# Patient Record
Sex: FEMALE | URBAN - METROPOLITAN AREA
[De-identification: names, ages, dates, MRNs, and addresses within clinical notes are randomized per-mention and may not be internally consistent; named-entity substitution may affect disease eponyms.]

---

## 2023-03-20 ENCOUNTER — TELEPHONE (OUTPATIENT)
Dept: MAMMOGRAPHY | Facility: HOSPITAL | Age: 53
End: 2023-03-20

## 2023-03-20 NOTE — TELEPHONE ENCOUNTER
Outside breast imaging has been received and reviewed by Dr Catherine Garcia. Left breast repeat ultrasound is recommended here prior to possible left breast 1 site ultrasound guided biopsy. This Breast Care RN phoned pt re left breast 1 site ultrasound guided biopsy recommendation by outside images for mass. Procedure reviewed and all questions answered. Emotional support given. Reviewed educational handouts. On the day of the biopsy, pt instructed to take Tylenol 1000mg PO, eat a light meal & bring or wear a sports bra. Post biopsy care also reviewed with pt to include NO lifting more than 5lbs, no exercising or housework (limit upper body movement) for 24-48 hrs post biopsy. Patient denies blood thinners, bleeding disorders, liver disease, and chemo. Pt verbalized understanding. Our breast center schedulers will be calling to schedule an appt that is convenient for pt.

## 2023-03-24 ENCOUNTER — HOSPITAL ENCOUNTER (OUTPATIENT)
Dept: MAMMOGRAPHY | Facility: HOSPITAL | Age: 53
Discharge: HOME | End: 2023-03-24
Attending: INTERNAL MEDICINE
Payer: COMMERCIAL

## 2023-03-24 DIAGNOSIS — N63.0 BREAST MASS: ICD-10-CM

## 2023-03-24 DIAGNOSIS — R92.8 ABNORMAL MAMMOGRAM: ICD-10-CM

## 2023-03-24 PROCEDURE — 77065 DX MAMMO INCL CAD UNI: CPT | Performed by: INTERNAL MEDICINE

## 2023-03-24 PROCEDURE — 88360 TUMOR IMMUNOHISTOCHEM/MANUAL: CPT | Performed by: INTERNAL MEDICINE

## 2023-03-24 PROCEDURE — 19083 BX BREAST 1ST LESION US IMAG: CPT | Performed by: INTERNAL MEDICINE

## 2023-03-24 PROCEDURE — 76642 ULTRASOUND BREAST LIMITED: CPT | Performed by: INTERNAL MEDICINE

## 2023-03-24 PROCEDURE — 88305 TISSUE EXAM BY PATHOLOGIST: CPT | Performed by: INTERNAL MEDICINE

## 2023-03-24 PROCEDURE — 88344 IMHCHEM/IMCYTCHM EA MLT ANTB: CPT | Performed by: INTERNAL MEDICINE

## 2023-03-28 ENCOUNTER — APPOINTMENT (OUTPATIENT)
Dept: GENETICS | Facility: HOSPITAL | Age: 53
End: 2023-03-28
Attending: INTERNAL MEDICINE
Payer: COMMERCIAL

## 2023-03-28 ENCOUNTER — NURSE NAVIGATOR ENCOUNTER (OUTPATIENT)
Dept: HEMATOLOGY/ONCOLOGY | Facility: HOSPITAL | Age: 53
End: 2023-03-28

## 2023-03-28 ENCOUNTER — TELEPHONE (OUTPATIENT)
Dept: GENERAL RADIOLOGY | Facility: HOSPITAL | Age: 53
End: 2023-03-28

## 2023-03-28 NOTE — PROGRESS NOTES
Received phone call from patient's  looking to schedule with Dr. Ham Session for a new diagnosis of breast cancer. Scheduled at the earliest appointment, 4/11. Also scheduled with medical oncology for 4/4 with Dr. Rosi Sanchez and genetics on 4/11, as her biopsy results indicate a triple negative breast cancer. Reviewed over pathology report and discussed the type of breast cancer, grade, and ER/VA and Her 2. Briefly discussed breast cancer treatments including surgery, radiation, hormone therapy, and chemotherapy. Explained the breast cancer multidisciplinary meeting and that her case will be discussed. Patient given my contact information to call with any further questions or concerns.

## 2023-04-04 ENCOUNTER — NURSE NAVIGATOR ENCOUNTER (OUTPATIENT)
Dept: HEMATOLOGY/ONCOLOGY | Facility: HOSPITAL | Age: 53
End: 2023-04-04

## 2023-04-04 ENCOUNTER — OFFICE VISIT (OUTPATIENT)
Dept: HEMATOLOGY/ONCOLOGY | Facility: HOSPITAL | Age: 53
End: 2023-04-04
Attending: INTERNAL MEDICINE
Payer: COMMERCIAL

## 2023-04-04 VITALS
BODY MASS INDEX: 28.51 KG/M2 | OXYGEN SATURATION: 100 % | HEART RATE: 88 BPM | SYSTOLIC BLOOD PRESSURE: 150 MMHG | TEMPERATURE: 98 F | DIASTOLIC BLOOD PRESSURE: 81 MMHG | HEIGHT: 67 IN | WEIGHT: 181.63 LBS | RESPIRATION RATE: 18 BRPM

## 2023-04-04 DIAGNOSIS — C50.812 MALIGNANT NEOPLASM OF OVERLAPPING SITES OF LEFT BREAST IN FEMALE, ESTROGEN RECEPTOR NEGATIVE (HCC): Primary | ICD-10-CM

## 2023-04-04 DIAGNOSIS — Z17.1 MALIGNANT NEOPLASM OF OVERLAPPING SITES OF LEFT BREAST IN FEMALE, ESTROGEN RECEPTOR NEGATIVE (HCC): Primary | ICD-10-CM

## 2023-04-04 PROCEDURE — 99245 OFF/OP CONSLTJ NEW/EST HI 55: CPT | Performed by: INTERNAL MEDICINE

## 2023-04-04 RX ORDER — ATENOLOL 50 MG/1
50 TABLET ORAL DAILY
COMMUNITY
Start: 2023-01-02

## 2023-04-04 RX ORDER — PNV NO.95/FERROUS FUM/FOLIC AC 28MG-0.8MG
TABLET ORAL DAILY
COMMUNITY

## 2023-04-04 RX ORDER — ATORVASTATIN CALCIUM 20 MG/1
20 TABLET, FILM COATED ORAL DAILY
COMMUNITY
Start: 2022-10-14

## 2023-04-04 NOTE — PROGRESS NOTES
Called patient for screening questions for MRI, scheduled for Thursday, 4/6. Patient aware of the appointment, date/time/location. Will call back with any other questions.

## 2023-04-04 NOTE — PROGRESS NOTES
Patient is here for MD consult for Breast cancer. Patient had a mammogram in November @ [address]. [place]. Upon her return, she had a repeat mammogram, ultrasound and biopsy. She is scheduled to see surgeon and genetics next week.        Education Record    Learner:  Patient and Spouse    Disease / Diagnosis:  Consult     Barriers / Limitations:  None   Comments:    Method:  Discussion   Comments:    General Topics:  Plan of care reviewed   Comments:    Outcome:  Shows understanding   Comments:

## 2023-04-06 ENCOUNTER — HOSPITAL ENCOUNTER (OUTPATIENT)
Dept: MRI IMAGING | Facility: HOSPITAL | Age: 53
Discharge: HOME | End: 2023-04-06
Attending: INTERNAL MEDICINE
Payer: COMMERCIAL

## 2023-04-06 DIAGNOSIS — C50.812 MALIGNANT NEOPLASM OF OVERLAPPING SITES OF LEFT BREAST IN FEMALE, ESTROGEN RECEPTOR NEGATIVE (HCC): ICD-10-CM

## 2023-04-06 DIAGNOSIS — Z17.1 MALIGNANT NEOPLASM OF OVERLAPPING SITES OF LEFT BREAST IN FEMALE, ESTROGEN RECEPTOR NEGATIVE (HCC): ICD-10-CM

## 2023-04-06 PROCEDURE — 77049 MRI BREAST C-+ W/CAD BI: CPT | Performed by: INTERNAL MEDICINE

## 2023-04-06 PROCEDURE — A9575 INJ GADOTERATE MEGLUMI 0.1ML: HCPCS | Performed by: INTERNAL MEDICINE

## 2023-04-06 RX ORDER — GADOTERATE MEGLUMINE 376.9 MG/ML
20 INJECTION INTRAVENOUS
Status: COMPLETED | OUTPATIENT
Start: 2023-04-06 | End: 2023-04-06

## 2023-04-06 RX ADMIN — GADOTERATE MEGLUMINE 16 ML: 376.9 INJECTION INTRAVENOUS at 09:27:00

## 2023-04-07 ENCOUNTER — TELEPHONE (OUTPATIENT)
Dept: GENERAL RADIOLOGY | Facility: HOSPITAL | Age: 53
End: 2023-04-07

## 2023-04-07 ENCOUNTER — NURSE NAVIGATOR ENCOUNTER (OUTPATIENT)
Dept: HEMATOLOGY/ONCOLOGY | Facility: HOSPITAL | Age: 53
End: 2023-04-07

## 2023-04-07 DIAGNOSIS — C50.812 MALIGNANT NEOPLASM OF OVERLAPPING SITES OF LEFT BREAST IN FEMALE, ESTROGEN RECEPTOR NEGATIVE (HCC): Primary | ICD-10-CM

## 2023-04-07 DIAGNOSIS — Z17.1 MALIGNANT NEOPLASM OF OVERLAPPING SITES OF LEFT BREAST IN FEMALE, ESTROGEN RECEPTOR NEGATIVE (HCC): Primary | ICD-10-CM

## 2023-04-07 NOTE — PROGRESS NOTES
Spoke with patient about results of MRI and that Dr. Toussaint Mention is recommending an additional MRI biopsy of the right breast.  Discussed the importance of this and surgical planning. Patient agreeable. Entered order and let patient know that a  will call. Patient aware of appointment on Tuesday with Dr. Toussaint Mention. Will call back as needed.

## 2023-04-10 ENCOUNTER — TELEPHONE (OUTPATIENT)
Dept: MAMMOGRAPHY | Facility: HOSPITAL | Age: 53
End: 2023-04-10

## 2023-04-10 NOTE — TELEPHONE ENCOUNTER
This Breast Care RN assisted Dr. Diogo Sheets with recommendation for a right breast MRI biopsy. Procedure reviewed and all questions answered. Emotional and educational support given. On the day of the biopsy, pt instructed to take Tylenol 1000mg PO, eat a light meal & bring or wear a sports bra. Post biopsy care also reviewed with pt to include NO lifting more than 5lbs, no exercising or housework (limit upper body movement) for 24-48 hrs post biopsy. Pt denies allergies, blood thinners, bleeding disorders, liver disease, and chemo. Pt verbalized understanding. Our breast center schedulers will be calling to schedule an appt that is convenient for pt.

## 2023-04-11 ENCOUNTER — OFFICE VISIT (OUTPATIENT)
Dept: HEMATOLOGY/ONCOLOGY | Facility: HOSPITAL | Age: 53
End: 2023-04-11
Attending: INTERNAL MEDICINE
Payer: COMMERCIAL

## 2023-04-11 ENCOUNTER — GENETICS ENCOUNTER (OUTPATIENT)
Dept: GENETICS | Facility: HOSPITAL | Age: 53
End: 2023-04-11
Attending: INTERNAL MEDICINE
Payer: COMMERCIAL

## 2023-04-11 ENCOUNTER — NURSE NAVIGATOR ENCOUNTER (OUTPATIENT)
Dept: HEMATOLOGY/ONCOLOGY | Facility: HOSPITAL | Age: 53
End: 2023-04-11

## 2023-04-11 ENCOUNTER — OFFICE VISIT (OUTPATIENT)
Facility: LOCATION | Age: 53
End: 2023-04-11
Payer: COMMERCIAL

## 2023-04-11 VITALS
SYSTOLIC BLOOD PRESSURE: 138 MMHG | RESPIRATION RATE: 16 BRPM | TEMPERATURE: 97 F | OXYGEN SATURATION: 100 % | HEART RATE: 79 BPM | WEIGHT: 180 LBS | HEIGHT: 67.01 IN | BODY MASS INDEX: 28.25 KG/M2 | DIASTOLIC BLOOD PRESSURE: 88 MMHG

## 2023-04-11 DIAGNOSIS — C50.912 DUCTAL CARCINOMA IN SITU (DCIS) OF LEFT BREAST WITH MICROINVASIVE COMPONENT (HCC): Primary | ICD-10-CM

## 2023-04-11 DIAGNOSIS — C50.912 DUCTAL CARCINOMA IN SITU (DCIS) OF LEFT BREAST WITH MICROINVASIVE COMPONENT (HCC): ICD-10-CM

## 2023-04-11 PROCEDURE — 99244 OFF/OP CNSLTJ NEW/EST MOD 40: CPT | Performed by: SURGERY

## 2023-04-11 PROCEDURE — 99211 OFF/OP EST MAY X REQ PHY/QHP: CPT

## 2023-04-11 PROCEDURE — 96040 HC GENETIC COUNSELING EA 30 MIN: CPT | Performed by: GENETIC COUNSELOR, MS

## 2023-04-11 PROCEDURE — 36415 COLL VENOUS BLD VENIPUNCTURE: CPT

## 2023-04-11 NOTE — PROGRESS NOTES
Met with patient and  in clinic. Introduced myself as one the breast nurse navigators and explained the role of the breast nurse navigator and coordination of care. Explained the role of all of the physicians involved in her care including the surgeon, medical oncologist, radiation oncologist, and possibly plastic surgeon. Reviewed over the patients pathology report and discussed receptors including ER/IL/ and Her 2. Patient was given the breast cancer treatment handbook and reviewed over how to use this resource. Discussed the breast multidisciplinary conference and that her case will be discussed. Patient was given the contact information for the social workers at the [facility] and resources for support as requested. Breast cancer journey map provided to patient. Provided lumpectomy surgical sheet. Next step in care will be to have MRI biopsy on Friday and return on 4/25 for a surgical planning discussion. Pt was provided with breast nurse navigator contact information and was encouraged to phone with any other questions or concerns.

## 2023-04-11 NOTE — PROGRESS NOTES
Patient presents to clinic for breast consultation and assessment. Ultrasound-guided core biopsy of left breast performed on 3/24/23. 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 Patient and spouse here to discuss pathology results and future surgical intervention. Biopsy site . Denies any fever, chills, redness, or swelling. No bleeding or discharge at biopsy site. Appointment with genetic counselor following today's appointment. Medication and allergies reviewed and updated.

## 2023-04-14 ENCOUNTER — HOSPITAL ENCOUNTER (OUTPATIENT)
Dept: MAMMOGRAPHY | Facility: HOSPITAL | Age: 53
Discharge: HOME | End: 2023-04-14
Attending: SURGERY
Payer: COMMERCIAL

## 2023-04-14 ENCOUNTER — HOSPITAL ENCOUNTER (OUTPATIENT)
Dept: MRI IMAGING | Facility: HOSPITAL | Age: 53
Discharge: HOME | End: 2023-04-14
Attending: SURGERY
Payer: COMMERCIAL

## 2023-04-14 DIAGNOSIS — Z17.1 MALIGNANT NEOPLASM OF OVERLAPPING SITES OF LEFT BREAST IN FEMALE, ESTROGEN RECEPTOR NEGATIVE (HCC): ICD-10-CM

## 2023-04-14 DIAGNOSIS — C50.812 MALIGNANT NEOPLASM OF OVERLAPPING SITES OF LEFT BREAST IN FEMALE, ESTROGEN RECEPTOR NEGATIVE (HCC): ICD-10-CM

## 2023-04-14 PROCEDURE — 77065 DX MAMMO INCL CAD UNI: CPT | Performed by: SURGERY

## 2023-04-14 PROCEDURE — A9575 INJ GADOTERATE MEGLUMI 0.1ML: HCPCS | Performed by: SURGERY

## 2023-04-14 PROCEDURE — 88305 TISSUE EXAM BY PATHOLOGIST: CPT | Performed by: SURGERY

## 2023-04-14 PROCEDURE — 19085 BX BREAST 1ST LESION MR IMAG: CPT | Performed by: SURGERY

## 2023-04-14 RX ORDER — GADOTERATE MEGLUMINE 376.9 MG/ML
20 INJECTION INTRAVENOUS
Status: COMPLETED | OUTPATIENT
Start: 2023-04-14 | End: 2023-04-14

## 2023-04-14 RX ADMIN — GADOTERATE MEGLUMINE 18 ML: 376.9 INJECTION INTRAVENOUS at 08:00:00

## 2023-04-14 NOTE — IMAGING NOTE
Verified patient name,  and allergies. Procedure explained throughout and all questions answered. Consent and discharge paperwork signed by Elodia Fontanez. Pt placed prone in comfortable position on MRI table. Right breast positioned in compression. Time out performed. Assisted Dr. Garfield Strong  with MRI guided biopsy. Patient tolerated well. Pressure held on biopsy site for 10 min. Nobleeding noted with no hematoma. Patient transported to [facility] via [address] for post Bx mammogram. Report to [place].

## 2023-04-14 NOTE — IMAGING NOTE
Patient arrived in stable condition to [address] post MRI guided breast biopsy. Steri strips intact. Juice provided. Post biopsy mammogram images completed. Ice pack provided to patient. Patient has no further questions regarding discharge instructions. Patient discharged to home in stable condition.

## 2023-04-17 ENCOUNTER — TELEPHONE (OUTPATIENT)
Facility: LOCATION | Age: 53
End: 2023-04-17

## 2023-04-17 NOTE — TELEPHONE ENCOUNTER
Contacted patient via telephone regarding results of right breast biopsy. 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 Patient will keep appointment scheduled for next week for surgical planning.

## 2023-04-21 ENCOUNTER — GENETICS ENCOUNTER (OUTPATIENT)
Dept: HEMATOLOGY/ONCOLOGY | Facility: HOSPITAL | Age: 53
End: 2023-04-21

## 2023-04-21 NOTE — PROGRESS NOTES
Referring Provider:                    Anival Du MD    Reason for Referral:  Korina York had genetic testing performed on 04/11/23 because of a new diagnosis of triple negative breast cancer. Genetic Testing Result:  No known pathogenic variants were found in the following 19 genes: CAR, BARD1, BRCA1, BRCA2, BRIP1, CDH1, CHEK2, MLH1, MSH2 (including EPCAM rearrangement testing), MSH6, NF1, PALB2, PMS2, PTEN, RAD51C, RAD51D, STK11, and TP53. Please refer to the report from [facility] for additional testing information. These results were discussed with Ms. Rodriguez by phone on 04/20/23. Summary and Plan:  These results indicate that it is unlikely that Ms. Tonie Morton has a pathogenic variant in any of the genes listed above. The limitations of the testing include the chance that a pathogenic variant in a gene other than those included in this analysis might be the cause of cancer in Ms. Rodriguez or her relatives. I encouraged Ms. Rodriguez to contact me on an annual basis to see if there have been any updates in genetic testing that would apply to her or to inform me if there are any changes to the family history. In the meantime, Ms. Rodriguez and her relatives should speak with their physicians to discuss recommended medical management according to their personal and family history.     Cc:  Lonaivon York

## 2023-04-25 ENCOUNTER — OFFICE VISIT (OUTPATIENT)
Facility: LOCATION | Age: 53
End: 2023-04-25
Payer: COMMERCIAL

## 2023-04-25 ENCOUNTER — OFFICE VISIT (OUTPATIENT)
Dept: HEMATOLOGY/ONCOLOGY | Facility: HOSPITAL | Age: 53
End: 2023-04-25
Attending: INTERNAL MEDICINE
Payer: COMMERCIAL

## 2023-04-25 VITALS
BODY MASS INDEX: 28.17 KG/M2 | WEIGHT: 179.5 LBS | DIASTOLIC BLOOD PRESSURE: 81 MMHG | SYSTOLIC BLOOD PRESSURE: 138 MMHG | RESPIRATION RATE: 14 BRPM | HEIGHT: 67.01 IN | TEMPERATURE: 97 F | OXYGEN SATURATION: 100 % | HEART RATE: 80 BPM

## 2023-04-25 DIAGNOSIS — C50.912 DUCTAL CARCINOMA IN SITU (DCIS) OF LEFT BREAST WITH MICROINVASIVE COMPONENT (HCC): Primary | ICD-10-CM

## 2023-04-25 PROCEDURE — 99211 OFF/OP EST MAY X REQ PHY/QHP: CPT

## 2023-04-25 PROCEDURE — 99214 OFFICE O/P EST MOD 30 MIN: CPT | Performed by: SURGERY

## 2023-04-26 ENCOUNTER — TELEPHONE (OUTPATIENT)
Facility: LOCATION | Age: 53
End: 2023-04-26

## 2023-04-26 DIAGNOSIS — C50.912 MALIGNANT NEOPLASM OF LEFT FEMALE BREAST, UNSPECIFIED ESTROGEN RECEPTOR STATUS, UNSPECIFIED SITE OF BREAST (HCC): Primary | ICD-10-CM

## 2023-04-26 NOTE — TELEPHONE ENCOUNTER
----- Message from Hector Sandy MD sent at 4/25/2023  9:03 AM CDT -----  Can you please schedule her for x-ray localized left breast central lumpectomy, left sentinel lymph node biopsy, injection blue dye left side? We were looking at May 11. Me with assist  General, outpatient    Dx: Left breast cancer    Thanks! Mary Ann    5/11 NOT AVAILABLE , 5/5 DATE FOR SURGERY AND PATIENT INFORMED.

## 2023-04-27 ENCOUNTER — EKG ENCOUNTER (OUTPATIENT)
Dept: LAB | Age: 53
End: 2023-04-27
Payer: COMMERCIAL

## 2023-04-27 ENCOUNTER — LABORATORY ENCOUNTER (OUTPATIENT)
Dept: LAB | Age: 53
End: 2023-04-27
Payer: COMMERCIAL

## 2023-04-27 DIAGNOSIS — C50.912 MALIGNANT NEOPLASM OF LEFT FEMALE BREAST, UNSPECIFIED ESTROGEN RECEPTOR STATUS, UNSPECIFIED SITE OF BREAST (HCC): ICD-10-CM

## 2023-04-27 LAB
ANION GAP SERPL CALC-SCNC: 3 MMOL/L (ref 0–18)
BUN BLD-MCNC: 7 MG/DL (ref 7–18)
CALCIUM BLD-MCNC: 9.7 MG/DL (ref 8.5–10.1)
CHLORIDE SERPL-SCNC: 104 MMOL/L (ref 98–112)
CO2 SERPL-SCNC: 28 MMOL/L (ref 21–32)
CREAT BLD-MCNC: 0.74 MG/DL
FASTING STATUS PATIENT QL REPORTED: YES
GFR SERPLBLD BASED ON 1.73 SQ M-ARVRAT: 97 ML/MIN/1.73M2 (ref 60–?)
GLUCOSE BLD-MCNC: 146 MG/DL (ref 70–99)
OSMOLALITY SERPL CALC.SUM OF ELEC: 281 MOSM/KG (ref 275–295)
POTASSIUM SERPL-SCNC: 3.4 MMOL/L (ref 3.5–5.1)
SODIUM SERPL-SCNC: 135 MMOL/L (ref 136–145)

## 2023-04-27 PROCEDURE — 80048 BASIC METABOLIC PNL TOTAL CA: CPT

## 2023-04-27 PROCEDURE — 93010 ELECTROCARDIOGRAM REPORT: CPT | Performed by: INTERNAL MEDICINE

## 2023-04-27 PROCEDURE — 93005 ELECTROCARDIOGRAM TRACING: CPT

## 2023-04-27 PROCEDURE — 36415 COLL VENOUS BLD VENIPUNCTURE: CPT

## 2023-04-28 ENCOUNTER — TELEPHONE (OUTPATIENT)
Dept: GENERAL RADIOLOGY | Facility: HOSPITAL | Age: 53
End: 2023-04-28

## 2023-04-28 LAB
ATRIAL RATE: 78 BPM
P AXIS: 69 DEGREES
P-R INTERVAL: 138 MS
Q-T INTERVAL: 398 MS
QRS DURATION: 82 MS
QTC CALCULATION (BEZET): 453 MS
R AXIS: 82 DEGREES
T AXIS: 58 DEGREES
VENTRICULAR RATE: 78 BPM

## 2023-05-02 ENCOUNTER — TELEPHONE (OUTPATIENT)
Dept: MAMMOGRAPHY | Facility: HOSPITAL | Age: 53
End: 2023-05-02

## 2023-05-02 NOTE — TELEPHONE ENCOUNTER
Spoke with Joe Wagner [facility] regarding [place] Lymph Node mapping to be done in nuclear medicine department before lumpectomy surgery scheduled for 5-5-23 with Dr Ray Courser. Also reviewed wire localization to be done in [facility]. Both procedures explained and all questions answered. Pt to be transported to each department by [facility] through [place]. Will make every effort to ensure privacy and safety when transported between departments. Breast Care Coordinator to provide education and written information regarding lumpectomy surgery, breast cancer and lymphedema. Pt verbalized understanding and had no further questions at this time.

## 2023-05-03 ENCOUNTER — TELEPHONE (OUTPATIENT)
Facility: LOCATION | Age: 53
End: 2023-05-03

## 2023-05-05 ENCOUNTER — HOSPITAL ENCOUNTER (OUTPATIENT)
Dept: MAMMOGRAPHY | Facility: HOSPITAL | Age: 53
Discharge: HOME | End: 2023-05-05
Attending: SURGERY
Payer: COMMERCIAL

## 2023-05-05 ENCOUNTER — HOSPITAL ENCOUNTER (OUTPATIENT)
Facility: HOSPITAL | Age: 53
Setting detail: HOSPITAL OUTPATIENT SURGERY
Discharge: HOME | End: 2023-05-05
Attending: SURGERY | Admitting: SURGERY
Payer: COMMERCIAL

## 2023-05-05 ENCOUNTER — ANESTHESIA (OUTPATIENT)
Dept: SURGERY | Facility: HOSPITAL | Age: 53
End: 2023-05-05
Payer: COMMERCIAL

## 2023-05-05 ENCOUNTER — HOSPITAL ENCOUNTER (OUTPATIENT)
Dept: NUCLEAR MEDICINE | Facility: HOSPITAL | Age: 53
Discharge: HOME | End: 2023-05-05
Attending: SURGERY
Payer: COMMERCIAL

## 2023-05-05 ENCOUNTER — ANESTHESIA EVENT (OUTPATIENT)
Dept: SURGERY | Facility: HOSPITAL | Age: 53
End: 2023-05-05
Payer: COMMERCIAL

## 2023-05-05 VITALS
DIASTOLIC BLOOD PRESSURE: 87 MMHG | HEIGHT: 67 IN | TEMPERATURE: 97 F | SYSTOLIC BLOOD PRESSURE: 134 MMHG | RESPIRATION RATE: 14 BRPM | HEART RATE: 60 BPM | BODY MASS INDEX: 27.62 KG/M2 | OXYGEN SATURATION: 100 % | WEIGHT: 176 LBS

## 2023-05-05 DIAGNOSIS — C50.912 MALIGNANT NEOPLASM OF LEFT FEMALE BREAST, UNSPECIFIED ESTROGEN RECEPTOR STATUS, UNSPECIFIED SITE OF BREAST (HCC): ICD-10-CM

## 2023-05-05 DIAGNOSIS — C50.912 MALIGNANT NEOPLASM OF LEFT FEMALE BREAST, UNSPECIFIED ESTROGEN RECEPTOR STATUS, UNSPECIFIED SITE OF BREAST (HCC): Primary | ICD-10-CM

## 2023-05-05 LAB — B-HCG UR QL: NEGATIVE

## 2023-05-05 PROCEDURE — 88341 IMHCHEM/IMCYTCHM EA ADD ANTB: CPT | Performed by: SURGERY

## 2023-05-05 PROCEDURE — 0HBU0ZX EXCISION OF LEFT BREAST, OPEN APPROACH, DIAGNOSTIC: ICD-10-PCS | Performed by: SURGERY

## 2023-05-05 PROCEDURE — 07B60ZX EXCISION OF LEFT AXILLARY LYMPHATIC, OPEN APPROACH, DIAGNOSTIC: ICD-10-PCS | Performed by: SURGERY

## 2023-05-05 PROCEDURE — 88305 TISSUE EXAM BY PATHOLOGIST: CPT | Performed by: SURGERY

## 2023-05-05 PROCEDURE — 88377 M/PHMTRC ALYS ISHQUANT/SEMIQ: CPT | Performed by: SURGERY

## 2023-05-05 PROCEDURE — 88344 IMHCHEM/IMCYTCHM EA MLT ANTB: CPT | Performed by: SURGERY

## 2023-05-05 PROCEDURE — 88342 IMHCHEM/IMCYTCHM 1ST ANTB: CPT | Performed by: SURGERY

## 2023-05-05 PROCEDURE — 81025 URINE PREGNANCY TEST: CPT

## 2023-05-05 PROCEDURE — 88307 TISSUE EXAM BY PATHOLOGIST: CPT | Performed by: SURGERY

## 2023-05-05 PROCEDURE — 78195 LYMPH SYSTEM IMAGING: CPT | Performed by: SURGERY

## 2023-05-05 PROCEDURE — 19281 PERQ DEVICE BREAST 1ST IMAG: CPT | Performed by: SURGERY

## 2023-05-05 PROCEDURE — 88360 TUMOR IMMUNOHISTOCHEM/MANUAL: CPT | Performed by: SURGERY

## 2023-05-05 PROCEDURE — 76098 X-RAY EXAM SURGICAL SPECIMEN: CPT | Performed by: SURGERY

## 2023-05-05 RX ORDER — MIDAZOLAM HYDROCHLORIDE 1 MG/ML
1 INJECTION INTRAMUSCULAR; INTRAVENOUS EVERY 5 MIN PRN
Status: DISCONTINUED | OUTPATIENT
Start: 2023-05-05 | End: 2023-05-05

## 2023-05-05 RX ORDER — BUPIVACAINE HYDROCHLORIDE AND EPINEPHRINE 5; 5 MG/ML; UG/ML
INJECTION, SOLUTION EPIDURAL; INTRACAUDAL; PERINEURAL AS NEEDED
Status: DISCONTINUED | OUTPATIENT
Start: 2023-05-05 | End: 2023-05-05 | Stop reason: HOSPADM

## 2023-05-05 RX ORDER — DIAZEPAM 5 MG/1
5 TABLET ORAL AS NEEDED
Status: DISCONTINUED | OUTPATIENT
Start: 2023-05-05 | End: 2023-05-05 | Stop reason: HOSPADM

## 2023-05-05 RX ORDER — SCOLOPAMINE TRANSDERMAL SYSTEM 1 MG/1
1 PATCH, EXTENDED RELEASE TRANSDERMAL ONCE
Status: DISCONTINUED | OUTPATIENT
Start: 2023-05-05 | End: 2023-05-05

## 2023-05-05 RX ORDER — SODIUM CHLORIDE, SODIUM LACTATE, POTASSIUM CHLORIDE, CALCIUM CHLORIDE 600; 310; 30; 20 MG/100ML; MG/100ML; MG/100ML; MG/100ML
INJECTION, SOLUTION INTRAVENOUS CONTINUOUS
Status: DISCONTINUED | OUTPATIENT
Start: 2023-05-05 | End: 2023-05-05

## 2023-05-05 RX ORDER — HYDROCODONE BITARTRATE AND ACETAMINOPHEN 5; 325 MG/1; MG/1
1 TABLET ORAL ONCE AS NEEDED
Status: COMPLETED | OUTPATIENT
Start: 2023-05-05 | End: 2023-05-05

## 2023-05-05 RX ORDER — HYDROMORPHONE HYDROCHLORIDE 1 MG/ML
0.4 INJECTION, SOLUTION INTRAMUSCULAR; INTRAVENOUS; SUBCUTANEOUS EVERY 5 MIN PRN
Status: DISCONTINUED | OUTPATIENT
Start: 2023-05-05 | End: 2023-05-05

## 2023-05-05 RX ORDER — DEXAMETHASONE SODIUM PHOSPHATE 4 MG/ML
VIAL (ML) INJECTION AS NEEDED
Status: DISCONTINUED | OUTPATIENT
Start: 2023-05-05 | End: 2023-05-05 | Stop reason: SURG

## 2023-05-05 RX ORDER — NALOXONE HYDROCHLORIDE 0.4 MG/ML
80 INJECTION, SOLUTION INTRAMUSCULAR; INTRAVENOUS; SUBCUTANEOUS AS NEEDED
Status: DISCONTINUED | OUTPATIENT
Start: 2023-05-05 | End: 2023-05-05

## 2023-05-05 RX ORDER — PROCHLORPERAZINE EDISYLATE 5 MG/ML
5 INJECTION INTRAMUSCULAR; INTRAVENOUS EVERY 8 HOURS PRN
Status: DISCONTINUED | OUTPATIENT
Start: 2023-05-05 | End: 2023-05-05

## 2023-05-05 RX ORDER — HYDROMORPHONE HYDROCHLORIDE 1 MG/ML
0.2 INJECTION, SOLUTION INTRAMUSCULAR; INTRAVENOUS; SUBCUTANEOUS EVERY 5 MIN PRN
Status: DISCONTINUED | OUTPATIENT
Start: 2023-05-05 | End: 2023-05-05

## 2023-05-05 RX ORDER — MIDAZOLAM HYDROCHLORIDE 1 MG/ML
INJECTION INTRAMUSCULAR; INTRAVENOUS AS NEEDED
Status: DISCONTINUED | OUTPATIENT
Start: 2023-05-05 | End: 2023-05-05 | Stop reason: SURG

## 2023-05-05 RX ORDER — CEFAZOLIN SODIUM/WATER 2 G/20 ML
2 SYRINGE (ML) INTRAVENOUS ONCE
Status: COMPLETED | OUTPATIENT
Start: 2023-05-05 | End: 2023-05-05

## 2023-05-05 RX ORDER — LIDOCAINE AND PRILOCAINE 25; 25 MG/G; MG/G
CREAM TOPICAL ONCE
Status: COMPLETED | OUTPATIENT
Start: 2023-05-05 | End: 2023-05-05

## 2023-05-05 RX ORDER — HYDROMORPHONE HYDROCHLORIDE 1 MG/ML
0.6 INJECTION, SOLUTION INTRAMUSCULAR; INTRAVENOUS; SUBCUTANEOUS EVERY 5 MIN PRN
Status: DISCONTINUED | OUTPATIENT
Start: 2023-05-05 | End: 2023-05-05

## 2023-05-05 RX ORDER — METOPROLOL TARTRATE 5 MG/5ML
2.5 INJECTION INTRAVENOUS ONCE
Status: DISCONTINUED | OUTPATIENT
Start: 2023-05-05 | End: 2023-05-05

## 2023-05-05 RX ORDER — ONDANSETRON 2 MG/ML
INJECTION INTRAMUSCULAR; INTRAVENOUS AS NEEDED
Status: DISCONTINUED | OUTPATIENT
Start: 2023-05-05 | End: 2023-05-05 | Stop reason: SURG

## 2023-05-05 RX ORDER — METOCLOPRAMIDE HYDROCHLORIDE 5 MG/ML
INJECTION INTRAMUSCULAR; INTRAVENOUS AS NEEDED
Status: DISCONTINUED | OUTPATIENT
Start: 2023-05-05 | End: 2023-05-05 | Stop reason: SURG

## 2023-05-05 RX ORDER — ONDANSETRON 2 MG/ML
4 INJECTION INTRAMUSCULAR; INTRAVENOUS EVERY 6 HOURS PRN
Status: DISCONTINUED | OUTPATIENT
Start: 2023-05-05 | End: 2023-05-05

## 2023-05-05 RX ORDER — HYDROCODONE BITARTRATE AND ACETAMINOPHEN 5; 325 MG/1; MG/1
1 TABLET ORAL EVERY 6 HOURS PRN
Qty: 20 TABLET | Refills: 0 | Status: SHIPPED | OUTPATIENT
Start: 2023-05-05

## 2023-05-05 RX ORDER — KETOROLAC TROMETHAMINE 30 MG/ML
INJECTION, SOLUTION INTRAMUSCULAR; INTRAVENOUS AS NEEDED
Status: DISCONTINUED | OUTPATIENT
Start: 2023-05-05 | End: 2023-05-05 | Stop reason: SURG

## 2023-05-05 RX ORDER — ACETAMINOPHEN 500 MG
1000 TABLET ORAL ONCE AS NEEDED
Status: COMPLETED | OUTPATIENT
Start: 2023-05-05 | End: 2023-05-05

## 2023-05-05 RX ORDER — HYDROCODONE BITARTRATE AND ACETAMINOPHEN 5; 325 MG/1; MG/1
2 TABLET ORAL ONCE AS NEEDED
Status: COMPLETED | OUTPATIENT
Start: 2023-05-05 | End: 2023-05-05

## 2023-05-05 RX ORDER — MEPERIDINE HYDROCHLORIDE 25 MG/ML
12.5 INJECTION INTRAMUSCULAR; INTRAVENOUS; SUBCUTANEOUS AS NEEDED
Status: DISCONTINUED | OUTPATIENT
Start: 2023-05-05 | End: 2023-05-05

## 2023-05-05 RX ORDER — ACETAMINOPHEN 500 MG
1000 TABLET ORAL ONCE
Status: DISCONTINUED | OUTPATIENT
Start: 2023-05-05 | End: 2023-05-05 | Stop reason: HOSPADM

## 2023-05-05 RX ADMIN — SODIUM CHLORIDE, SODIUM LACTATE, POTASSIUM CHLORIDE, CALCIUM CHLORIDE: 600; 310; 30; 20 INJECTION, SOLUTION INTRAVENOUS at 13:29:00

## 2023-05-05 RX ADMIN — SODIUM CHLORIDE, SODIUM LACTATE, POTASSIUM CHLORIDE, CALCIUM CHLORIDE: 600; 310; 30; 20 INJECTION, SOLUTION INTRAVENOUS at 11:51:00

## 2023-05-05 RX ADMIN — DEXAMETHASONE SODIUM PHOSPHATE 8 MG: 4 MG/ML VIAL (ML) INJECTION at 12:08:00

## 2023-05-05 RX ADMIN — METOCLOPRAMIDE HYDROCHLORIDE 10 MG: 5 INJECTION INTRAMUSCULAR; INTRAVENOUS at 12:08:00

## 2023-05-05 RX ADMIN — KETOROLAC TROMETHAMINE 30 MG: 30 INJECTION, SOLUTION INTRAMUSCULAR; INTRAVENOUS at 13:05:00

## 2023-05-05 RX ADMIN — ONDANSETRON 4 MG: 2 INJECTION INTRAMUSCULAR; INTRAVENOUS at 13:05:00

## 2023-05-05 RX ADMIN — MIDAZOLAM HYDROCHLORIDE 2 MG: 1 INJECTION INTRAMUSCULAR; INTRAVENOUS at 11:58:00

## 2023-05-05 RX ADMIN — CEFAZOLIN SODIUM/WATER 2 G: 2 G/20 ML SYRINGE (ML) INTRAVENOUS at 12:08:00

## 2023-05-05 NOTE — DISCHARGE INSTRUCTIONS
Home Care Instructions for Breast Surgery  Dr. Shruti Zaldivar  For post-operative pain control the mediations are usually over the counter preparations such as Advil and Tylenol. For severe pain the patient may take the prescribed Norco or Tylenol #3, which are narcotic pain medications. The patient may also overlap Advil with Tylenol, Tylenol #3 or Norco.  The patient can do this by taking two Tylenol, then three hours later taking two Advil, then three hours later taking Tylenol again. All home medications may be resumed as scheduled. Generally, aspirin is avoided for ten days. DIET  The patient may resume a general diet immediately. There should be no alcohol consumption in the immediate recover time period. If the patient was sedated for the procedure the first meal should be light. WOUND CARE  The top dressings may be removed the day after surgery. This includes the gauze, tape and band-aids if they are present. Do not remove the steri-strips or butterfly tapes that are white and adherent to the skin. The steri-strips will eventually peel up at the ends and at this point they may be removed. This is usually seven to ten days after surgery. The patient may shower the day after surgery. There is no need to cover the incisions and all top gauze type dressings should be removed prior to showering. Soap can get on the wounds but do not scrub over the wounds. No hair dye or chemicals of any kind should get in the wounds. Most wounds will be closed with dissolving suture underneath the skin. These sutures will dissolve on their own. ACTIVITY  The patient may ride in a car but should not drive the car for at least overnight if sedation was used. If no sedation was used the patient may drive immediately. The patient may return to work the next day. Avoid any activity that could lead to the breast getting elbowed or bumped.   Avoid bending, pushing, pulling and lifting anything heavier than 25 pounds for two weeks. No jogging or workouts for two weeks. Fast walking and using a treadmill at less than 3.5 miles per hour in the flat position is acceptable. Patients should seek further activity limits at the time of their appointment. Patients should wear a supportive bra, even at night, for two weeks. The best support is with a sports bra. No golfing, tennis, racquetball, volleyball, or extreme sports for two weeks. APPOINTMENT  Tori Herrera or Vidal Damon will call you to set up an appointment in 5-7 days after surgery. This is when the pathology results should be available. If the wound turns red, hot, swollen, becomes increasingly painful, or drains pus call us immediately at [phone]. Bleeding requiring a return to the operating room happens two to three percent of the time. Minor bleeding from the incision is expected. A significant bruise can also be expected. Severe swelling of the breast with pain, bluish discoloration, or the passage of blood clots through the incision is an indication for bleeding. The number listed above is our office number. Our phone automatically switches to our answering service if we are not there. For non-emergent care please call our office at 8:30 a.m. Monday through Friday. For emergencies please call us at any time. Thank you for entrusting us with your care.   [facility]--General Surgery       You received a drug called Toradol which is an Anti Inflammatory at: 1PM  If you are allowed to take Anti inflammatories (like Motrin, Aleve or Ibuprofen, Advil), do not take any  until after: 7PM  Please report any suspected allergic reactions or bleeding issues to your doctor     You have been given a prescription for Joe Kettle was given to you at: 2:45PM  Next dose no sooner than:  6:45PM  Take this medication as directed  This medication contains Tylenol (acetaminophen)  Do not take additional Tylenol while taking Renetta Raj is a Narcotic and can be constipating or upset your stomach  Don't take Norco on an empty stomach  Drink plenty of water  Alcoholic beverages should be avoided while taking narcotics

## 2023-05-05 NOTE — IMAGING NOTE
Assisted  with mammography guided needle localization of the left breast.   Eduard Hendricks identified with spelling of name and date of birth. Medications and allergies reviewed. NKDA reported. History: left breast--ductal carcinoma in situ with multiple foci of microinvasion  Surgery: LEFT BREAST CENTRAL LUMPECTOMY WITH X-RAY LOCALIZATION, SENTINEL LYMPH NODE BIOPSY, INJECTION BLUE DYE    Order verified. Procedure explained and questions answered. Lexie Rodriguez verbalized understanding and agreement. 1010: Written consent obtained. 1024: Scans taken by Wilian Metcalf- mammography technologist    [phone]: Site prepped in a sterile manner. 1027: Dr. Jhonatan Carbajal present    [phone]: Time out complete. 1030 Lidocaine administered for anesthetic affect. 1031: Woodward 20G x 5cm needle placed- left breast  1032 Lidocaine administered for anesthetic affect. 1033: Woodward 20G x 5cm needle placed- left breast    Emotional support provided. Fabienne Rodriguez tolerated procedure well. Site cleaned. Wires secured with sterile 4x4 gauze dressing, Transpore tape, and a styrofoam cup. Eduard Hendricks transported via wheelchair to pre-op/surgery holding in stable condition. Ms. Franklyn Reyes without complaints at this time.

## 2023-05-05 NOTE — INTERVAL H&P NOTE
Pre-op Diagnosis: Malignant neoplasm of left female breast, unspecified estrogen receptor status, unspecified site of breast ([facility] [[email or web address]]ca [facility].) [C50.912]    The above referenced H&P was reviewed by Cecilia Head MD on 5/5/2023, the patient was examined and no significant changes have occurred in the patient's condition since the H&P was performed. I discussed with the patient and/or legal representative the potential benefits, risks and side effects of this procedure; the likelihood of the patient achieving goals; and potential problems that might occur during recuperation. I discussed reasonable alternatives to the procedure, including risks, benefits and side effects related to the alternatives and risks related to not receiving this procedure. We will proceed with procedure as planned.

## 2023-05-05 NOTE — ANESTHESIA PROCEDURE NOTES
Airway  Date/Time: 5/5/2023 11:59 AM  Urgency: elective      General Information and Staff    Patient location during procedure: OR  Anesthesiologist: Alondra Lorenz MD  Performed: anesthesiologist   Performed by: Alondra Lorenz MD  Authorized by: Alondra Lorenz MD      Indications and Patient Condition  Indications for airway management: anesthesia  Sedation level: deep  Preoxygenated: yes  Patient position: sniffing  Mask difficulty assessment: 1 - vent by mask    Final Airway Details  Final airway type: supraglottic airway      Successful airway: classic  Size 3       Number of attempts at approach: 1

## 2023-05-05 NOTE — OPERATIVE REPORT
[facility]  Op Note    Padmini Kumar [facility] Location: OR   CSN [id number] MRN [id number]   Admission Date 5/5/2023 Operation Date 5/5/2023   Attending Physician Acacia Pierce MD Operating Physician Shalom Garrett MD     DATE OF OPERATION:  5/5/2023  PREOPERATIVE DIAGNOSIS: Left breast DCIS with microinvasion  POSTOPERATIVE DIAGNOSIS: Left breast DCIS with microinvasion   PROCEDURE PERFORMED: X-ray localized left breast lumpectomy with attention to surgical margins, injection of blue dye (left breast), left sentinel lymph node biopsy. ANESTHESIA: Gen   SURGEON:  Shy Henderson M.D.  ASSISTANT: Ryann White PA-C   SPECIMEN:  1. [place] lymph node #1  2. [place] lymph node #2  3. Left breast central lumpectomy  4. Deep margin, left breast  5. Medial margin, left breast  6. Lateral margin, left breast  7. Superior margin, left breast  8. Inferior margin, left breast  ESTIMATED BLOOD LOSS: 30 mL. COMPLICATIONS: None. INDICATIONS: The patient is a 51-year-old female who was found to have extensive DCIS with microinvasion that extended up to the posterior nipple areolar complex. She was offered x-ray localized lumpectomy and sentinel lymph node biopsy. The risks, benefits, alternatives were discussed in detail with the patient. Risks included but not limited to, seroma development, infection and bleeding as well as neurovascular injury under the arm, future lymphedema, and the possibility of having to return to the operating room if the margins are positive or there is extranodal extension on any positive lymph nodes. We also discussed removal of the nipple areolar complex. She was agreeable to proceed with the operation. PROCEDURE: After informed consent was obtained, the patient was taken to the radiology suite where two wires were placed to localize her breast lesion.  She also had radionucleotide injected into her breast. She was then brought up to the operating room where general anesthesia was induced. 6 cc of 50% methylene blue was injected into the left breast. The breast was then massaged for 5 minutes. The patient's breast and axilla were prepped and draped in the usual sterile fashion. The area of greatest radioactivity was identified in the axilla. An incision was made in this area with a 15 blade scalpel. Cautery was used to obtain hemostasis and to continue the dissection down through the clavipectoral fascia. At this point, blunt dissection was used to identify the first sentinel lymph node. This was grasped with an Allis. It was bluntly dissected free from the surrounding tissue. It was ultimately excised using cautery. An ex vivo count was taken, and the lymph node was sent off the field. Interrogation of the axilla revealed another hot spot. Blunt dissection was used to identify the second sentinel lymph node. This tissue was grasped with an Allis. The lymph node was first bluntly dissected free. It was ultimately excised using cautery. An ex vivo count was taken. The axilla was then interrogated, and no further hot spots were noted. The background count was found to be much less than 10% of the hottest lymph node. The axilla was palpated, and no enlarged lymph nodes were appreciated. The wound was then irrigated with normal saline. The incision was closed in 2 layers, using a 3-0 Vicryl on the deep layer and a 4-0 Vicryl on the skin. Attention was then turned to the breast. The tissue was locally anesthetized with 0.5% Marcaine with epinephrine. An elliptical incision was made to encompass the palpable mass as well as the nipple areolar complex. Cautery was used to obtain hemostasis. The tissue that encircled the wires was then grasped. Metzenbaums and cautery were used to sharply dissect this tissue free. The specimen was marked long stitch lateral, short stitch superior. Faxitron image revealed the clips within the tissue.   The tissue at each of the 5 margins (excluding the anterior margin) was grasped with an Allis and excised using Metzenbaums. A suture was placed toward the lumpectomy cavity on each of the 6 specimen. Cautery was then used to obtain hemostasis. The wound was irrigated with normal saline. The incision was then closed in 2 layers, using a 3-0 Vicryl in the deep layer and a 4-0 Vicryl in the subcuticular skin. Each of the incisions was infiltrated with local anesthetic. Steri-Strips were placed across both incisions as well as sterile dressings. The patient tolerated the procedure well, was extubated in the OR, and transferred to the PACU in good condition. Ana Luisa Ruiz MD    [place] Node Biopsy for Breast Cancer - Left  Operation performed with curative intent. Yes   Tracer(s) used to identify sentinel nodes in the upfront surgery (non-neoadjuvant) setting (select all that apply). Dye and Radioactive tracer   Tracer(s) used to identify sentinel nodes in the neoadjuvant setting (select all that apply). N/A   All nodes (colored or non-colored) present at the end of a dye-filled lymphatic channel were removed. Yes   All significantly radioactive nodes were removed. Yes   All palpably suspicious nodes were removed. Yes   Biopsy-proven positive nodes marked with clips prior to chemotherapy were identified and removed.  N/A

## 2023-05-08 ENCOUNTER — NURSE NAVIGATOR ENCOUNTER (OUTPATIENT)
Dept: HEMATOLOGY/ONCOLOGY | Facility: HOSPITAL | Age: 53
End: 2023-05-08

## 2023-05-08 NOTE — PROGRESS NOTES
Left message for patient to call back, need to schedule post-op appointment and consult with medical oncologist.

## 2023-05-08 NOTE — PROGRESS NOTES
Patient returned phone call, post op day #3. States that overall doing well, pain is well controlled. She has looked at incisions and no signs of infection. Scheduled her surgical follow up appointment. Scheduled with medical oncology follow up. Phone number provided and encouraged patient to call back with any questions or concerns.

## 2023-05-16 ENCOUNTER — OFFICE VISIT (OUTPATIENT)
Dept: HEMATOLOGY/ONCOLOGY | Facility: HOSPITAL | Age: 53
End: 2023-05-16
Attending: INTERNAL MEDICINE
Payer: COMMERCIAL

## 2023-05-16 ENCOUNTER — OFFICE VISIT (OUTPATIENT)
Facility: LOCATION | Age: 53
End: 2023-05-16
Payer: COMMERCIAL

## 2023-05-16 VITALS
DIASTOLIC BLOOD PRESSURE: 83 MMHG | TEMPERATURE: 98 F | BODY MASS INDEX: 28.51 KG/M2 | OXYGEN SATURATION: 100 % | HEIGHT: 67.01 IN | WEIGHT: 181.63 LBS | SYSTOLIC BLOOD PRESSURE: 144 MMHG | HEART RATE: 97 BPM | RESPIRATION RATE: 20 BRPM

## 2023-05-16 DIAGNOSIS — Z98.890 S/P LUMPECTOMY, LEFT BREAST: ICD-10-CM

## 2023-05-16 DIAGNOSIS — C50.112 MALIGNANT NEOPLASM OF CENTRAL PORTION OF LEFT BREAST IN FEMALE, ESTROGEN RECEPTOR NEGATIVE (HCC): Primary | ICD-10-CM

## 2023-05-16 DIAGNOSIS — Z17.1 MALIGNANT NEOPLASM OF OVERLAPPING SITES OF LEFT BREAST IN FEMALE, ESTROGEN RECEPTOR NEGATIVE (HCC): Primary | ICD-10-CM

## 2023-05-16 DIAGNOSIS — C50.812 MALIGNANT NEOPLASM OF OVERLAPPING SITES OF LEFT BREAST IN FEMALE, ESTROGEN RECEPTOR NEGATIVE (HCC): Primary | ICD-10-CM

## 2023-05-16 DIAGNOSIS — D05.12 DUCTAL CARCINOMA IN SITU (DCIS) OF LEFT BREAST: ICD-10-CM

## 2023-05-16 DIAGNOSIS — Z17.1 MALIGNANT NEOPLASM OF CENTRAL PORTION OF LEFT BREAST IN FEMALE, ESTROGEN RECEPTOR NEGATIVE (HCC): Primary | ICD-10-CM

## 2023-05-16 PROCEDURE — 99211 OFF/OP EST MAY X REQ PHY/QHP: CPT

## 2023-05-16 PROCEDURE — 99215 OFFICE O/P EST HI 40 MIN: CPT | Performed by: INTERNAL MEDICINE

## 2023-05-16 PROCEDURE — 99024 POSTOP FOLLOW-UP VISIT: CPT | Performed by: SURGERY

## 2023-05-16 NOTE — PROGRESS NOTES
Patient is here for MD f/u post surgery. Patient had left lumpectomy with SLN on 5/5. Healing well. Here to discuss tx options.        Education Record    Learner:  Patient    Disease / Diagnosis:  Breast cancer     Barriers / Limitations:  None   Comments:    Method:  Discussion   Comments:    General Topics:  Plan of care reviewed   Comments:    Outcome:  Shows understanding   Comments:

## 2023-05-18 ENCOUNTER — PATIENT OUTREACH (OUTPATIENT)
Facility: LOCATION | Age: 53
End: 2023-05-18

## 2023-05-19 ENCOUNTER — TELEPHONE (OUTPATIENT)
Dept: HEMATOLOGY/ONCOLOGY | Facility: HOSPITAL | Age: 53
End: 2023-05-19

## 2023-05-19 ENCOUNTER — NURSE NAVIGATOR ENCOUNTER (OUTPATIENT)
Dept: HEMATOLOGY/ONCOLOGY | Facility: HOSPITAL | Age: 53
End: 2023-05-19

## 2023-05-19 DIAGNOSIS — Z79.899 ENCOUNTER FOR MONITORING CARDIOTOXIC DRUG THERAPY: ICD-10-CM

## 2023-05-19 DIAGNOSIS — Z17.1 MALIGNANT NEOPLASM OF OVERLAPPING SITES OF LEFT BREAST IN FEMALE, ESTROGEN RECEPTOR NEGATIVE (HCC): Primary | ICD-10-CM

## 2023-05-19 DIAGNOSIS — Z51.81 ENCOUNTER FOR MONITORING CARDIOTOXIC DRUG THERAPY: ICD-10-CM

## 2023-05-19 DIAGNOSIS — C50.812 MALIGNANT NEOPLASM OF OVERLAPPING SITES OF LEFT BREAST IN FEMALE, ESTROGEN RECEPTOR NEGATIVE (HCC): Primary | ICD-10-CM

## 2023-05-19 NOTE — TELEPHONE ENCOUNTER
Spoke to patient's  Frutoso Flaming and discussed final path results. HER2 FISH is amplified. Explained again than given HER2 positive, hormone receptor negative disease and > 5 mm tumor, adjuvant systemic therapy with TH is recommended; paclitaxel (80 mg/m2) weekly for 12 weeks plus trastuzumab for one year. He expressed understanding and stated that Moris Erika is agreeable to proceed with the treatment.

## 2023-05-19 NOTE — PROGRESS NOTES
Spoke to patient and her  about upcoming appointments. Patient set up for chemotherapy with plans for echo on 5/22, chemo education on 5/23 and port placement 5/23. Will start treatment on 5/30. Reviewed dates, times and locations of these appointments. That she will need to be NPO prior to port placement. All appointments are listed in [address] Will call back with any follow up questions.

## 2023-05-22 ENCOUNTER — HOSPITAL ENCOUNTER (OUTPATIENT)
Dept: CV DIAGNOSTICS | Facility: HOSPITAL | Age: 53
Discharge: HOME | End: 2023-05-22
Attending: INTERNAL MEDICINE
Payer: COMMERCIAL

## 2023-05-22 DIAGNOSIS — Z51.81 ENCOUNTER FOR MONITORING CARDIOTOXIC DRUG THERAPY: ICD-10-CM

## 2023-05-22 DIAGNOSIS — Z79.899 ENCOUNTER FOR MONITORING CARDIOTOXIC DRUG THERAPY: ICD-10-CM

## 2023-05-22 PROCEDURE — 93306 TTE W/DOPPLER COMPLETE: CPT | Performed by: INTERNAL MEDICINE

## 2023-05-23 ENCOUNTER — OFFICE VISIT (OUTPATIENT)
Dept: HEMATOLOGY/ONCOLOGY | Facility: HOSPITAL | Age: 53
End: 2023-05-23
Attending: INTERNAL MEDICINE
Payer: COMMERCIAL

## 2023-05-23 DIAGNOSIS — Z17.1 MALIGNANT NEOPLASM OF CENTRAL PORTION OF LEFT BREAST IN FEMALE, ESTROGEN RECEPTOR NEGATIVE (HCC): Primary | ICD-10-CM

## 2023-05-23 DIAGNOSIS — C50.112 MALIGNANT NEOPLASM OF CENTRAL PORTION OF LEFT BREAST IN FEMALE, ESTROGEN RECEPTOR NEGATIVE (HCC): Primary | ICD-10-CM

## 2023-05-23 DIAGNOSIS — Z71.9 HEALTH EDUCATION/COUNSELING: ICD-10-CM

## 2023-05-23 PROCEDURE — 99417 PROLNG OP E/M EACH 15 MIN: CPT | Performed by: NURSE PRACTITIONER

## 2023-05-23 PROCEDURE — 99215 OFFICE O/P EST HI 40 MIN: CPT | Performed by: NURSE PRACTITIONER

## 2023-05-23 RX ORDER — ONDANSETRON HYDROCHLORIDE 8 MG/1
8 TABLET, FILM COATED ORAL EVERY 8 HOURS PRN
Qty: 30 TABLET | Refills: 3 | Status: SHIPPED | OUTPATIENT
Start: 2023-05-23

## 2023-05-23 RX ORDER — PROCHLORPERAZINE MALEATE 10 MG
10 TABLET ORAL EVERY 6 HOURS PRN
Qty: 30 TABLET | Refills: 3 | Status: SHIPPED | OUTPATIENT
Start: 2023-05-23

## 2023-05-24 ENCOUNTER — HOSPITAL ENCOUNTER (OUTPATIENT)
Dept: GENERAL RADIOLOGY | Age: 53
Discharge: HOME | End: 2023-05-24
Attending: NURSE PRACTITIONER
Payer: COMMERCIAL

## 2023-05-24 ENCOUNTER — LAB ENCOUNTER (OUTPATIENT)
Dept: LAB | Age: 53
End: 2023-05-24
Attending: INTERNAL MEDICINE
Payer: COMMERCIAL

## 2023-05-24 DIAGNOSIS — Z17.1 MALIGNANT NEOPLASM OF CENTRAL PORTION OF LEFT BREAST IN FEMALE, ESTROGEN RECEPTOR NEGATIVE (HCC): ICD-10-CM

## 2023-05-24 DIAGNOSIS — C50.112 MALIGNANT NEOPLASM OF CENTRAL PORTION OF LEFT BREAST IN FEMALE, ESTROGEN RECEPTOR NEGATIVE (HCC): ICD-10-CM

## 2023-05-24 LAB
BASOPHILS # BLD AUTO: 0.02 X10(3) UL (ref 0–0.2)
BASOPHILS NFR BLD AUTO: 0.6 %
EOSINOPHIL # BLD AUTO: 0.08 X10(3) UL (ref 0–0.7)
EOSINOPHIL NFR BLD AUTO: 2.5 %
ERYTHROCYTE [DISTWIDTH] IN BLOOD BY AUTOMATED COUNT: 13.4 %
HCT VFR BLD AUTO: 35.1 %
HGB BLD-MCNC: 11 G/DL
IMM GRANULOCYTES # BLD AUTO: 0.01 X10(3) UL (ref 0–1)
IMM GRANULOCYTES NFR BLD: 0.3 %
LYMPHOCYTES # BLD AUTO: 1.3 X10(3) UL (ref 1–4)
LYMPHOCYTES NFR BLD AUTO: 40.5 %
MCH RBC QN AUTO: 30.4 PG (ref 26–34)
MCHC RBC AUTO-ENTMCNC: 31.3 G/DL (ref 31–37)
MCV RBC AUTO: 97 FL
MONOCYTES # BLD AUTO: 0.38 X10(3) UL (ref 0.1–1)
MONOCYTES NFR BLD AUTO: 11.8 %
NEUTROPHILS # BLD AUTO: 1.42 X10 (3) UL (ref 1.5–7.7)
NEUTROPHILS # BLD AUTO: 1.42 X10(3) UL (ref 1.5–7.7)
NEUTROPHILS NFR BLD AUTO: 44.3 %
PLATELET # BLD AUTO: 342 10(3)UL (ref 150–450)
RBC # BLD AUTO: 3.62 X10(6)UL
WBC # BLD AUTO: 3.2 X10(3) UL (ref 4–11)

## 2023-05-24 PROCEDURE — 36415 COLL VENOUS BLD VENIPUNCTURE: CPT

## 2023-05-24 PROCEDURE — 85025 COMPLETE CBC W/AUTO DIFF WBC: CPT

## 2023-05-24 PROCEDURE — 71046 X-RAY EXAM CHEST 2 VIEWS: CPT | Performed by: NURSE PRACTITIONER

## 2023-05-25 ENCOUNTER — HOSPITAL ENCOUNTER (OUTPATIENT)
Dept: INTERVENTIONAL RADIOLOGY/VASCULAR | Facility: HOSPITAL | Age: 53
Discharge: HOME | End: 2023-05-25
Attending: INTERNAL MEDICINE | Admitting: INTERNAL MEDICINE
Payer: COMMERCIAL

## 2023-05-25 VITALS
DIASTOLIC BLOOD PRESSURE: 67 MMHG | HEART RATE: 51 BPM | SYSTOLIC BLOOD PRESSURE: 113 MMHG | RESPIRATION RATE: 18 BRPM | OXYGEN SATURATION: 98 % | TEMPERATURE: 97 F

## 2023-05-25 DIAGNOSIS — C50.812 MALIGNANT NEOPLASM OF OVERLAPPING SITES OF LEFT BREAST IN FEMALE, ESTROGEN RECEPTOR NEGATIVE (HCC): ICD-10-CM

## 2023-05-25 DIAGNOSIS — Z17.1 MALIGNANT NEOPLASM OF OVERLAPPING SITES OF LEFT BREAST IN FEMALE, ESTROGEN RECEPTOR NEGATIVE (HCC): ICD-10-CM

## 2023-05-25 LAB
B-HCG UR QL: NEGATIVE
INR: 1 (ref 0.8–1.3)

## 2023-05-25 PROCEDURE — 99152 MOD SED SAME PHYS/QHP 5/>YRS: CPT | Performed by: RADIOLOGY

## 2023-05-25 PROCEDURE — 0JH60WZ INSERTION OF TOTALLY IMPLANTABLE VASCULAR ACCESS DEVICE INTO CHEST SUBCUTANEOUS TISSUE AND FASCIA, OPEN APPROACH: ICD-10-PCS | Performed by: RADIOLOGY

## 2023-05-25 PROCEDURE — 81025 URINE PREGNANCY TEST: CPT

## 2023-05-25 PROCEDURE — 99153 MOD SED SAME PHYS/QHP EA: CPT | Performed by: RADIOLOGY

## 2023-05-25 PROCEDURE — 85610 PROTHROMBIN TIME: CPT | Performed by: RADIOLOGY

## 2023-05-25 PROCEDURE — 36561 INSERT TUNNELED CV CATH: CPT | Performed by: RADIOLOGY

## 2023-05-25 PROCEDURE — 76937 US GUIDE VASCULAR ACCESS: CPT | Performed by: RADIOLOGY

## 2023-05-25 PROCEDURE — 02HV33Z INSERTION OF INFUSION DEVICE INTO SUPERIOR VENA CAVA, PERCUTANEOUS APPROACH: ICD-10-PCS | Performed by: RADIOLOGY

## 2023-05-25 PROCEDURE — 77001 FLUOROGUIDE FOR VEIN DEVICE: CPT | Performed by: RADIOLOGY

## 2023-05-25 RX ORDER — CEFAZOLIN SODIUM 1 G/3ML
INJECTION, POWDER, FOR SOLUTION INTRAMUSCULAR; INTRAVENOUS
Status: COMPLETED
Start: 2023-05-25 | End: 2023-05-25

## 2023-05-25 RX ORDER — LIDOCAINE HYDROCHLORIDE AND EPINEPHRINE BITARTRATE 20; .01 MG/ML; MG/ML
INJECTION, SOLUTION SUBCUTANEOUS
Status: COMPLETED
Start: 2023-05-25 | End: 2023-05-25

## 2023-05-25 RX ORDER — MIDAZOLAM HYDROCHLORIDE 1 MG/ML
INJECTION INTRAMUSCULAR; INTRAVENOUS
Status: COMPLETED
Start: 2023-05-25 | End: 2023-05-25

## 2023-05-25 RX ORDER — SODIUM CHLORIDE 9 MG/ML
INJECTION, SOLUTION INTRAVENOUS CONTINUOUS
Status: DISCONTINUED | OUTPATIENT
Start: 2023-05-25 | End: 2023-05-25

## 2023-05-25 RX ORDER — LIDOCAINE HYDROCHLORIDE 10 MG/ML
INJECTION, SOLUTION INFILTRATION; PERINEURAL
Status: COMPLETED
Start: 2023-05-25 | End: 2023-05-25

## 2023-05-25 RX ORDER — HEPARIN SODIUM 5000 [USP'U]/ML
INJECTION, SOLUTION INTRAVENOUS; SUBCUTANEOUS
Status: COMPLETED
Start: 2023-05-25 | End: 2023-05-25

## 2023-05-25 RX ORDER — VANCOMYCIN HYDROCHLORIDE 1 G/20ML
INJECTION, POWDER, LYOPHILIZED, FOR SOLUTION INTRAVENOUS
Status: COMPLETED
Start: 2023-05-25 | End: 2023-05-25

## 2023-05-25 RX ORDER — DIPHENHYDRAMINE HYDROCHLORIDE 50 MG/ML
INJECTION INTRAMUSCULAR; INTRAVENOUS
Status: COMPLETED
Start: 2023-05-25 | End: 2023-05-25

## 2023-05-25 NOTE — PROCEDURES
[facility]  Procedure Note    [address] [facility] Patient Status:  Outpatient    1970 MRN [id number]   [facility] Attending Matilde Vega MD   Hosp Day # 0 PCP Tresa Redman MD     Procedure: right chest port placement    Pre-Procedure Diagnosis:  Left breast cancer    Post-Procedure Diagnosis: same    Anesthesia:  Local and Sedation    Findings:  Tip in SVC, good blood return    Specimens: none    Blood Loss:  <10 ml    Tourniquet Time: n/a  Complications:  None  Drains:  none    Secondary Diagnosis:  none    Deanna Loza MD  2023

## 2023-05-25 NOTE — DISCHARGE INSTRUCTIONS
~ Follow up with MDs as scheduled. ~ Rest today and resume regular activity in the am as tolerated.   ~ No driving or drinking alcohol for 24hrs.   ~ Resume diet and medications.   ~ No lifting more than 10 pounds for 48hrs. Avoid lifting heavy objects such as a purse or a backpack from the shoulder of which the port was placed for 48hrs.   ~ Avoid strenuous activity with the arm of which th port was placed for 48hrs.   ~ Keep bandage completely dry and intact. Small white bandage can be removed tomorrow. Leave large brown bandage on and cancer nurses will remove that on and give further bathing instructions.   ~ Notify MD if any signs of infection. .. Fever, chills, redness, swelling or drainage.   ~ It is normal to have some discomfort at the incisions ite for the first 24-48hrs. You make take over the counter Tylenol if needed.   ~ Keep port card in your wallet.

## 2023-05-25 NOTE — PROGRESS NOTES
Pt s/p port placement in IR today. Pt recovered in holding. Right chest with mepilex dressing and padded tegader dressing. Both CDI. No bleeding or hematoma. Alexei Puff HOB elevated. Pt  at bedside. Pt drank apple juice. Discharge instructions reviewed with pt and , copy given. After recovery, pt out of bed with RN with no complaints. IV removed. Pt dressed. Pt discharged to [facility] via wheelchair by volunteer. Pt left with belongings. Pt  drove pt home.

## 2023-05-25 NOTE — INTERVAL H&P NOTE
The above referenced H&P was reviewed by Migue Avalos MD on 5/25/2023, the patient was examined and no significant changes have occurred in the patient's condition since the H&P was performed. Risks, benefits, alternative treatments and consequences of no treatment were discussed. We will proceed with procedure as planned.       Migue Avalos MD  5/25/2023  1:29 PM

## 2023-05-30 ENCOUNTER — OFFICE VISIT (OUTPATIENT)
Dept: HEMATOLOGY/ONCOLOGY | Facility: HOSPITAL | Age: 53
End: 2023-05-30
Attending: INTERNAL MEDICINE
Payer: COMMERCIAL

## 2023-05-30 ENCOUNTER — SOCIAL WORK SERVICES (OUTPATIENT)
Dept: HEMATOLOGY/ONCOLOGY | Facility: HOSPITAL | Age: 53
End: 2023-05-30

## 2023-05-30 VITALS
RESPIRATION RATE: 16 BRPM | SYSTOLIC BLOOD PRESSURE: 163 MMHG | DIASTOLIC BLOOD PRESSURE: 95 MMHG | HEART RATE: 91 BPM | WEIGHT: 181 LBS | BODY MASS INDEX: 29.09 KG/M2 | OXYGEN SATURATION: 99 % | TEMPERATURE: 98 F | HEIGHT: 66.22 IN

## 2023-05-30 DIAGNOSIS — Z51.11 CHEMOTHERAPY MANAGEMENT, ENCOUNTER FOR: ICD-10-CM

## 2023-05-30 DIAGNOSIS — C50.812 MALIGNANT NEOPLASM OF OVERLAPPING SITES OF LEFT BREAST IN FEMALE, ESTROGEN RECEPTOR NEGATIVE (HCC): Primary | ICD-10-CM

## 2023-05-30 DIAGNOSIS — Z17.1 MALIGNANT NEOPLASM OF CENTRAL PORTION OF LEFT BREAST IN FEMALE, ESTROGEN RECEPTOR NEGATIVE (HCC): ICD-10-CM

## 2023-05-30 DIAGNOSIS — Z17.1 MALIGNANT NEOPLASM OF OVERLAPPING SITES OF LEFT BREAST IN FEMALE, ESTROGEN RECEPTOR NEGATIVE (HCC): Primary | ICD-10-CM

## 2023-05-30 DIAGNOSIS — D70.9 NEUTROPENIA, UNSPECIFIED TYPE (HCC): ICD-10-CM

## 2023-05-30 DIAGNOSIS — Z51.81 ENCOUNTER FOR MONITORING CARDIOTOXIC DRUG THERAPY: Primary | ICD-10-CM

## 2023-05-30 DIAGNOSIS — C50.112 MALIGNANT NEOPLASM OF CENTRAL PORTION OF LEFT BREAST IN FEMALE, ESTROGEN RECEPTOR NEGATIVE (HCC): ICD-10-CM

## 2023-05-30 DIAGNOSIS — Z79.899 ENCOUNTER FOR MONITORING CARDIOTOXIC DRUG THERAPY: Primary | ICD-10-CM

## 2023-05-30 DIAGNOSIS — D05.12 DUCTAL CARCINOMA IN SITU (DCIS) OF LEFT BREAST: ICD-10-CM

## 2023-05-30 DIAGNOSIS — Z98.890 S/P LUMPECTOMY, LEFT BREAST: ICD-10-CM

## 2023-05-30 LAB
ALBUMIN SERPL-MCNC: 3.4 G/DL (ref 3.4–5)
ALBUMIN/GLOB SERPL: 0.7 {RATIO} (ref 1–2)
ALP LIVER SERPL-CCNC: 58 U/L
ALT SERPL-CCNC: 26 U/L
ANION GAP SERPL CALC-SCNC: 4 MMOL/L (ref 0–18)
AST SERPL-CCNC: 25 U/L (ref 15–37)
BASOPHILS # BLD AUTO: 0.02 X10(3) UL (ref 0–0.2)
BASOPHILS NFR BLD AUTO: 0.6 %
BILIRUB SERPL-MCNC: 0.2 MG/DL (ref 0.1–2)
BUN BLD-MCNC: 7 MG/DL (ref 7–18)
CALCIUM BLD-MCNC: 8.9 MG/DL (ref 8.5–10.1)
CHLORIDE SERPL-SCNC: 107 MMOL/L (ref 98–112)
CO2 SERPL-SCNC: 25 MMOL/L (ref 21–32)
CREAT BLD-MCNC: 0.71 MG/DL
EOSINOPHIL # BLD AUTO: 0.08 X10(3) UL (ref 0–0.7)
EOSINOPHIL NFR BLD AUTO: 2.3 %
ERYTHROCYTE [DISTWIDTH] IN BLOOD BY AUTOMATED COUNT: 14 %
FOLATE SERPL-MCNC: 18.9 NG/ML (ref 8.7–?)
GFR SERPLBLD BASED ON 1.73 SQ M-ARVRAT: 102 ML/MIN/1.73M2 (ref 60–?)
GLOBULIN PLAS-MCNC: 5.1 G/DL (ref 2.8–4.4)
GLUCOSE BLD-MCNC: 116 MG/DL (ref 70–99)
HCT VFR BLD AUTO: 34.6 %
HGB BLD-MCNC: 11.1 G/DL
IMM GRANULOCYTES # BLD AUTO: 0.01 X10(3) UL (ref 0–1)
IMM GRANULOCYTES NFR BLD: 0.3 %
LYMPHOCYTES # BLD AUTO: 1.46 X10(3) UL (ref 1–4)
LYMPHOCYTES NFR BLD AUTO: 41.4 %
MCH RBC QN AUTO: 30.2 PG (ref 26–34)
MCHC RBC AUTO-ENTMCNC: 32.1 G/DL (ref 31–37)
MCV RBC AUTO: 94.3 FL
MONOCYTES # BLD AUTO: 0.47 X10(3) UL (ref 0.1–1)
MONOCYTES NFR BLD AUTO: 13.3 %
NEUTROPHILS # BLD AUTO: 1.49 X10 (3) UL (ref 1.5–7.7)
NEUTROPHILS # BLD AUTO: 1.49 X10(3) UL (ref 1.5–7.7)
NEUTROPHILS NFR BLD AUTO: 42.1 %
OSMOLALITY SERPL CALC.SUM OF ELEC: 281 MOSM/KG (ref 275–295)
PLATELET # BLD AUTO: 262 10(3)UL (ref 150–450)
POTASSIUM SERPL-SCNC: 3.7 MMOL/L (ref 3.5–5.1)
PROT SERPL-MCNC: 8.5 G/DL (ref 6.4–8.2)
RBC # BLD AUTO: 3.67 X10(6)UL
SODIUM SERPL-SCNC: 136 MMOL/L (ref 136–145)
VIT B12 SERPL-MCNC: 774 PG/ML (ref 193–986)
WBC # BLD AUTO: 3.5 X10(3) UL (ref 4–11)

## 2023-05-30 PROCEDURE — 85025 COMPLETE CBC W/AUTO DIFF WBC: CPT

## 2023-05-30 PROCEDURE — 96375 TX/PRO/DX INJ NEW DRUG ADDON: CPT

## 2023-05-30 PROCEDURE — 80053 COMPREHEN METABOLIC PANEL: CPT

## 2023-05-30 PROCEDURE — 96413 CHEMO IV INFUSION 1 HR: CPT

## 2023-05-30 PROCEDURE — 82746 ASSAY OF FOLIC ACID SERUM: CPT

## 2023-05-30 PROCEDURE — S0028 INJECTION, FAMOTIDINE, 20 MG: HCPCS | Performed by: INTERNAL MEDICINE

## 2023-05-30 PROCEDURE — 96417 CHEMO IV INFUS EACH ADDL SEQ: CPT

## 2023-05-30 PROCEDURE — 99215 OFFICE O/P EST HI 40 MIN: CPT | Performed by: INTERNAL MEDICINE

## 2023-05-30 PROCEDURE — 82607 VITAMIN B-12: CPT

## 2023-05-30 RX ORDER — DIPHENHYDRAMINE HYDROCHLORIDE 50 MG/ML
25 INJECTION INTRAMUSCULAR; INTRAVENOUS ONCE
Status: CANCELLED | OUTPATIENT
Start: 2023-05-30

## 2023-05-30 RX ORDER — DIPHENHYDRAMINE HYDROCHLORIDE 50 MG/ML
25 INJECTION INTRAMUSCULAR; INTRAVENOUS ONCE
OUTPATIENT
Start: 2023-06-06

## 2023-05-30 RX ORDER — FAMOTIDINE 10 MG/ML
20 INJECTION, SOLUTION INTRAVENOUS ONCE
Status: CANCELLED | OUTPATIENT
Start: 2023-05-30

## 2023-05-30 RX ORDER — ACETAMINOPHEN 325 MG/1
650 TABLET ORAL ONCE
Status: COMPLETED | OUTPATIENT
Start: 2023-05-30 | End: 2023-05-30

## 2023-05-30 RX ORDER — FAMOTIDINE 10 MG/ML
20 INJECTION, SOLUTION INTRAVENOUS ONCE
OUTPATIENT
Start: 2023-06-06

## 2023-05-30 RX ORDER — DIPHENHYDRAMINE HYDROCHLORIDE 50 MG/ML
25 INJECTION INTRAMUSCULAR; INTRAVENOUS ONCE
OUTPATIENT
Start: 2023-06-13

## 2023-05-30 RX ORDER — ACETAMINOPHEN 325 MG/1
650 TABLET ORAL ONCE
Status: CANCELLED | OUTPATIENT
Start: 2023-05-30

## 2023-05-30 RX ORDER — DIPHENHYDRAMINE HYDROCHLORIDE 50 MG/ML
25 INJECTION INTRAMUSCULAR; INTRAVENOUS ONCE
Status: COMPLETED | OUTPATIENT
Start: 2023-05-30 | End: 2023-05-30

## 2023-05-30 RX ORDER — FAMOTIDINE 10 MG/ML
20 INJECTION, SOLUTION INTRAVENOUS ONCE
OUTPATIENT
Start: 2023-06-13

## 2023-05-30 RX ORDER — FAMOTIDINE 10 MG/ML
20 INJECTION, SOLUTION INTRAVENOUS ONCE
Status: COMPLETED | OUTPATIENT
Start: 2023-05-30 | End: 2023-05-30

## 2023-05-30 RX ADMIN — DIPHENHYDRAMINE HYDROCHLORIDE 25 MG: 50 INJECTION INTRAMUSCULAR; INTRAVENOUS at 10:43:00

## 2023-05-30 RX ADMIN — ACETAMINOPHEN 650 MG: 325 TABLET ORAL at 10:43:00

## 2023-05-30 RX ADMIN — FAMOTIDINE 20 MG: 10 INJECTION, SOLUTION INTRAVENOUS at 10:45:00

## 2023-05-30 NOTE — PROGRESS NOTES
Pt here for C1D1 Taxol/Trazimera. Arrives Ambulating independently, accompanied by Spouse       Oral medications included in this regimen:  no    Patient confirms comprehension of cancer treatment schedule:  yes    Pregnancy screening:  Denies possibility of pregnancy    Modifications in dose or schedule:  No    Medications appearance and physical integrity checked by RN. Chemotherapy IV pump settings verified by 2 RNs:  yes     Frequency of blood return and site check throughout administration: Prior to administration and At completion of therapy     Infusion/treatment outcome:  patient tolerated treatment without incident    Education Record    Learner:  Patient  Barriers / Limitations:  None  Method:  Brief focused and Reinforcement  Education / instructions given:  yes  Outcome:  Shows understanding    Discharged Home, Ambulating independently, accompanied by:Spouse    Patient/family verbalized understanding of future appointments: by printed AVS     Patient tolerated chemotherapy and discharged in stable condition.

## 2023-05-30 NOTE — PROGRESS NOTES
Patient is here for MD f/u and cycle 1 of chemo. Patient had port placement last week. She is a little nervous today but overall ok. Accompanied by her .        Education Record    Learner:  Patient    Disease / Diagnosis:  Breast cancer     Barriers / Limitations:  None   Comments:    Method:  Discussion   Comments:    General Topics:  Plan of care reviewed   Comments:    Outcome:  Shows understanding   Comments:

## 2023-05-30 NOTE — PROGRESS NOTES
met with patient and  Janet Wills in treatment room for introduction and role explanation. Patient scored 2 on Distress Screening, with Practical, Emotional, and Physical concerns. Patient admits that she was fearful and nervous at the beginning, but attributes her calmer demeanor to the care and communication received from staff so far at the [facility]. She said having the Chemo Education and the information provided helped her feel ready for treatment. Support and encouragement provided. Patient lives in [place] with  Janet Wills, and Son Any Frias. Their other Son Merlyn Islas lives in [place], but calls all the time and visits when able. She also states that she has a large family who lives out of state, but call regularly as a moral boost for her. She states that she has a great support system. Patient does not work, and  is a  for [employer]. Educated on [employer];  is aware of the benefit and states that it is not necessary at this time, as they just started, but will let  know if it is needed in the future. Social Determinants of Health assessment completed with patient and no needs identified at this time.  educated on Power of  for [employer], providing document for review; Patient is aware to reach out if they choose to complete. Educated on available resources, providing Social Work Support Packet including [address], [facility], Transportation, and [address] contacts. Educated on [facility] if desired. Contact provided and family is aware to reach out with any needs. Chemo Comfort Kit Donation given, which patient was grateful for.

## 2023-05-31 ENCOUNTER — TELEPHONE (OUTPATIENT)
Dept: HEMATOLOGY/ONCOLOGY | Facility: HOSPITAL | Age: 53
End: 2023-05-31

## 2023-05-31 RX ORDER — LIDOCAINE AND PRILOCAINE 25; 25 MG/G; MG/G
CREAM TOPICAL
Qty: 1 EACH | Refills: 1 | Status: SHIPPED | OUTPATIENT
Start: 2023-05-31

## 2023-05-31 NOTE — TELEPHONE ENCOUNTER
Toxicities: C1 D1 Paclitaxel/Trastuzumab-qyyp on 5/30/2023    I attempted to reach Fabienne to see how she is feeling after her first treatment. I left a voice mail message asking her to please return my call at her earliest convenience.

## 2023-06-06 ENCOUNTER — OFFICE VISIT (OUTPATIENT)
Dept: HEMATOLOGY/ONCOLOGY | Facility: HOSPITAL | Age: 53
End: 2023-06-06
Attending: INTERNAL MEDICINE
Payer: COMMERCIAL

## 2023-06-06 VITALS
OXYGEN SATURATION: 98 % | WEIGHT: 180.19 LBS | SYSTOLIC BLOOD PRESSURE: 135 MMHG | HEART RATE: 65 BPM | DIASTOLIC BLOOD PRESSURE: 81 MMHG | RESPIRATION RATE: 16 BRPM | BODY MASS INDEX: 29 KG/M2 | TEMPERATURE: 98 F

## 2023-06-06 DIAGNOSIS — D70.9 NEUTROPENIA, UNSPECIFIED TYPE (HCC): ICD-10-CM

## 2023-06-06 DIAGNOSIS — Z17.1 MALIGNANT NEOPLASM OF CENTRAL PORTION OF LEFT BREAST IN FEMALE, ESTROGEN RECEPTOR NEGATIVE (HCC): Primary | ICD-10-CM

## 2023-06-06 DIAGNOSIS — C50.112 MALIGNANT NEOPLASM OF CENTRAL PORTION OF LEFT BREAST IN FEMALE, ESTROGEN RECEPTOR NEGATIVE (HCC): Primary | ICD-10-CM

## 2023-06-06 LAB
BASOPHILS # BLD AUTO: 0.02 X10(3) UL (ref 0–0.2)
BASOPHILS NFR BLD AUTO: 0.7 %
EOSINOPHIL # BLD AUTO: 0.1 X10(3) UL (ref 0–0.7)
EOSINOPHIL NFR BLD AUTO: 3.6 %
ERYTHROCYTE [DISTWIDTH] IN BLOOD BY AUTOMATED COUNT: 13.2 %
HCT VFR BLD AUTO: 34.4 %
HGB BLD-MCNC: 11.4 G/DL
IMM GRANULOCYTES # BLD AUTO: 0.01 X10(3) UL (ref 0–1)
IMM GRANULOCYTES NFR BLD: 0.4 %
LYMPHOCYTES # BLD AUTO: 1.15 X10(3) UL (ref 1–4)
LYMPHOCYTES NFR BLD AUTO: 41.1 %
MCH RBC QN AUTO: 31.3 PG (ref 26–34)
MCHC RBC AUTO-ENTMCNC: 33.1 G/DL (ref 31–37)
MCV RBC AUTO: 94.5 FL
MONOCYTES # BLD AUTO: 0.28 X10(3) UL (ref 0.1–1)
MONOCYTES NFR BLD AUTO: 10 %
NEUTROPHILS # BLD AUTO: 1.24 X10 (3) UL (ref 1.5–7.7)
NEUTROPHILS # BLD AUTO: 1.24 X10(3) UL (ref 1.5–7.7)
NEUTROPHILS NFR BLD AUTO: 44.2 %
PLATELET # BLD AUTO: 245 10(3)UL (ref 150–450)
RBC # BLD AUTO: 3.64 X10(6)UL
WBC # BLD AUTO: 2.8 X10(3) UL (ref 4–11)

## 2023-06-06 PROCEDURE — 85025 COMPLETE CBC W/AUTO DIFF WBC: CPT

## 2023-06-06 PROCEDURE — 96375 TX/PRO/DX INJ NEW DRUG ADDON: CPT

## 2023-06-06 PROCEDURE — 99215 OFFICE O/P EST HI 40 MIN: CPT | Performed by: NURSE PRACTITIONER

## 2023-06-06 PROCEDURE — S0028 INJECTION, FAMOTIDINE, 20 MG: HCPCS | Performed by: INTERNAL MEDICINE

## 2023-06-06 PROCEDURE — 96413 CHEMO IV INFUSION 1 HR: CPT

## 2023-06-06 PROCEDURE — 96417 CHEMO IV INFUS EACH ADDL SEQ: CPT

## 2023-06-06 RX ORDER — FAMOTIDINE 10 MG/ML
20 INJECTION, SOLUTION INTRAVENOUS ONCE
Status: COMPLETED | OUTPATIENT
Start: 2023-06-06 | End: 2023-06-06

## 2023-06-06 RX ORDER — DIPHENHYDRAMINE HYDROCHLORIDE 50 MG/ML
25 INJECTION INTRAMUSCULAR; INTRAVENOUS ONCE
Status: COMPLETED | OUTPATIENT
Start: 2023-06-06 | End: 2023-06-06

## 2023-06-06 RX ADMIN — FAMOTIDINE 20 MG: 10 INJECTION, SOLUTION INTRAVENOUS at 11:22:00

## 2023-06-06 RX ADMIN — DIPHENHYDRAMINE HYDROCHLORIDE 25 MG: 50 INJECTION INTRAMUSCULAR; INTRAVENOUS at 11:22:00

## 2023-06-06 NOTE — PROGRESS NOTES
Pt here for C1D8 taxol, trazimera. Arrives Ambulating independently, accompanied by Self       Oral medications included in this regimen:  no    Patient confirms comprehension of cancer treatment schedule:  yes    Pregnancy screening:  Denies possibility of pregnancy    Modifications in dose or schedule:  No    Medications appearance and physical integrity checked by RN.  Chemotherapy IV pump settings verified by 2 RNs:  yes     Frequency of blood return and site check throughout administration: Prior to administration and At completion of therapy     Infusion/treatment outcome:  patient tolerated treatment without incident    Education Record    Learner:  Patient and Spouse  Barriers / Limitations:  None  Method:  Brief focused and Discussion  Education / instructions given:  Plan of care, appts  Outcome:  Shows understanding    Discharged Home, Ambulating independently, accompanied by:Self and Family member    Patient/family verbalized understanding of future appointments: by [facility]

## 2023-06-06 NOTE — PROGRESS NOTES
Patient presents with: Follow - Up: APN assessment prior to treatment  Chemotherapy    Pt is here for treatment - C1 D8 taxol/herceptin is expected. She states she is \"great\". Eating and drinking well; denies N,V,D. Had constipation for 2 days but resolved now after increasing fruit intake. Some pain with constipation in the abdomen; now resolved. Energy has been good.      Education Record    Learner:  Patient and Spouse      Disease / Diagnosis: breast cancer    Barriers / Limitations:  None   Comments:    Method:  Brief focused   Comments:    General Topics:  Diet, Medication, Pain, Side effects and symptom management and Plan of care reviewed   Comments:    Outcome:  Shows understanding   Comments:

## 2023-06-13 ENCOUNTER — OFFICE VISIT (OUTPATIENT)
Dept: HEMATOLOGY/ONCOLOGY | Facility: HOSPITAL | Age: 53
End: 2023-06-13
Attending: INTERNAL MEDICINE
Payer: COMMERCIAL

## 2023-06-13 VITALS
TEMPERATURE: 98 F | SYSTOLIC BLOOD PRESSURE: 146 MMHG | BODY MASS INDEX: 29.47 KG/M2 | HEIGHT: 66.22 IN | OXYGEN SATURATION: 98 % | WEIGHT: 183.38 LBS | HEART RATE: 90 BPM | RESPIRATION RATE: 18 BRPM | DIASTOLIC BLOOD PRESSURE: 87 MMHG

## 2023-06-13 DIAGNOSIS — C50.112 MALIGNANT NEOPLASM OF CENTRAL PORTION OF LEFT BREAST IN FEMALE, ESTROGEN RECEPTOR NEGATIVE (HCC): Primary | ICD-10-CM

## 2023-06-13 DIAGNOSIS — T45.1X5A ANTINEOPLASTIC CHEMOTHERAPY INDUCED ANEMIA: ICD-10-CM

## 2023-06-13 DIAGNOSIS — Z98.890 S/P LUMPECTOMY, LEFT BREAST: ICD-10-CM

## 2023-06-13 DIAGNOSIS — Z17.1 MALIGNANT NEOPLASM OF CENTRAL PORTION OF LEFT BREAST IN FEMALE, ESTROGEN RECEPTOR NEGATIVE (HCC): Primary | ICD-10-CM

## 2023-06-13 DIAGNOSIS — D05.12 DUCTAL CARCINOMA IN SITU (DCIS) OF LEFT BREAST: ICD-10-CM

## 2023-06-13 DIAGNOSIS — Z51.11 CHEMOTHERAPY MANAGEMENT, ENCOUNTER FOR: ICD-10-CM

## 2023-06-13 DIAGNOSIS — D64.81 ANTINEOPLASTIC CHEMOTHERAPY INDUCED ANEMIA: ICD-10-CM

## 2023-06-13 DIAGNOSIS — D70.9 NEUTROPENIA, UNSPECIFIED TYPE (HCC): ICD-10-CM

## 2023-06-13 LAB
BASOPHILS # BLD AUTO: 0.01 X10(3) UL (ref 0–0.2)
BASOPHILS NFR BLD AUTO: 0.3 %
EOSINOPHIL # BLD AUTO: 0.06 X10(3) UL (ref 0–0.7)
EOSINOPHIL NFR BLD AUTO: 1.9 %
ERYTHROCYTE [DISTWIDTH] IN BLOOD BY AUTOMATED COUNT: 13 %
HCT VFR BLD AUTO: 29.5 %
HGB BLD-MCNC: 9.7 G/DL
IMM GRANULOCYTES # BLD AUTO: 0.04 X10(3) UL (ref 0–1)
IMM GRANULOCYTES NFR BLD: 1.2 %
LYMPHOCYTES # BLD AUTO: 1.23 X10(3) UL (ref 1–4)
LYMPHOCYTES NFR BLD AUTO: 38 %
MCH RBC QN AUTO: 30.9 PG (ref 26–34)
MCHC RBC AUTO-ENTMCNC: 32.9 G/DL (ref 31–37)
MCV RBC AUTO: 93.9 FL
MONOCYTES # BLD AUTO: 0.38 X10(3) UL (ref 0.1–1)
MONOCYTES NFR BLD AUTO: 11.7 %
NEUTROPHILS # BLD AUTO: 1.52 X10 (3) UL (ref 1.5–7.7)
NEUTROPHILS # BLD AUTO: 1.52 X10(3) UL (ref 1.5–7.7)
NEUTROPHILS NFR BLD AUTO: 46.9 %
PLATELET # BLD AUTO: 273 10(3)UL (ref 150–450)
RBC # BLD AUTO: 3.14 X10(6)UL
WBC # BLD AUTO: 3.2 X10(3) UL (ref 4–11)

## 2023-06-13 PROCEDURE — 96413 CHEMO IV INFUSION 1 HR: CPT

## 2023-06-13 PROCEDURE — S0028 INJECTION, FAMOTIDINE, 20 MG: HCPCS | Performed by: INTERNAL MEDICINE

## 2023-06-13 PROCEDURE — 99215 OFFICE O/P EST HI 40 MIN: CPT | Performed by: INTERNAL MEDICINE

## 2023-06-13 PROCEDURE — 96417 CHEMO IV INFUS EACH ADDL SEQ: CPT

## 2023-06-13 PROCEDURE — 85025 COMPLETE CBC W/AUTO DIFF WBC: CPT

## 2023-06-13 PROCEDURE — 96375 TX/PRO/DX INJ NEW DRUG ADDON: CPT

## 2023-06-13 RX ORDER — FAMOTIDINE 10 MG/ML
20 INJECTION, SOLUTION INTRAVENOUS ONCE
Status: COMPLETED | OUTPATIENT
Start: 2023-06-13 | End: 2023-06-13

## 2023-06-13 RX ORDER — DIPHENHYDRAMINE HYDROCHLORIDE 50 MG/ML
25 INJECTION INTRAMUSCULAR; INTRAVENOUS ONCE
Status: COMPLETED | OUTPATIENT
Start: 2023-06-13 | End: 2023-06-13

## 2023-06-13 RX ADMIN — FAMOTIDINE 20 MG: 10 INJECTION, SOLUTION INTRAVENOUS at 10:26:00

## 2023-06-13 RX ADMIN — DIPHENHYDRAMINE HYDROCHLORIDE 25 MG: 50 INJECTION INTRAMUSCULAR; INTRAVENOUS at 10:26:00

## 2023-06-13 NOTE — PROGRESS NOTES
Patient here for C1D15 of Paclitaxel/Trazimera. Patient c/o of itchy eyes and ears. Not sure if it is seasonal related. Patient stated that her last period was lighter than usual. Patient had no other comments or complaints at this time.      Education Record    Learner:  Patient and Spouse    Disease / Diagnosis: Malignant neoplasm of left breast     Barriers / Limitations:  None   Comments:    Method:  Discussion   Comments:    General Topics:  Medication, Side effects and symptom management and Plan of care reviewed   Comments:    Outcome:  Shows understanding   Comments:

## 2023-06-13 NOTE — PROGRESS NOTES
Pt here for C1D15 taxol/trazimera. Arrives Ambulating independently, accompanied by Spouse       Oral medications included in this regimen:  no    Patient confirms comprehension of cancer treatment schedule:  yes    Pregnancy screening:  Denies possibility of pregnancy    Modifications in dose or schedule:  No    Medications appearance and physical integrity checked by RN.  Chemotherapy IV pump settings verified by 2 RNs:  yes     Frequency of blood return and site check throughout administration: Prior to administration, Prior to each drug and At completion of therapy     Infusion/treatment outcome:  patient tolerated treatment without incident    Education Record    Learner:  Patient and Spouse  Barriers / Limitations:  None  Method:  Discussion and Printed material  Education / instructions given:  Plan of care  Outcome:  Shows understanding    Discharged Home, Ambulating independently, accompanied by:Spouse    Patient/family verbalized understanding of future appointments: by printed AVS

## 2023-06-20 ENCOUNTER — OFFICE VISIT (OUTPATIENT)
Dept: HEMATOLOGY/ONCOLOGY | Facility: HOSPITAL | Age: 53
End: 2023-06-20
Attending: INTERNAL MEDICINE
Payer: COMMERCIAL

## 2023-06-20 VITALS
BODY MASS INDEX: 29.44 KG/M2 | TEMPERATURE: 98 F | WEIGHT: 183.19 LBS | SYSTOLIC BLOOD PRESSURE: 144 MMHG | DIASTOLIC BLOOD PRESSURE: 86 MMHG | HEIGHT: 66.22 IN | RESPIRATION RATE: 18 BRPM | OXYGEN SATURATION: 100 % | HEART RATE: 100 BPM

## 2023-06-20 DIAGNOSIS — C50.112 MALIGNANT NEOPLASM OF CENTRAL PORTION OF LEFT BREAST IN FEMALE, ESTROGEN RECEPTOR NEGATIVE (HCC): ICD-10-CM

## 2023-06-20 DIAGNOSIS — Z17.1 MALIGNANT NEOPLASM OF CENTRAL PORTION OF LEFT BREAST IN FEMALE, ESTROGEN RECEPTOR NEGATIVE (HCC): ICD-10-CM

## 2023-06-20 DIAGNOSIS — Z79.899 ENCOUNTER FOR MONITORING CARDIOTOXIC DRUG THERAPY: Primary | ICD-10-CM

## 2023-06-20 DIAGNOSIS — Z51.81 ENCOUNTER FOR MONITORING CARDIOTOXIC DRUG THERAPY: Primary | ICD-10-CM

## 2023-06-20 LAB
ALBUMIN SERPL-MCNC: 3 G/DL (ref 3.4–5)
ALBUMIN/GLOB SERPL: 0.6 {RATIO} (ref 1–2)
ALP LIVER SERPL-CCNC: 62 U/L
ALT SERPL-CCNC: 35 U/L
ANION GAP SERPL CALC-SCNC: 6 MMOL/L (ref 0–18)
AST SERPL-CCNC: 26 U/L (ref 15–37)
BASOPHILS # BLD: 0 X10(3) UL (ref 0–0.2)
BASOPHILS NFR BLD: 0 %
BILIRUB SERPL-MCNC: 0.2 MG/DL (ref 0.1–2)
BUN BLD-MCNC: 12 MG/DL (ref 7–18)
CALCIUM BLD-MCNC: 8.9 MG/DL (ref 8.5–10.1)
CHLORIDE SERPL-SCNC: 109 MMOL/L (ref 98–112)
CO2 SERPL-SCNC: 25 MMOL/L (ref 21–32)
CREAT BLD-MCNC: 0.8 MG/DL
EOSINOPHIL # BLD: 0.08 X10(3) UL (ref 0–0.7)
EOSINOPHIL NFR BLD: 2 %
ERYTHROCYTE [DISTWIDTH] IN BLOOD BY AUTOMATED COUNT: 13.1 %
FASTING STATUS PATIENT QL REPORTED: NO
GFR SERPLBLD BASED ON 1.73 SQ M-ARVRAT: 89 ML/MIN/1.73M2 (ref 60–?)
GLOBULIN PLAS-MCNC: 4.7 G/DL (ref 2.8–4.4)
GLUCOSE BLD-MCNC: 174 MG/DL (ref 70–99)
HCT VFR BLD AUTO: 29.8 %
HGB BLD-MCNC: 9.6 G/DL
LYMPHOCYTES NFR BLD: 1.01 X10(3) UL (ref 1–4)
LYMPHOCYTES NFR BLD: 24 %
MCH RBC QN AUTO: 31 PG (ref 26–34)
MCHC RBC AUTO-ENTMCNC: 32.2 G/DL (ref 31–37)
MCV RBC AUTO: 96.1 FL
METAMYELOCYTES # BLD: 0.13 X10(3) UL
METAMYELOCYTES NFR BLD: 3 %
MONOCYTES # BLD: 0.29 X10(3) UL (ref 0.1–1)
MONOCYTES NFR BLD: 7 %
MORPHOLOGY: NORMAL
MYELOCYTES # BLD: 0.04 X10(3) UL
MYELOCYTES NFR BLD: 1 %
NEUTROPHILS # BLD AUTO: 2.27 X10 (3) UL (ref 1.5–7.7)
NEUTROPHILS NFR BLD: 58 %
NEUTS BAND NFR BLD: 5 %
NEUTS HYPERSEG # BLD: 2.65 X10(3) UL (ref 1.5–7.7)
NRBC BLD MANUAL-RTO: 1 %
OSMOLALITY SERPL CALC.SUM OF ELEC: 294 MOSM/KG (ref 275–295)
PLATELET # BLD AUTO: 358 10(3)UL (ref 150–450)
PLATELET MORPHOLOGY: NORMAL
POTASSIUM SERPL-SCNC: 3.7 MMOL/L (ref 3.5–5.1)
PROT SERPL-MCNC: 7.7 G/DL (ref 6.4–8.2)
RBC # BLD AUTO: 3.1 X10(6)UL
SODIUM SERPL-SCNC: 140 MMOL/L (ref 136–145)
TOTAL CELLS COUNTED BLD: 100
WBC # BLD AUTO: 4.2 X10(3) UL (ref 4–11)

## 2023-06-20 PROCEDURE — 85007 BL SMEAR W/DIFF WBC COUNT: CPT

## 2023-06-20 PROCEDURE — S0028 INJECTION, FAMOTIDINE, 20 MG: HCPCS | Performed by: INTERNAL MEDICINE

## 2023-06-20 PROCEDURE — 85027 COMPLETE CBC AUTOMATED: CPT

## 2023-06-20 PROCEDURE — 80053 COMPREHEN METABOLIC PANEL: CPT

## 2023-06-20 PROCEDURE — 96375 TX/PRO/DX INJ NEW DRUG ADDON: CPT

## 2023-06-20 PROCEDURE — 85025 COMPLETE CBC W/AUTO DIFF WBC: CPT

## 2023-06-20 PROCEDURE — 96417 CHEMO IV INFUS EACH ADDL SEQ: CPT

## 2023-06-20 PROCEDURE — 96413 CHEMO IV INFUSION 1 HR: CPT

## 2023-06-20 RX ORDER — FAMOTIDINE 10 MG/ML
20 INJECTION, SOLUTION INTRAVENOUS ONCE
Status: COMPLETED | OUTPATIENT
Start: 2023-06-20 | End: 2023-06-20

## 2023-06-20 RX ORDER — DIPHENHYDRAMINE HYDROCHLORIDE 50 MG/ML
25 INJECTION INTRAMUSCULAR; INTRAVENOUS ONCE
Status: COMPLETED | OUTPATIENT
Start: 2023-06-20 | End: 2023-06-20

## 2023-06-20 RX ADMIN — FAMOTIDINE 20 MG: 10 INJECTION, SOLUTION INTRAVENOUS at 10:48:00

## 2023-06-20 RX ADMIN — DIPHENHYDRAMINE HYDROCHLORIDE 25 MG: 50 INJECTION INTRAMUSCULAR; INTRAVENOUS at 10:35:00

## 2023-06-20 NOTE — PROGRESS NOTES
Pt here for C2D1 Taxol/Herceptin. Arrives Ambulating independently, accompanied by Spouse       Oral medications included in this regimen:  no    Patient confirms comprehension of cancer treatment schedule:  yes    Pregnancy screening:  Denies possibility of pregnancy    Modifications in dose or schedule:  No    Medications appearance and physical integrity checked by RN.  Chemotherapy IV pump settings verified by 2 RNs:  yes     Frequency of blood return and site check throughout administration: Prior to administration and At completion of therapy     Infusion/treatment outcome:  patient tolerated treatment without incident    Education Record    Learner:  Patient and Spouse  Barriers / Limitations:  None  Method:  Brief focused  Education / instructions given:  Schedule reviewed  Outcome:  Shows understanding    Discharged Home, Ambulating independently, accompanied by:Spouse    Patient/family verbalized understanding of future appointments: by [facility]

## 2023-06-27 ENCOUNTER — OFFICE VISIT (OUTPATIENT)
Dept: HEMATOLOGY/ONCOLOGY | Facility: HOSPITAL | Age: 53
End: 2023-06-27
Attending: INTERNAL MEDICINE
Payer: COMMERCIAL

## 2023-06-27 VITALS
TEMPERATURE: 98 F | BODY MASS INDEX: 30.16 KG/M2 | RESPIRATION RATE: 20 BRPM | DIASTOLIC BLOOD PRESSURE: 82 MMHG | HEIGHT: 66.22 IN | HEART RATE: 95 BPM | OXYGEN SATURATION: 97 % | SYSTOLIC BLOOD PRESSURE: 141 MMHG | WEIGHT: 187.63 LBS

## 2023-06-27 DIAGNOSIS — Z17.1 MALIGNANT NEOPLASM OF CENTRAL PORTION OF LEFT BREAST IN FEMALE, ESTROGEN RECEPTOR NEGATIVE (HCC): ICD-10-CM

## 2023-06-27 DIAGNOSIS — Z17.1 MALIGNANT NEOPLASM OF OVERLAPPING SITES OF LEFT BREAST IN FEMALE, ESTROGEN RECEPTOR NEGATIVE (HCC): Primary | ICD-10-CM

## 2023-06-27 DIAGNOSIS — D05.12 DUCTAL CARCINOMA IN SITU (DCIS) OF LEFT BREAST: ICD-10-CM

## 2023-06-27 DIAGNOSIS — D70.9 NEUTROPENIA, UNSPECIFIED TYPE (HCC): ICD-10-CM

## 2023-06-27 DIAGNOSIS — Z79.899 ENCOUNTER FOR MONITORING CARDIOTOXIC DRUG THERAPY: Primary | ICD-10-CM

## 2023-06-27 DIAGNOSIS — Z98.890 S/P LUMPECTOMY, LEFT BREAST: ICD-10-CM

## 2023-06-27 DIAGNOSIS — C50.812 MALIGNANT NEOPLASM OF OVERLAPPING SITES OF LEFT BREAST IN FEMALE, ESTROGEN RECEPTOR NEGATIVE (HCC): Primary | ICD-10-CM

## 2023-06-27 DIAGNOSIS — D64.81 ANTINEOPLASTIC CHEMOTHERAPY INDUCED ANEMIA: ICD-10-CM

## 2023-06-27 DIAGNOSIS — T45.1X5A ANTINEOPLASTIC CHEMOTHERAPY INDUCED ANEMIA: ICD-10-CM

## 2023-06-27 DIAGNOSIS — Z51.81 ENCOUNTER FOR MONITORING CARDIOTOXIC DRUG THERAPY: Primary | ICD-10-CM

## 2023-06-27 DIAGNOSIS — Z51.11 CHEMOTHERAPY MANAGEMENT, ENCOUNTER FOR: ICD-10-CM

## 2023-06-27 DIAGNOSIS — C50.112 MALIGNANT NEOPLASM OF CENTRAL PORTION OF LEFT BREAST IN FEMALE, ESTROGEN RECEPTOR NEGATIVE (HCC): ICD-10-CM

## 2023-06-27 LAB
BASOPHILS # BLD AUTO: 0.02 X10(3) UL (ref 0–0.2)
BASOPHILS NFR BLD AUTO: 0.6 %
EOSINOPHIL # BLD AUTO: 0.1 X10(3) UL (ref 0–0.7)
EOSINOPHIL NFR BLD AUTO: 2.9 %
ERYTHROCYTE [DISTWIDTH] IN BLOOD BY AUTOMATED COUNT: 13.6 %
HCT VFR BLD AUTO: 28.5 %
HGB BLD-MCNC: 9.3 G/DL
IMM GRANULOCYTES # BLD AUTO: 0.08 X10(3) UL (ref 0–1)
IMM GRANULOCYTES NFR BLD: 2.3 %
LYMPHOCYTES # BLD AUTO: 1.08 X10(3) UL (ref 1–4)
LYMPHOCYTES NFR BLD AUTO: 31.1 %
MCH RBC QN AUTO: 30.6 PG (ref 26–34)
MCHC RBC AUTO-ENTMCNC: 32.6 G/DL (ref 31–37)
MCV RBC AUTO: 93.8 FL
MONOCYTES # BLD AUTO: 0.35 X10(3) UL (ref 0.1–1)
MONOCYTES NFR BLD AUTO: 10.1 %
NEUTROPHILS # BLD AUTO: 1.84 X10 (3) UL (ref 1.5–7.7)
NEUTROPHILS # BLD AUTO: 1.84 X10(3) UL (ref 1.5–7.7)
NEUTROPHILS NFR BLD AUTO: 53 %
PLATELET # BLD AUTO: 324 10(3)UL (ref 150–450)
RBC # BLD AUTO: 3.04 X10(6)UL
WBC # BLD AUTO: 3.5 X10(3) UL (ref 4–11)

## 2023-06-27 PROCEDURE — 96417 CHEMO IV INFUS EACH ADDL SEQ: CPT

## 2023-06-27 PROCEDURE — 96413 CHEMO IV INFUSION 1 HR: CPT

## 2023-06-27 PROCEDURE — 85025 COMPLETE CBC W/AUTO DIFF WBC: CPT

## 2023-06-27 PROCEDURE — S0028 INJECTION, FAMOTIDINE, 20 MG: HCPCS | Performed by: INTERNAL MEDICINE

## 2023-06-27 PROCEDURE — 99215 OFFICE O/P EST HI 40 MIN: CPT | Performed by: INTERNAL MEDICINE

## 2023-06-27 PROCEDURE — 96375 TX/PRO/DX INJ NEW DRUG ADDON: CPT

## 2023-06-27 RX ORDER — DIPHENHYDRAMINE HYDROCHLORIDE 50 MG/ML
25 INJECTION INTRAMUSCULAR; INTRAVENOUS ONCE
Status: CANCELLED | OUTPATIENT
Start: 2023-06-27

## 2023-06-27 RX ORDER — AMOXICILLIN AND CLAVULANATE POTASSIUM 875; 125 MG/1; MG/1
1 TABLET, FILM COATED ORAL EVERY 12 HOURS
COMMUNITY
Start: 2023-06-23

## 2023-06-27 RX ORDER — FAMOTIDINE 10 MG/ML
20 INJECTION, SOLUTION INTRAVENOUS ONCE
Status: COMPLETED | OUTPATIENT
Start: 2023-06-27 | End: 2023-06-27

## 2023-06-27 RX ORDER — FAMOTIDINE 10 MG/ML
20 INJECTION, SOLUTION INTRAVENOUS ONCE
Status: CANCELLED | OUTPATIENT
Start: 2023-07-04

## 2023-06-27 RX ORDER — FAMOTIDINE 10 MG/ML
20 INJECTION, SOLUTION INTRAVENOUS ONCE
Status: CANCELLED | OUTPATIENT
Start: 2023-06-27

## 2023-06-27 RX ORDER — DIPHENHYDRAMINE HYDROCHLORIDE 50 MG/ML
25 INJECTION INTRAMUSCULAR; INTRAVENOUS ONCE
Status: COMPLETED | OUTPATIENT
Start: 2023-06-27 | End: 2023-06-27

## 2023-06-27 RX ORDER — DIPHENHYDRAMINE HYDROCHLORIDE 50 MG/ML
25 INJECTION INTRAMUSCULAR; INTRAVENOUS ONCE
Status: CANCELLED | OUTPATIENT
Start: 2023-07-04

## 2023-06-27 RX ADMIN — DIPHENHYDRAMINE HYDROCHLORIDE 25 MG: 50 INJECTION INTRAMUSCULAR; INTRAVENOUS at 11:53:00

## 2023-06-27 RX ADMIN — FAMOTIDINE 20 MG: 10 INJECTION, SOLUTION INTRAVENOUS at 11:56:00

## 2023-07-03 ENCOUNTER — OFFICE VISIT (OUTPATIENT)
Dept: HEMATOLOGY/ONCOLOGY | Facility: HOSPITAL | Age: 53
End: 2023-07-03
Attending: INTERNAL MEDICINE
Payer: COMMERCIAL

## 2023-07-03 VITALS
DIASTOLIC BLOOD PRESSURE: 92 MMHG | RESPIRATION RATE: 18 BRPM | WEIGHT: 183.63 LBS | BODY MASS INDEX: 29.51 KG/M2 | OXYGEN SATURATION: 98 % | TEMPERATURE: 98 F | HEIGHT: 66.22 IN | SYSTOLIC BLOOD PRESSURE: 144 MMHG | HEART RATE: 92 BPM

## 2023-07-03 DIAGNOSIS — T45.1X5A ANTINEOPLASTIC CHEMOTHERAPY INDUCED ANEMIA: ICD-10-CM

## 2023-07-03 DIAGNOSIS — C50.112 MALIGNANT NEOPLASM OF CENTRAL PORTION OF LEFT BREAST IN FEMALE, ESTROGEN RECEPTOR NEGATIVE (HCC): Primary | ICD-10-CM

## 2023-07-03 DIAGNOSIS — D64.81 ANTINEOPLASTIC CHEMOTHERAPY INDUCED ANEMIA: ICD-10-CM

## 2023-07-03 DIAGNOSIS — Z17.1 MALIGNANT NEOPLASM OF CENTRAL PORTION OF LEFT BREAST IN FEMALE, ESTROGEN RECEPTOR NEGATIVE (HCC): Primary | ICD-10-CM

## 2023-07-03 LAB
BASOPHILS # BLD AUTO: 0.02 X10(3) UL (ref 0–0.2)
BASOPHILS NFR BLD AUTO: 0.6 %
DEPRECATED HBV CORE AB SER IA-ACNC: 42 NG/ML
EOSINOPHIL # BLD AUTO: 0.07 X10(3) UL (ref 0–0.7)
EOSINOPHIL NFR BLD AUTO: 2.2 %
ERYTHROCYTE [DISTWIDTH] IN BLOOD BY AUTOMATED COUNT: 13.9 %
HCT VFR BLD AUTO: 31.3 %
HGB BLD-MCNC: 9.9 G/DL
IMM GRANULOCYTES # BLD AUTO: 0.04 X10(3) UL (ref 0–1)
IMM GRANULOCYTES NFR BLD: 1.3 %
IRON SATN MFR SERPL: 25 %
IRON SERPL-MCNC: 103 UG/DL
LYMPHOCYTES # BLD AUTO: 0.97 X10(3) UL (ref 1–4)
LYMPHOCYTES NFR BLD AUTO: 30.4 %
MCH RBC QN AUTO: 30 PG (ref 26–34)
MCHC RBC AUTO-ENTMCNC: 31.6 G/DL (ref 31–37)
MCV RBC AUTO: 94.8 FL
MONOCYTES # BLD AUTO: 0.24 X10(3) UL (ref 0.1–1)
MONOCYTES NFR BLD AUTO: 7.5 %
NEUTROPHILS # BLD AUTO: 1.85 X10 (3) UL (ref 1.5–7.7)
NEUTROPHILS # BLD AUTO: 1.85 X10(3) UL (ref 1.5–7.7)
NEUTROPHILS NFR BLD AUTO: 58 %
PLATELET # BLD AUTO: 324 10(3)UL (ref 150–450)
RBC # BLD AUTO: 3.3 X10(6)UL
TIBC SERPL-MCNC: 419 UG/DL (ref 240–450)
TRANSFERRIN SERPL-MCNC: 281 MG/DL (ref 200–360)
WBC # BLD AUTO: 3.2 X10(3) UL (ref 4–11)

## 2023-07-03 PROCEDURE — S0028 INJECTION, FAMOTIDINE, 20 MG: HCPCS | Performed by: INTERNAL MEDICINE

## 2023-07-03 PROCEDURE — 82728 ASSAY OF FERRITIN: CPT

## 2023-07-03 PROCEDURE — 96375 TX/PRO/DX INJ NEW DRUG ADDON: CPT

## 2023-07-03 PROCEDURE — 83540 ASSAY OF IRON: CPT

## 2023-07-03 PROCEDURE — 96413 CHEMO IV INFUSION 1 HR: CPT

## 2023-07-03 PROCEDURE — 83550 IRON BINDING TEST: CPT

## 2023-07-03 PROCEDURE — 85025 COMPLETE CBC W/AUTO DIFF WBC: CPT

## 2023-07-03 PROCEDURE — 96417 CHEMO IV INFUS EACH ADDL SEQ: CPT

## 2023-07-03 RX ORDER — FAMOTIDINE 10 MG/ML
20 INJECTION, SOLUTION INTRAVENOUS ONCE
Status: COMPLETED | OUTPATIENT
Start: 2023-07-03 | End: 2023-07-03

## 2023-07-03 RX ORDER — DIPHENHYDRAMINE HYDROCHLORIDE 50 MG/ML
25 INJECTION INTRAMUSCULAR; INTRAVENOUS ONCE
Status: COMPLETED | OUTPATIENT
Start: 2023-07-03 | End: 2023-07-03

## 2023-07-03 RX ADMIN — DIPHENHYDRAMINE HYDROCHLORIDE 25 MG: 50 INJECTION INTRAMUSCULAR; INTRAVENOUS at 12:12:00

## 2023-07-03 RX ADMIN — FAMOTIDINE 20 MG: 10 INJECTION, SOLUTION INTRAVENOUS at 12:12:00

## 2023-07-11 ENCOUNTER — OFFICE VISIT (OUTPATIENT)
Dept: HEMATOLOGY/ONCOLOGY | Facility: HOSPITAL | Age: 53
End: 2023-07-11
Attending: INTERNAL MEDICINE
Payer: COMMERCIAL

## 2023-07-11 VITALS
RESPIRATION RATE: 18 BRPM | HEART RATE: 86 BPM | HEIGHT: 66.22 IN | BODY MASS INDEX: 29.51 KG/M2 | WEIGHT: 183.63 LBS | OXYGEN SATURATION: 97 % | TEMPERATURE: 98 F | DIASTOLIC BLOOD PRESSURE: 97 MMHG | SYSTOLIC BLOOD PRESSURE: 161 MMHG

## 2023-07-11 DIAGNOSIS — D70.9 NEUTROPENIA, UNSPECIFIED TYPE (HCC): ICD-10-CM

## 2023-07-11 DIAGNOSIS — D50.0 IRON DEFICIENCY ANEMIA DUE TO CHRONIC BLOOD LOSS: ICD-10-CM

## 2023-07-11 DIAGNOSIS — T45.1X5A ANTINEOPLASTIC CHEMOTHERAPY INDUCED ANEMIA: ICD-10-CM

## 2023-07-11 DIAGNOSIS — D05.12 DUCTAL CARCINOMA IN SITU (DCIS) OF LEFT BREAST: ICD-10-CM

## 2023-07-11 DIAGNOSIS — Z98.890 S/P LUMPECTOMY, LEFT BREAST: ICD-10-CM

## 2023-07-11 DIAGNOSIS — C50.112 MALIGNANT NEOPLASM OF CENTRAL PORTION OF LEFT BREAST IN FEMALE, ESTROGEN RECEPTOR NEGATIVE: ICD-10-CM

## 2023-07-11 DIAGNOSIS — C50.812 MALIGNANT NEOPLASM OF OVERLAPPING SITES OF LEFT BREAST IN FEMALE, ESTROGEN RECEPTOR NEGATIVE: ICD-10-CM

## 2023-07-11 DIAGNOSIS — Z17.1 MALIGNANT NEOPLASM OF CENTRAL PORTION OF LEFT BREAST IN FEMALE, ESTROGEN RECEPTOR NEGATIVE: Primary | ICD-10-CM

## 2023-07-11 DIAGNOSIS — Z51.81 ENCOUNTER FOR MONITORING CARDIOTOXIC DRUG THERAPY: Primary | ICD-10-CM

## 2023-07-11 DIAGNOSIS — Z17.1 MALIGNANT NEOPLASM OF OVERLAPPING SITES OF LEFT BREAST IN FEMALE, ESTROGEN RECEPTOR NEGATIVE: ICD-10-CM

## 2023-07-11 DIAGNOSIS — D64.81 ANTINEOPLASTIC CHEMOTHERAPY INDUCED ANEMIA: ICD-10-CM

## 2023-07-11 DIAGNOSIS — Z51.11 CHEMOTHERAPY MANAGEMENT, ENCOUNTER FOR: ICD-10-CM

## 2023-07-11 DIAGNOSIS — Z17.1 MALIGNANT NEOPLASM OF CENTRAL PORTION OF LEFT BREAST IN FEMALE, ESTROGEN RECEPTOR NEGATIVE: ICD-10-CM

## 2023-07-11 DIAGNOSIS — Z79.899 ENCOUNTER FOR MONITORING CARDIOTOXIC DRUG THERAPY: Primary | ICD-10-CM

## 2023-07-11 DIAGNOSIS — C50.112 MALIGNANT NEOPLASM OF CENTRAL PORTION OF LEFT BREAST IN FEMALE, ESTROGEN RECEPTOR NEGATIVE: Primary | ICD-10-CM

## 2023-07-11 PROBLEM — N18.9 ANEMIA DUE TO CHRONIC KIDNEY DISEASE: Status: ACTIVE | Noted: 2023-07-11

## 2023-07-11 PROBLEM — D63.1 ANEMIA DUE TO CHRONIC KIDNEY DISEASE: Status: ACTIVE | Noted: 2023-07-11

## 2023-07-11 LAB
ALBUMIN SERPL-MCNC: 3 G/DL (ref 3.4–5)
ALBUMIN/GLOB SERPL: 0.7 {RATIO} (ref 1–2)
ALP LIVER SERPL-CCNC: 67 U/L
ALT SERPL-CCNC: 26 U/L
ANION GAP SERPL CALC-SCNC: 5 MMOL/L (ref 0–18)
AST SERPL-CCNC: 17 U/L (ref 15–37)
BASOPHILS # BLD AUTO: 0.02 X10(3) UL (ref 0–0.2)
BASOPHILS NFR BLD AUTO: 0.6 %
BILIRUB SERPL-MCNC: 0.2 MG/DL (ref 0.1–2)
BUN BLD-MCNC: 14 MG/DL (ref 7–18)
CALCIUM BLD-MCNC: 9 MG/DL (ref 8.5–10.1)
CHLORIDE SERPL-SCNC: 110 MMOL/L (ref 98–112)
CO2 SERPL-SCNC: 26 MMOL/L (ref 21–32)
CREAT BLD-MCNC: 0.82 MG/DL
EOSINOPHIL # BLD AUTO: 0.05 X10(3) UL (ref 0–0.7)
EOSINOPHIL NFR BLD AUTO: 1.6 %
ERYTHROCYTE [DISTWIDTH] IN BLOOD BY AUTOMATED COUNT: 14.3 %
GFR SERPLBLD BASED ON 1.73 SQ M-ARVRAT: 86 ML/MIN/1.73M2 (ref 60–?)
GLOBULIN PLAS-MCNC: 4.3 G/DL (ref 2.8–4.4)
GLUCOSE BLD-MCNC: 195 MG/DL (ref 70–99)
HCT VFR BLD AUTO: 31.5 %
HGB BLD-MCNC: 10 G/DL
IMM GRANULOCYTES # BLD AUTO: 0.05 X10(3) UL (ref 0–1)
IMM GRANULOCYTES NFR BLD: 1.6 %
LYMPHOCYTES # BLD AUTO: 1.18 X10(3) UL (ref 1–4)
LYMPHOCYTES NFR BLD AUTO: 36.9 %
MCH RBC QN AUTO: 30 PG (ref 26–34)
MCHC RBC AUTO-ENTMCNC: 31.7 G/DL (ref 31–37)
MCV RBC AUTO: 94.6 FL
MONOCYTES # BLD AUTO: 0.28 X10(3) UL (ref 0.1–1)
MONOCYTES NFR BLD AUTO: 8.8 %
NEUTROPHILS # BLD AUTO: 1.62 X10 (3) UL (ref 1.5–7.7)
NEUTROPHILS # BLD AUTO: 1.62 X10(3) UL (ref 1.5–7.7)
NEUTROPHILS NFR BLD AUTO: 50.5 %
OSMOLALITY SERPL CALC.SUM OF ELEC: 298 MOSM/KG (ref 275–295)
PLATELET # BLD AUTO: 323 10(3)UL (ref 150–450)
POTASSIUM SERPL-SCNC: 3.7 MMOL/L (ref 3.5–5.1)
PROT SERPL-MCNC: 7.3 G/DL (ref 6.4–8.2)
RBC # BLD AUTO: 3.33 X10(6)UL
SODIUM SERPL-SCNC: 141 MMOL/L (ref 136–145)
WBC # BLD AUTO: 3.2 X10(3) UL (ref 4–11)

## 2023-07-11 PROCEDURE — S0028 INJECTION, FAMOTIDINE, 20 MG: HCPCS | Performed by: INTERNAL MEDICINE

## 2023-07-11 PROCEDURE — 96375 TX/PRO/DX INJ NEW DRUG ADDON: CPT

## 2023-07-11 PROCEDURE — 85025 COMPLETE CBC W/AUTO DIFF WBC: CPT

## 2023-07-11 PROCEDURE — 96413 CHEMO IV INFUSION 1 HR: CPT

## 2023-07-11 PROCEDURE — 80053 COMPREHEN METABOLIC PANEL: CPT

## 2023-07-11 PROCEDURE — 99215 OFFICE O/P EST HI 40 MIN: CPT | Performed by: INTERNAL MEDICINE

## 2023-07-11 PROCEDURE — 96417 CHEMO IV INFUS EACH ADDL SEQ: CPT

## 2023-07-11 RX ORDER — DIPHENHYDRAMINE HYDROCHLORIDE 50 MG/ML
25 INJECTION INTRAMUSCULAR; INTRAVENOUS ONCE
Status: CANCELLED | OUTPATIENT
Start: 2023-07-11

## 2023-07-11 RX ORDER — FAMOTIDINE 10 MG/ML
20 INJECTION, SOLUTION INTRAVENOUS ONCE
Status: CANCELLED | OUTPATIENT
Start: 2023-07-11

## 2023-07-11 RX ORDER — DIPHENHYDRAMINE HYDROCHLORIDE 50 MG/ML
25 INJECTION INTRAMUSCULAR; INTRAVENOUS ONCE
Status: COMPLETED | OUTPATIENT
Start: 2023-07-11 | End: 2023-07-11

## 2023-07-11 RX ORDER — FAMOTIDINE 10 MG/ML
20 INJECTION, SOLUTION INTRAVENOUS ONCE
Status: COMPLETED | OUTPATIENT
Start: 2023-07-11 | End: 2023-07-11

## 2023-07-11 RX ADMIN — FAMOTIDINE 20 MG: 10 INJECTION, SOLUTION INTRAVENOUS at 11:05:00

## 2023-07-11 RX ADMIN — DIPHENHYDRAMINE HYDROCHLORIDE 25 MG: 50 INJECTION INTRAMUSCULAR; INTRAVENOUS at 11:03:00

## 2023-07-11 NOTE — PROGRESS NOTES
Per Dr. Flonnie Angelucci, pt does not need her TTE scheduled at this time. Pt here for C3D1 Taxol/Trazimera. Arrives Ambulating independently, accompanied by Spouse       Oral medications included in this regimen:  no    Patient confirms comprehension of cancer treatment schedule:  yes    Pregnancy screening:  Denies possibility of pregnancy    Modifications in dose or schedule:  Not today, venofer added to next 5 txs    Medications appearance and physical integrity checked by RN. Chemotherapy IV pump settings verified by 2 RNs:  yes     Frequency of blood return and site check throughout administration: Prior to administration, Prior to each drug, and At completion of therapy     Infusion/treatment outcome:  patient tolerated treatment without incident    Education Record    Learner:  Patient  Barriers / Limitations:  None  Method:  Discussion  Education / instructions given:   Today's regime and Venofer admin with next 5 txs  Outcome:  Shows understanding    Discharged Home, Ambulating independently, accompanied by:Spouse    Patient/family verbalized understanding of future appointments: by [facility] and printed AVS

## 2023-07-11 NOTE — PROGRESS NOTES
Patient is here for C3D1 Taxol/Traximera. Patient states that she has been eating good and taking in adequate fluids. Denies N/V/D/C. Patient c/o bilateral thigh pain that just started recently. Patient also c/o right shoulder pain that just started recently. Patient has no other concerns or complaints at this time.       Education Record    Learner:  Patient and Spouse    Disease / Diagnosis: malignant neoplasm of breast    Barriers / Limitations:  None   Comments:    Method:  Discussion   Comments:    General Topics:  Medication, Pain, Side effects and symptom management, and Plan of care reviewed   Comments:    Outcome:  Shows understanding   Comments: Patient: Erika Kelly    Procedure: Procedure(s):  LEFT EYE PHACOEMULSIFICATION, CATARACT, WITH STANDARD INTRAOCULAR LENS IMPLANT INSERTION       Anesthesia Type:  MAC    Note:  Disposition: Outpatient   Postop Pain Control: Uneventful            Sign Out: Well controlled pain   PONV: No   Neuro/Psych: Uneventful            Sign Out: Acceptable/Baseline neuro status   Airway/Respiratory: Uneventful            Sign Out: Acceptable/Baseline resp. status   CV/Hemodynamics: Uneventful            Sign Out: Acceptable CV status; No obvious hypovolemia; No obvious fluid overload   Other NRE: NONE   DID A NON-ROUTINE EVENT OCCUR? No           Last vitals:  Vitals Value Taken Time   /59 02/06/23 1230   Temp 36.4  C (97.6  F) 02/06/23 1230   Pulse 62 02/06/23 1230   Resp 15 02/06/23 1230   SpO2 100 % 02/06/23 1230       Electronically Signed By: Agapito Rios MD  February 6, 2023  1:01 PM

## 2023-07-18 ENCOUNTER — OFFICE VISIT (OUTPATIENT)
Dept: HEMATOLOGY/ONCOLOGY | Facility: HOSPITAL | Age: 53
End: 2023-07-18
Attending: INTERNAL MEDICINE
Payer: COMMERCIAL

## 2023-07-18 VITALS
OXYGEN SATURATION: 99 % | BODY MASS INDEX: 30 KG/M2 | SYSTOLIC BLOOD PRESSURE: 134 MMHG | DIASTOLIC BLOOD PRESSURE: 86 MMHG | RESPIRATION RATE: 16 BRPM | WEIGHT: 186 LBS | HEART RATE: 78 BPM | TEMPERATURE: 98 F

## 2023-07-18 DIAGNOSIS — C50.112 MALIGNANT NEOPLASM OF CENTRAL PORTION OF LEFT BREAST IN FEMALE, ESTROGEN RECEPTOR NEGATIVE: ICD-10-CM

## 2023-07-18 DIAGNOSIS — D50.0 IRON DEFICIENCY ANEMIA DUE TO CHRONIC BLOOD LOSS: ICD-10-CM

## 2023-07-18 DIAGNOSIS — Z79.899 ENCOUNTER FOR MONITORING CARDIOTOXIC DRUG THERAPY: Primary | ICD-10-CM

## 2023-07-18 DIAGNOSIS — Z17.1 MALIGNANT NEOPLASM OF CENTRAL PORTION OF LEFT BREAST IN FEMALE, ESTROGEN RECEPTOR NEGATIVE: ICD-10-CM

## 2023-07-18 DIAGNOSIS — Z51.81 ENCOUNTER FOR MONITORING CARDIOTOXIC DRUG THERAPY: Primary | ICD-10-CM

## 2023-07-18 LAB
BASOPHILS # BLD AUTO: 0.03 X10(3) UL (ref 0–0.2)
BASOPHILS NFR BLD AUTO: 0.8 %
EOSINOPHIL # BLD AUTO: 0.09 X10(3) UL (ref 0–0.7)
EOSINOPHIL NFR BLD AUTO: 2.3 %
ERYTHROCYTE [DISTWIDTH] IN BLOOD BY AUTOMATED COUNT: 13.9 %
HCT VFR BLD AUTO: 33 %
HGB BLD-MCNC: 10.5 G/DL
IMM GRANULOCYTES # BLD AUTO: 0.1 X10(3) UL (ref 0–1)
IMM GRANULOCYTES NFR BLD: 2.5 %
LYMPHOCYTES # BLD AUTO: 1.6 X10(3) UL (ref 1–4)
LYMPHOCYTES NFR BLD AUTO: 40.5 %
MCH RBC QN AUTO: 29.7 PG (ref 26–34)
MCHC RBC AUTO-ENTMCNC: 31.8 G/DL (ref 31–37)
MCV RBC AUTO: 93.2 FL
MONOCYTES # BLD AUTO: 0.33 X10(3) UL (ref 0.1–1)
MONOCYTES NFR BLD AUTO: 8.4 %
NEUTROPHILS # BLD AUTO: 1.8 X10 (3) UL (ref 1.5–7.7)
NEUTROPHILS # BLD AUTO: 1.8 X10(3) UL (ref 1.5–7.7)
NEUTROPHILS NFR BLD AUTO: 45.5 %
PLATELET # BLD AUTO: 317 10(3)UL (ref 150–450)
RBC # BLD AUTO: 3.54 X10(6)UL
WBC # BLD AUTO: 4 X10(3) UL (ref 4–11)

## 2023-07-18 PROCEDURE — S0028 INJECTION, FAMOTIDINE, 20 MG: HCPCS | Performed by: NURSE PRACTITIONER

## 2023-07-18 PROCEDURE — 96413 CHEMO IV INFUSION 1 HR: CPT

## 2023-07-18 PROCEDURE — 96375 TX/PRO/DX INJ NEW DRUG ADDON: CPT

## 2023-07-18 PROCEDURE — 85025 COMPLETE CBC W/AUTO DIFF WBC: CPT

## 2023-07-18 PROCEDURE — 96417 CHEMO IV INFUS EACH ADDL SEQ: CPT

## 2023-07-18 RX ORDER — FAMOTIDINE 10 MG/ML
20 INJECTION, SOLUTION INTRAVENOUS ONCE
Status: COMPLETED | OUTPATIENT
Start: 2023-07-18 | End: 2023-07-18

## 2023-07-18 RX ORDER — DIPHENHYDRAMINE HYDROCHLORIDE 50 MG/ML
25 INJECTION INTRAMUSCULAR; INTRAVENOUS ONCE
Status: COMPLETED | OUTPATIENT
Start: 2023-07-18 | End: 2023-07-18

## 2023-07-18 RX ADMIN — DIPHENHYDRAMINE HYDROCHLORIDE 25 MG: 50 INJECTION INTRAMUSCULAR; INTRAVENOUS at 11:15:00

## 2023-07-18 RX ADMIN — FAMOTIDINE 20 MG: 10 INJECTION, SOLUTION INTRAVENOUS at 11:18:00

## 2023-07-18 NOTE — PROGRESS NOTES
Pt here for C3D8 Taxol, Trazimera, venofer . Arrives Ambulating independently, accompanied by Spouse       Oral medications included in this regimen:  no    Patient confirms comprehension of cancer treatment schedule:  yes    Pregnancy screening:  Denies possibility of pregnancy    Modifications in dose or schedule:  No    Medications appearance and physical integrity checked by RN.  Chemotherapy IV pump settings verified by 2 RNs:  yes     Frequency of blood return and site check throughout administration: Prior to administration and Prior to each drug     Infusion/treatment outcome:  patient tolerated treatment without incident    Education Record    Learner:  Patient and Spouse  Barriers / Limitations:  None  Method:  Brief focused and Discussion  Education / instructions given:  plan of care, appts  Outcome:  Shows understanding    Discharged Home, Ambulating independently, accompanied by:Self and Spouse    Patient/family verbalized understanding of future appointments: by [facility]

## 2023-07-25 ENCOUNTER — OFFICE VISIT (OUTPATIENT)
Dept: HEMATOLOGY/ONCOLOGY | Facility: HOSPITAL | Age: 53
End: 2023-07-25
Attending: INTERNAL MEDICINE
Payer: COMMERCIAL

## 2023-07-25 VITALS
HEART RATE: 89 BPM | OXYGEN SATURATION: 98 % | DIASTOLIC BLOOD PRESSURE: 79 MMHG | RESPIRATION RATE: 16 BRPM | BODY MASS INDEX: 29 KG/M2 | TEMPERATURE: 98 F | SYSTOLIC BLOOD PRESSURE: 128 MMHG | WEIGHT: 181 LBS

## 2023-07-25 DIAGNOSIS — Z17.1 MALIGNANT NEOPLASM OF CENTRAL PORTION OF LEFT BREAST IN FEMALE, ESTROGEN RECEPTOR NEGATIVE: Primary | ICD-10-CM

## 2023-07-25 DIAGNOSIS — C50.112 MALIGNANT NEOPLASM OF CENTRAL PORTION OF LEFT BREAST IN FEMALE, ESTROGEN RECEPTOR NEGATIVE: Primary | ICD-10-CM

## 2023-07-25 DIAGNOSIS — D50.0 IRON DEFICIENCY ANEMIA DUE TO CHRONIC BLOOD LOSS: ICD-10-CM

## 2023-07-25 DIAGNOSIS — Z51.81 ENCOUNTER FOR MONITORING CARDIOTOXIC DRUG THERAPY: ICD-10-CM

## 2023-07-25 DIAGNOSIS — Z79.899 ENCOUNTER FOR MONITORING CARDIOTOXIC DRUG THERAPY: ICD-10-CM

## 2023-07-25 LAB
BASOPHILS # BLD AUTO: 0.02 X10(3) UL (ref 0–0.2)
BASOPHILS NFR BLD AUTO: 0.6 %
EOSINOPHIL # BLD AUTO: 0.03 X10(3) UL (ref 0–0.7)
EOSINOPHIL NFR BLD AUTO: 0.8 %
ERYTHROCYTE [DISTWIDTH] IN BLOOD BY AUTOMATED COUNT: 14.4 %
HCT VFR BLD AUTO: 34.2 %
HGB BLD-MCNC: 10.9 G/DL
IMM GRANULOCYTES # BLD AUTO: 0.02 X10(3) UL (ref 0–1)
IMM GRANULOCYTES NFR BLD: 0.6 %
LYMPHOCYTES # BLD AUTO: 1.67 X10(3) UL (ref 1–4)
LYMPHOCYTES NFR BLD AUTO: 46 %
MCH RBC QN AUTO: 29.9 PG (ref 26–34)
MCHC RBC AUTO-ENTMCNC: 31.9 G/DL (ref 31–37)
MCV RBC AUTO: 93.7 FL
MONOCYTES # BLD AUTO: 0.28 X10(3) UL (ref 0.1–1)
MONOCYTES NFR BLD AUTO: 7.7 %
NEUTROPHILS # BLD AUTO: 1.61 X10 (3) UL (ref 1.5–7.7)
NEUTROPHILS # BLD AUTO: 1.61 X10(3) UL (ref 1.5–7.7)
NEUTROPHILS NFR BLD AUTO: 44.3 %
PLATELET # BLD AUTO: 316 10(3)UL (ref 150–450)
RBC # BLD AUTO: 3.65 X10(6)UL
WBC # BLD AUTO: 3.6 X10(3) UL (ref 4–11)

## 2023-07-25 PROCEDURE — 96375 TX/PRO/DX INJ NEW DRUG ADDON: CPT

## 2023-07-25 PROCEDURE — S0028 INJECTION, FAMOTIDINE, 20 MG: HCPCS | Performed by: NURSE PRACTITIONER

## 2023-07-25 PROCEDURE — 96417 CHEMO IV INFUS EACH ADDL SEQ: CPT

## 2023-07-25 PROCEDURE — 85025 COMPLETE CBC W/AUTO DIFF WBC: CPT

## 2023-07-25 PROCEDURE — 96413 CHEMO IV INFUSION 1 HR: CPT

## 2023-07-25 RX ORDER — FAMOTIDINE 10 MG/ML
20 INJECTION, SOLUTION INTRAVENOUS ONCE
Status: COMPLETED | OUTPATIENT
Start: 2023-07-25 | End: 2023-07-25

## 2023-07-25 RX ORDER — DIPHENHYDRAMINE HYDROCHLORIDE 50 MG/ML
25 INJECTION INTRAMUSCULAR; INTRAVENOUS ONCE
Status: COMPLETED | OUTPATIENT
Start: 2023-07-25 | End: 2023-07-25

## 2023-07-25 RX ADMIN — DIPHENHYDRAMINE HYDROCHLORIDE 25 MG: 50 INJECTION INTRAMUSCULAR; INTRAVENOUS at 11:43:00

## 2023-07-25 RX ADMIN — FAMOTIDINE 20 MG: 10 INJECTION, SOLUTION INTRAVENOUS at 11:43:00

## 2023-07-25 NOTE — PROGRESS NOTES
Pt here for C3D15 of Taxol/Trazimera . Arrives Ambulating independently, accompanied by Self and Spouse       Oral medications included in this regimen:  no    Patient confirms comprehension of cancer treatment schedule:  yes    Pregnancy screening:  Denies possibility of pregnancy    Modifications in dose or schedule:  No    Medications appearance and physical integrity checked by RN. Chemotherapy IV pump settings verified by 2 RNs:  yes     Frequency of blood return and site check throughout administration: Prior to administration     Infusion/treatment outcome:  patient tolerated treatment without incident    Education Record    Learner:  Patient and Spouse  Barriers / Limitations:  None  Method:  Brief focused  Education / instructions given:  patient/spouse   Outcome:  Shows understanding    Discharged Home, Ambulating independently, accompanied by:Self and Spouse    Patient/family verbalized understanding of future appointments: by printed AVS    Patient has tolerated her chemo well. She is c/o that after her last chemo , she had lower extremity neuropathy but its better now.  Patient aware that in case her neuropathy gets to the point that she can't walk to let Primary team know so we can address it accordingly

## 2023-08-01 ENCOUNTER — OFFICE VISIT (OUTPATIENT)
Dept: HEMATOLOGY/ONCOLOGY | Facility: HOSPITAL | Age: 53
End: 2023-08-01
Attending: INTERNAL MEDICINE
Payer: COMMERCIAL

## 2023-08-01 VITALS
HEIGHT: 66.22 IN | SYSTOLIC BLOOD PRESSURE: 142 MMHG | HEART RATE: 92 BPM | WEIGHT: 181 LBS | TEMPERATURE: 98 F | OXYGEN SATURATION: 92 % | DIASTOLIC BLOOD PRESSURE: 92 MMHG | BODY MASS INDEX: 29.09 KG/M2

## 2023-08-01 DIAGNOSIS — D50.0 IRON DEFICIENCY ANEMIA DUE TO CHRONIC BLOOD LOSS: ICD-10-CM

## 2023-08-01 DIAGNOSIS — Z17.1 MALIGNANT NEOPLASM OF CENTRAL PORTION OF LEFT BREAST IN FEMALE, ESTROGEN RECEPTOR NEGATIVE: Primary | ICD-10-CM

## 2023-08-01 DIAGNOSIS — G89.18 JOINT PAIN FOLLOWING CHEMOTHERAPY: ICD-10-CM

## 2023-08-01 DIAGNOSIS — C50.112 MALIGNANT NEOPLASM OF CENTRAL PORTION OF LEFT BREAST IN FEMALE, ESTROGEN RECEPTOR NEGATIVE: Primary | ICD-10-CM

## 2023-08-01 DIAGNOSIS — Z79.899 ENCOUNTER FOR MONITORING CARDIOTOXIC DRUG THERAPY: ICD-10-CM

## 2023-08-01 DIAGNOSIS — T45.1X5A SKIN CHANGES RELATED TO CHEMOTHERAPY: ICD-10-CM

## 2023-08-01 DIAGNOSIS — M25.50 JOINT PAIN FOLLOWING CHEMOTHERAPY: ICD-10-CM

## 2023-08-01 DIAGNOSIS — R23.9 SKIN CHANGES RELATED TO CHEMOTHERAPY: ICD-10-CM

## 2023-08-01 DIAGNOSIS — Z51.11 CHEMOTHERAPY MANAGEMENT, ENCOUNTER FOR: ICD-10-CM

## 2023-08-01 DIAGNOSIS — Z51.81 ENCOUNTER FOR MONITORING CARDIOTOXIC DRUG THERAPY: ICD-10-CM

## 2023-08-01 DIAGNOSIS — D70.9 NEUTROPENIA, UNSPECIFIED TYPE (HCC): ICD-10-CM

## 2023-08-01 LAB
ALBUMIN SERPL-MCNC: 2.9 G/DL (ref 3.4–5)
ALBUMIN/GLOB SERPL: 0.6 {RATIO} (ref 1–2)
ALP LIVER SERPL-CCNC: 75 U/L
ALT SERPL-CCNC: 27 U/L
ANION GAP SERPL CALC-SCNC: 6 MMOL/L (ref 0–18)
ANTIBODY SCREEN: NEGATIVE
AST SERPL-CCNC: 26 U/L (ref 15–37)
BASOPHILS # BLD AUTO: 0.01 X10(3) UL (ref 0–0.2)
BASOPHILS NFR BLD AUTO: 0.3 %
BILIRUB SERPL-MCNC: 0.2 MG/DL (ref 0.1–2)
BUN BLD-MCNC: 6 MG/DL (ref 7–18)
CALCIUM BLD-MCNC: 8.9 MG/DL (ref 8.5–10.1)
CHLORIDE SERPL-SCNC: 106 MMOL/L (ref 98–112)
CO2 SERPL-SCNC: 25 MMOL/L (ref 21–32)
CREAT BLD-MCNC: 0.77 MG/DL
EGFRCR SERPLBLD CKD-EPI 2021: 93 ML/MIN/1.73M2 (ref 60–?)
EOSINOPHIL # BLD AUTO: 0.04 X10(3) UL (ref 0–0.7)
EOSINOPHIL NFR BLD AUTO: 1.3 %
ERYTHROCYTE [DISTWIDTH] IN BLOOD BY AUTOMATED COUNT: 14.1 %
FASTING STATUS PATIENT QL REPORTED: NO
GLOBULIN PLAS-MCNC: 4.6 G/DL (ref 2.8–4.4)
GLUCOSE BLD-MCNC: 169 MG/DL (ref 70–99)
HCT VFR BLD AUTO: 31.5 %
HGB BLD-MCNC: 10.4 G/DL
IMM GRANULOCYTES # BLD AUTO: 0.03 X10(3) UL (ref 0–1)
IMM GRANULOCYTES NFR BLD: 0.9 %
LYMPHOCYTES # BLD AUTO: 1.16 X10(3) UL (ref 1–4)
LYMPHOCYTES NFR BLD AUTO: 36.5 %
MCH RBC QN AUTO: 30 PG (ref 26–34)
MCHC RBC AUTO-ENTMCNC: 33 G/DL (ref 31–37)
MCV RBC AUTO: 90.8 FL
MONOCYTES # BLD AUTO: 0.28 X10(3) UL (ref 0.1–1)
MONOCYTES NFR BLD AUTO: 8.8 %
NEUTROPHILS # BLD AUTO: 1.66 X10 (3) UL (ref 1.5–7.7)
NEUTROPHILS # BLD AUTO: 1.66 X10(3) UL (ref 1.5–7.7)
NEUTROPHILS NFR BLD AUTO: 52.2 %
OSMOLALITY SERPL CALC.SUM OF ELEC: 286 MOSM/KG (ref 275–295)
PLATELET # BLD AUTO: 301 10(3)UL (ref 150–450)
POTASSIUM SERPL-SCNC: 3.9 MMOL/L (ref 3.5–5.1)
PROT SERPL-MCNC: 7.5 G/DL (ref 6.4–8.2)
RBC # BLD AUTO: 3.47 X10(6)UL
RH BLOOD TYPE: POSITIVE
SODIUM SERPL-SCNC: 137 MMOL/L (ref 136–145)
WBC # BLD AUTO: 3.2 X10(3) UL (ref 4–11)

## 2023-08-01 PROCEDURE — 86900 BLOOD TYPING SEROLOGIC ABO: CPT | Performed by: INTERNAL MEDICINE

## 2023-08-01 PROCEDURE — 80053 COMPREHEN METABOLIC PANEL: CPT

## 2023-08-01 PROCEDURE — 99215 OFFICE O/P EST HI 40 MIN: CPT | Performed by: INTERNAL MEDICINE

## 2023-08-01 PROCEDURE — 85025 COMPLETE CBC W/AUTO DIFF WBC: CPT

## 2023-08-01 PROCEDURE — S0028 INJECTION, FAMOTIDINE, 20 MG: HCPCS | Performed by: INTERNAL MEDICINE

## 2023-08-01 PROCEDURE — 96417 CHEMO IV INFUS EACH ADDL SEQ: CPT

## 2023-08-01 PROCEDURE — 96375 TX/PRO/DX INJ NEW DRUG ADDON: CPT

## 2023-08-01 PROCEDURE — 86901 BLOOD TYPING SEROLOGIC RH(D): CPT | Performed by: INTERNAL MEDICINE

## 2023-08-01 PROCEDURE — 96413 CHEMO IV INFUSION 1 HR: CPT

## 2023-08-01 PROCEDURE — 86850 RBC ANTIBODY SCREEN: CPT | Performed by: INTERNAL MEDICINE

## 2023-08-01 RX ORDER — DIPHENHYDRAMINE HYDROCHLORIDE 50 MG/ML
25 INJECTION INTRAMUSCULAR; INTRAVENOUS ONCE
OUTPATIENT
Start: 2023-08-15

## 2023-08-01 RX ORDER — FAMOTIDINE 10 MG/ML
20 INJECTION, SOLUTION INTRAVENOUS ONCE
OUTPATIENT
Start: 2023-08-08

## 2023-08-01 RX ORDER — DIPHENHYDRAMINE HYDROCHLORIDE 50 MG/ML
25 INJECTION INTRAMUSCULAR; INTRAVENOUS ONCE
Status: CANCELLED | OUTPATIENT
Start: 2023-08-01

## 2023-08-01 RX ORDER — FAMOTIDINE 10 MG/ML
20 INJECTION, SOLUTION INTRAVENOUS ONCE
Status: CANCELLED | OUTPATIENT
Start: 2023-08-01

## 2023-08-01 RX ORDER — FAMOTIDINE 10 MG/ML
20 INJECTION, SOLUTION INTRAVENOUS ONCE
OUTPATIENT
Start: 2023-08-15

## 2023-08-01 RX ORDER — DIPHENHYDRAMINE HYDROCHLORIDE 50 MG/ML
25 INJECTION INTRAMUSCULAR; INTRAVENOUS ONCE
OUTPATIENT
Start: 2023-08-08

## 2023-08-01 RX ORDER — FAMOTIDINE 10 MG/ML
20 INJECTION, SOLUTION INTRAVENOUS ONCE
Status: COMPLETED | OUTPATIENT
Start: 2023-08-01 | End: 2023-08-01

## 2023-08-01 RX ORDER — DIPHENHYDRAMINE HYDROCHLORIDE 50 MG/ML
25 INJECTION INTRAMUSCULAR; INTRAVENOUS ONCE
Status: COMPLETED | OUTPATIENT
Start: 2023-08-01 | End: 2023-08-01

## 2023-08-01 RX ADMIN — FAMOTIDINE 20 MG: 10 INJECTION, SOLUTION INTRAVENOUS at 09:55:00

## 2023-08-01 RX ADMIN — DIPHENHYDRAMINE HYDROCHLORIDE 25 MG: 50 INJECTION INTRAMUSCULAR; INTRAVENOUS at 09:55:00

## 2023-08-01 NOTE — PROGRESS NOTES
Patient here for C4D1 Taxol/Trazimera. Patient c/o sinus pressure with bloody discharge from nose. Patient she has increased fatigue. Patient states that she is starting to feel tingling in her hands. Patient denies n/v/d. Patient states that she as good appetite and adequate fluid intake.      Education Record    Learner:  Patient and Spouse    Disease / Diagnosis: malignant neoplasm of breast    Barriers / Limitations:  None   Comments:    Method:  Discussion   Comments:    General Topics:  Medication, Side effects and symptom management, and Plan of care reviewed   Comments:    Outcome:  Shows understanding   Comments:

## 2023-08-01 NOTE — PROGRESS NOTES
Pt here for C4D1 Taxol/Trazimera. Arrives Ambulating independently, accompanied by Self       Patient confirms comprehension of cancer treatment schedule:  yes    Pregnancy screening:  Denies possibility of pregnancy    Modifications in dose or schedule:  No    Medications appearance and physical integrity checked by RN. Chemotherapy IV pump settings verified by 2 RNs:  yes     Frequency of blood return and site check throughout administration: Prior to administration     Infusion/treatment outcome:  patient tolerated treatment without incident    Education Record    Learner:  Patient  Barriers / Limitations:  None  Method:  Discussion  Education / instructions given:  Scheduling  Outcome:  Shows understanding    Discharged Home, Ambulating independently, accompanied by:Self    Patient/family verbalized understanding of future appointments: by printed AVS    Patient tolerated treatment without incident. Left in stable condition. Appts in place for next visits.

## 2023-08-08 ENCOUNTER — OFFICE VISIT (OUTPATIENT)
Dept: HEMATOLOGY/ONCOLOGY | Facility: HOSPITAL | Age: 53
End: 2023-08-08
Attending: INTERNAL MEDICINE
Payer: COMMERCIAL

## 2023-08-08 VITALS
HEART RATE: 80 BPM | BODY MASS INDEX: 28.93 KG/M2 | TEMPERATURE: 98 F | SYSTOLIC BLOOD PRESSURE: 109 MMHG | HEIGHT: 66.22 IN | RESPIRATION RATE: 16 BRPM | DIASTOLIC BLOOD PRESSURE: 76 MMHG | OXYGEN SATURATION: 95 % | WEIGHT: 180 LBS

## 2023-08-08 DIAGNOSIS — C50.112 MALIGNANT NEOPLASM OF CENTRAL PORTION OF LEFT BREAST IN FEMALE, ESTROGEN RECEPTOR NEGATIVE: Primary | ICD-10-CM

## 2023-08-08 DIAGNOSIS — D50.0 IRON DEFICIENCY ANEMIA DUE TO CHRONIC BLOOD LOSS: ICD-10-CM

## 2023-08-08 DIAGNOSIS — Z17.1 MALIGNANT NEOPLASM OF CENTRAL PORTION OF LEFT BREAST IN FEMALE, ESTROGEN RECEPTOR NEGATIVE: Primary | ICD-10-CM

## 2023-08-08 LAB
BASOPHILS # BLD AUTO: 0.02 X10(3) UL (ref 0–0.2)
BASOPHILS NFR BLD AUTO: 0.5 %
EOSINOPHIL # BLD AUTO: 0.03 X10(3) UL (ref 0–0.7)
EOSINOPHIL NFR BLD AUTO: 0.8 %
ERYTHROCYTE [DISTWIDTH] IN BLOOD BY AUTOMATED COUNT: 14.5 %
HCT VFR BLD AUTO: 32.5 %
HGB BLD-MCNC: 10.7 G/DL
IMM GRANULOCYTES # BLD AUTO: 0.03 X10(3) UL (ref 0–1)
IMM GRANULOCYTES NFR BLD: 0.8 %
LYMPHOCYTES # BLD AUTO: 1.43 X10(3) UL (ref 1–4)
LYMPHOCYTES NFR BLD AUTO: 38.6 %
MCH RBC QN AUTO: 29.6 PG (ref 26–34)
MCHC RBC AUTO-ENTMCNC: 32.9 G/DL (ref 31–37)
MCV RBC AUTO: 89.8 FL
MONOCYTES # BLD AUTO: 0.34 X10(3) UL (ref 0.1–1)
MONOCYTES NFR BLD AUTO: 9.2 %
NEUTROPHILS # BLD AUTO: 1.85 X10 (3) UL (ref 1.5–7.7)
NEUTROPHILS # BLD AUTO: 1.85 X10(3) UL (ref 1.5–7.7)
NEUTROPHILS NFR BLD AUTO: 50.1 %
PLATELET # BLD AUTO: 324 10(3)UL (ref 150–450)
RBC # BLD AUTO: 3.62 X10(6)UL
WBC # BLD AUTO: 3.7 X10(3) UL (ref 4–11)

## 2023-08-08 PROCEDURE — 96417 CHEMO IV INFUS EACH ADDL SEQ: CPT

## 2023-08-08 PROCEDURE — S0028 INJECTION, FAMOTIDINE, 20 MG: HCPCS | Performed by: INTERNAL MEDICINE

## 2023-08-08 PROCEDURE — 96375 TX/PRO/DX INJ NEW DRUG ADDON: CPT

## 2023-08-08 PROCEDURE — 85025 COMPLETE CBC W/AUTO DIFF WBC: CPT

## 2023-08-08 PROCEDURE — 96413 CHEMO IV INFUSION 1 HR: CPT

## 2023-08-08 RX ORDER — DIPHENHYDRAMINE HYDROCHLORIDE 50 MG/ML
25 INJECTION INTRAMUSCULAR; INTRAVENOUS ONCE
Status: COMPLETED | OUTPATIENT
Start: 2023-08-08 | End: 2023-08-08

## 2023-08-08 RX ORDER — FAMOTIDINE 10 MG/ML
20 INJECTION, SOLUTION INTRAVENOUS ONCE
Status: COMPLETED | OUTPATIENT
Start: 2023-08-08 | End: 2023-08-08

## 2023-08-08 RX ADMIN — DIPHENHYDRAMINE HYDROCHLORIDE 25 MG: 50 INJECTION INTRAMUSCULAR; INTRAVENOUS at 09:05:00

## 2023-08-08 RX ADMIN — FAMOTIDINE 20 MG: 10 INJECTION, SOLUTION INTRAVENOUS at 09:05:00

## 2023-08-08 NOTE — PROGRESS NOTES
Pt here for C4D8 taxol/trazimera. Arrives Ambulating independently, accompanied by Spouse       Oral medications included in this regimen:  no    Patient confirms comprehension of cancer treatment schedule:  yes    Pregnancy screening:  Not applicable    Modifications in dose or schedule:  No    Medications appearance and physical integrity checked by RN. Chemotherapy IV pump settings verified by 2 RNs:  yes     Frequency of blood return and site check throughout administration: Prior to administration     Infusion/treatment outcome:  patient tolerated treatment without incident    Education Record    Learner:  Patient  Barriers / Limitations:  None  Method:  Discussion  Education / instructions given:  yes  Outcome:  Shows understanding    Discharged Home, Ambulating independently, accompanied by:Self    Patient/family verbalized understanding of future appointments: by [employer] messaging    Pt here for taxol. No complaints other than some fatigue. Uses ice packs during taxol. CBC checked prior to treatment. Venofer given as well during treatment. Has appts.

## 2023-08-09 ENCOUNTER — APPOINTMENT (OUTPATIENT)
Dept: RADIATION ONCOLOGY | Facility: HOSPITAL | Age: 53
End: 2023-08-09
Attending: RADIOLOGY
Payer: COMMERCIAL

## 2023-08-15 ENCOUNTER — OFFICE VISIT (OUTPATIENT)
Dept: HEMATOLOGY/ONCOLOGY | Facility: HOSPITAL | Age: 53
End: 2023-08-15
Attending: INTERNAL MEDICINE
Payer: COMMERCIAL

## 2023-08-15 VITALS
TEMPERATURE: 98 F | BODY MASS INDEX: 29.63 KG/M2 | RESPIRATION RATE: 16 BRPM | OXYGEN SATURATION: 98 % | DIASTOLIC BLOOD PRESSURE: 91 MMHG | HEIGHT: 66.22 IN | SYSTOLIC BLOOD PRESSURE: 133 MMHG | WEIGHT: 184.38 LBS | HEART RATE: 82 BPM

## 2023-08-15 DIAGNOSIS — C50.112 MALIGNANT NEOPLASM OF CENTRAL PORTION OF LEFT BREAST IN FEMALE, ESTROGEN RECEPTOR NEGATIVE: Primary | ICD-10-CM

## 2023-08-15 DIAGNOSIS — D50.0 IRON DEFICIENCY ANEMIA DUE TO CHRONIC BLOOD LOSS: ICD-10-CM

## 2023-08-15 DIAGNOSIS — Z17.1 MALIGNANT NEOPLASM OF CENTRAL PORTION OF LEFT BREAST IN FEMALE, ESTROGEN RECEPTOR NEGATIVE: Primary | ICD-10-CM

## 2023-08-15 LAB
BASOPHILS # BLD AUTO: 0.02 X10(3) UL (ref 0–0.2)
BASOPHILS NFR BLD AUTO: 0.6 %
EOSINOPHIL # BLD AUTO: 0.04 X10(3) UL (ref 0–0.7)
EOSINOPHIL NFR BLD AUTO: 1.1 %
ERYTHROCYTE [DISTWIDTH] IN BLOOD BY AUTOMATED COUNT: 14.4 %
HCT VFR BLD AUTO: 34.1 %
HGB BLD-MCNC: 11.2 G/DL
IMM GRANULOCYTES # BLD AUTO: 0.04 X10(3) UL (ref 0–1)
IMM GRANULOCYTES NFR BLD: 1.1 %
LYMPHOCYTES # BLD AUTO: 1.29 X10(3) UL (ref 1–4)
LYMPHOCYTES NFR BLD AUTO: 36.4 %
MCH RBC QN AUTO: 30.2 PG (ref 26–34)
MCHC RBC AUTO-ENTMCNC: 32.8 G/DL (ref 31–37)
MCV RBC AUTO: 91.9 FL
MONOCYTES # BLD AUTO: 0.29 X10(3) UL (ref 0.1–1)
MONOCYTES NFR BLD AUTO: 8.2 %
NEUTROPHILS # BLD AUTO: 1.86 X10 (3) UL (ref 1.5–7.7)
NEUTROPHILS # BLD AUTO: 1.86 X10(3) UL (ref 1.5–7.7)
NEUTROPHILS NFR BLD AUTO: 52.6 %
PLATELET # BLD AUTO: 329 10(3)UL (ref 150–450)
RBC # BLD AUTO: 3.71 X10(6)UL
WBC # BLD AUTO: 3.5 X10(3) UL (ref 4–11)

## 2023-08-15 PROCEDURE — S0028 INJECTION, FAMOTIDINE, 20 MG: HCPCS | Performed by: INTERNAL MEDICINE

## 2023-08-15 PROCEDURE — 96413 CHEMO IV INFUSION 1 HR: CPT

## 2023-08-15 PROCEDURE — 85025 COMPLETE CBC W/AUTO DIFF WBC: CPT

## 2023-08-15 PROCEDURE — 96375 TX/PRO/DX INJ NEW DRUG ADDON: CPT

## 2023-08-15 PROCEDURE — 96417 CHEMO IV INFUS EACH ADDL SEQ: CPT

## 2023-08-15 RX ORDER — FAMOTIDINE 10 MG/ML
20 INJECTION, SOLUTION INTRAVENOUS ONCE
Status: COMPLETED | OUTPATIENT
Start: 2023-08-15 | End: 2023-08-15

## 2023-08-15 RX ORDER — DIPHENHYDRAMINE HYDROCHLORIDE 50 MG/ML
25 INJECTION INTRAMUSCULAR; INTRAVENOUS ONCE
Status: COMPLETED | OUTPATIENT
Start: 2023-08-15 | End: 2023-08-15

## 2023-08-15 RX ADMIN — DIPHENHYDRAMINE HYDROCHLORIDE 25 MG: 50 INJECTION INTRAMUSCULAR; INTRAVENOUS at 08:46:00

## 2023-08-15 RX ADMIN — FAMOTIDINE 20 MG: 10 INJECTION, SOLUTION INTRAVENOUS at 08:48:00

## 2023-08-15 NOTE — PROGRESS NOTES
Pt here for C4D15 Taxol/herceptin and venofer. Arrives Ambulating independently, accompanied by Self       Oral medications included in this regimen:  no    Patient confirms comprehension of cancer treatment schedule:  yes    Pregnancy screening:  Denies possibility of pregnancy    Modifications in dose or schedule:  No    Medications appearance and physical integrity checked by RN.  Chemotherapy IV pump settings verified by 2 RNs:  yes     Frequency of blood return and site check throughout administration: Prior to administration and At completion of therapy     Infusion/treatment outcome:  patient tolerated treatment without incident    Education Record    Learner:  Patient  Barriers / Limitations:  None  Method:  Brief focused  Education / instructions given:  Schedule reviewed  Outcome:  Shows understanding    Discharged Home, Ambulating independently, accompanied by:Self    Patient/family verbalized understanding of future appointments: by [facility]

## 2023-08-21 ENCOUNTER — HOSPITAL ENCOUNTER (OUTPATIENT)
Dept: RADIATION ONCOLOGY | Facility: HOSPITAL | Age: 53
Discharge: HOME | End: 2023-08-21
Attending: RADIOLOGY
Payer: COMMERCIAL

## 2023-08-21 VITALS
DIASTOLIC BLOOD PRESSURE: 95 MMHG | OXYGEN SATURATION: 98 % | TEMPERATURE: 98 F | SYSTOLIC BLOOD PRESSURE: 146 MMHG | RESPIRATION RATE: 18 BRPM | BODY MASS INDEX: 29 KG/M2 | WEIGHT: 181.81 LBS | HEART RATE: 76 BPM

## 2023-08-21 DIAGNOSIS — Z17.1 MALIGNANT NEOPLASM OF CENTRAL PORTION OF LEFT BREAST IN FEMALE, ESTROGEN RECEPTOR NEGATIVE: Primary | ICD-10-CM

## 2023-08-21 DIAGNOSIS — C50.112 MALIGNANT NEOPLASM OF CENTRAL PORTION OF LEFT BREAST IN FEMALE, ESTROGEN RECEPTOR NEGATIVE: Primary | ICD-10-CM

## 2023-08-21 PROCEDURE — 99214 OFFICE O/P EST MOD 30 MIN: CPT

## 2023-08-21 NOTE — PATIENT INSTRUCTIONS
- WE WILL CALL TO SCHEDULE YOUR CT SIMULATION FOR RADIATION PLANNING.       - IF YOU HAVE ANY QUESTIONS OR CONCERNS REGARDING RADIATION THERAPY, PLEASE CALL [phone].

## 2023-08-21 NOTE — CONSULTS
[facility] RADIATION ONCOLOGY CONSULTATION     PATIENT:   Blessing Espinal MD:  MD Linda Poole MD      DIAGNOSIS:   pT1b N0 M0 (ER neg KS neg H2+) left retroareolar IDC        CC:    Discuss adjuvant breast radiation therapy    HPI   59-year-old here with her . Presented with bloody nipple discharge. She underwent imaging in November 2022 with abnormal findings in the left breast retroareolar region. Biopsy was somewhat delayed due to travel to [place] for family reasons. Repeat ultrasound 3/24/2023 along with ultrasound-guided biopsy showed at least 2 sites of focal soft tissue within dilated ducts in the retroareolar left breast.  Located at 11 and 12:00. Measuring 4 and 5 mm. Pathology shows high nuclear grade DCIS with multiple foci of at least microinvasion. Largest focus of DCIS on biopsy was 16 mm. Breast MRI 4/6/2023 shows enhancement, left retroareolar breast, spanning 19 x 19 x 51 mm, some of which was felt to be post-biopsy changes. Incidental finding of enhancement in the retroareolar right breast measuring 7 x 8 x 5 mm. Prominent, but not pathologically enlarged bilateral axillary nodes. MR guided biopsy of enhancement in the right retroareolar breast on 4/14/2023 showed a fibroadenoma. Patient opted for a central lumpectomy, which was done with shave margins and a sentinel lymph node biopsy on 5/5/2023. Pathology shows 3 negative sentinel lymph nodes. The lumpectomy specimen shows 6 mm grade 3 IDC with prominent lymphocytic response. There is a 2.5 cm span of nuclear grade 3 DCIS. No lymphovascular invasion. The superior shave margin shows a less than 1 mm focus of microinvasive carcinoma, 4 mm from the final superior margin. There is 9 mm span of DCIS, less than 1 mm from the final superior margin over a span of 2.5 mm. Additional margins are negative.   The invasive tumor is ER negative, KS negative, Ki-67 80-90%, HER2 2+, amplified by FISH with ratio 6.0. Case was reviewed in breast cancer conference. No reexcision was pursued. Adjuvant Taxol and Herceptin was given from  and completed 1 week ago. She will continue Herceptin. She had mild peripheral neuropathy and a facial rash from Taxol. PMH  -HTN, HL    PSH  -C section x 2    Current Outpatient Medications   Medication Instructions    amoxicillin clavulanate 875-125 MG Oral Tab 1 tablet, Every 12 Hours    atenolol (TENORMIN) 50 mg, Oral, Daily    atorvastatin (LIPITOR) 20 mg, Oral, Daily    BENDROFLUMETHIAZIDE OR 2.5 mg, Daily    Ferrous Sulfate (IRON) 325 (65 Fe) MG Oral Tab Oral, Daily    hydrocortisone 2.5 % External Cream 1 Application, Topical, 2 times daily, For facial rash    lidocaine-prilocaine 2.5-2.5 % External Cream Apply to site 1 hour prior to port a cath needle insertion    Multiple Vitamins-Minerals (CENTRUM ADULTS OR) Oral, Daily    ondansetron (ZOFRAN) 8 mg, Oral, Every 8 hours PRN    prochlorperazine (COMPAZINE) 10 mg, Oral, Every 6 hours PRN     No Known Allergies    SH  -, 2 children, never smoker, on med leave from work    FH  -no breast/ovarian CA; genetic testing negative    ROS  -fatigue, PN, facial rash, occ HA    PHYSICAL EXAM   ECO  GEN:  Well appearing. No acute distress. HEENT:  No supraclavicular adenopathy. CHEST:  Regular rate and rhythm. Unremarkable post central lumpectomy appearance of left breast. Incision CDI. L axilla incision CDI. ABDOMEN: Soft, non-tender. EXT:  No edema. Good ROM. No axillary adenopathy. NEURO:  Alert, oriented. Cranial nerves grossly intact.      IMPRESSION:   45 yo s/p lumpectomy with shave margins and sentinel lymph node biopsy for left retroareolar breast cancer    -6 mm grade 3 IDC, ER negative NM negative Ki-67 up to 90% HER2 2+ by IHC and amplified by FISH, with over 2.5 cm of associated high-grade DCIS  -DCIS is less than 1 mm from the superior shave margin; invasive disease has a wider margin  -All 3 sentinel lymph nodes are negative for carcinoma  -She has completed adjuvant Taxol and Herceptin chemotherapy and will continue Herceptin  -Recommended adjuvant radiation therapy to the whole breast for local control, followed by a boost given her pathologic features (age, tumor grade, ER status, less than 1 mm margin from DCIS)  -Discussed treatment in prone position for the whole breast portion, and supine position for the boost with image guidance  -Discussed short-term side effects, along with long-term effects to the breast tissue  -There should be minimal dose to the heart and lung  -She will need close mammographic follow-up given the numerous adverse pathologic features    PLAN:   -CT simulation  -40 Gy to whole left breast over 15 fractions (prone)  -10 Gy boost to the tumor bed (supine)  -continue Herceptin    Chepe Dolan MD  Radiation Oncology    CC: MD SOFIA Pate MD

## 2023-08-22 ENCOUNTER — APPOINTMENT (OUTPATIENT)
Dept: HEMATOLOGY/ONCOLOGY | Facility: HOSPITAL | Age: 53
End: 2023-08-22
Attending: INTERNAL MEDICINE
Payer: COMMERCIAL

## 2023-08-22 VITALS
HEART RATE: 101 BPM | WEIGHT: 182.81 LBS | RESPIRATION RATE: 18 BRPM | SYSTOLIC BLOOD PRESSURE: 168 MMHG | HEIGHT: 66.22 IN | OXYGEN SATURATION: 99 % | BODY MASS INDEX: 29.38 KG/M2 | DIASTOLIC BLOOD PRESSURE: 84 MMHG | TEMPERATURE: 98 F

## 2023-08-22 DIAGNOSIS — M25.50 JOINT PAIN FOLLOWING CHEMOTHERAPY: ICD-10-CM

## 2023-08-22 DIAGNOSIS — C50.112 MALIGNANT NEOPLASM OF CENTRAL PORTION OF LEFT BREAST IN FEMALE, ESTROGEN RECEPTOR NEGATIVE: Primary | ICD-10-CM

## 2023-08-22 DIAGNOSIS — D50.0 IRON DEFICIENCY ANEMIA DUE TO CHRONIC BLOOD LOSS: ICD-10-CM

## 2023-08-22 DIAGNOSIS — Z51.81 ENCOUNTER FOR MONITORING CARDIOTOXIC DRUG THERAPY: Primary | ICD-10-CM

## 2023-08-22 DIAGNOSIS — E87.6 HYPOKALEMIA: ICD-10-CM

## 2023-08-22 DIAGNOSIS — Z17.1 MALIGNANT NEOPLASM OF CENTRAL PORTION OF LEFT BREAST IN FEMALE, ESTROGEN RECEPTOR NEGATIVE: ICD-10-CM

## 2023-08-22 DIAGNOSIS — Z51.11 CHEMOTHERAPY MANAGEMENT, ENCOUNTER FOR: ICD-10-CM

## 2023-08-22 DIAGNOSIS — Z79.899 ENCOUNTER FOR MONITORING CARDIOTOXIC DRUG THERAPY: Primary | ICD-10-CM

## 2023-08-22 DIAGNOSIS — C50.112 MALIGNANT NEOPLASM OF CENTRAL PORTION OF LEFT BREAST IN FEMALE, ESTROGEN RECEPTOR NEGATIVE: ICD-10-CM

## 2023-08-22 DIAGNOSIS — G89.18 JOINT PAIN FOLLOWING CHEMOTHERAPY: ICD-10-CM

## 2023-08-22 DIAGNOSIS — Z51.81 ENCOUNTER FOR MONITORING CARDIOTOXIC DRUG THERAPY: ICD-10-CM

## 2023-08-22 DIAGNOSIS — D64.81 ANTINEOPLASTIC CHEMOTHERAPY INDUCED ANEMIA: ICD-10-CM

## 2023-08-22 DIAGNOSIS — D70.9 NEUTROPENIA, UNSPECIFIED TYPE (HCC): ICD-10-CM

## 2023-08-22 DIAGNOSIS — Z79.899 ENCOUNTER FOR MONITORING CARDIOTOXIC DRUG THERAPY: ICD-10-CM

## 2023-08-22 DIAGNOSIS — T45.1X5A ANTINEOPLASTIC CHEMOTHERAPY INDUCED ANEMIA: ICD-10-CM

## 2023-08-22 DIAGNOSIS — Z17.1 MALIGNANT NEOPLASM OF CENTRAL PORTION OF LEFT BREAST IN FEMALE, ESTROGEN RECEPTOR NEGATIVE: Primary | ICD-10-CM

## 2023-08-22 DIAGNOSIS — Z98.890 S/P LUMPECTOMY, LEFT BREAST: ICD-10-CM

## 2023-08-22 LAB
ALBUMIN SERPL-MCNC: 3.1 G/DL (ref 3.4–5)
ALBUMIN/GLOB SERPL: 0.8 {RATIO} (ref 1–2)
ALP LIVER SERPL-CCNC: 62 U/L
ALT SERPL-CCNC: 35 U/L
ANION GAP SERPL CALC-SCNC: 6 MMOL/L (ref 0–18)
AST SERPL-CCNC: 25 U/L (ref 15–37)
BASOPHILS # BLD AUTO: 0.02 X10(3) UL (ref 0–0.2)
BASOPHILS NFR BLD AUTO: 0.5 %
BILIRUB SERPL-MCNC: 0.3 MG/DL (ref 0.1–2)
BUN BLD-MCNC: 6 MG/DL (ref 7–18)
CALCIUM BLD-MCNC: 9.4 MG/DL (ref 8.5–10.1)
CHLORIDE SERPL-SCNC: 109 MMOL/L (ref 98–112)
CO2 SERPL-SCNC: 26 MMOL/L (ref 21–32)
CREAT BLD-MCNC: 0.68 MG/DL
EGFRCR SERPLBLD CKD-EPI 2021: 105 ML/MIN/1.73M2 (ref 60–?)
EOSINOPHIL # BLD AUTO: 0.07 X10(3) UL (ref 0–0.7)
EOSINOPHIL NFR BLD AUTO: 1.8 %
ERYTHROCYTE [DISTWIDTH] IN BLOOD BY AUTOMATED COUNT: 14.7 %
FASTING STATUS PATIENT QL REPORTED: NO
GLOBULIN PLAS-MCNC: 4 G/DL (ref 2.8–4.4)
GLUCOSE BLD-MCNC: 119 MG/DL (ref 70–99)
HCT VFR BLD AUTO: 32.3 %
HGB BLD-MCNC: 10.9 G/DL
IMM GRANULOCYTES # BLD AUTO: 0.04 X10(3) UL (ref 0–1)
IMM GRANULOCYTES NFR BLD: 1 %
LYMPHOCYTES # BLD AUTO: 1.54 X10(3) UL (ref 1–4)
LYMPHOCYTES NFR BLD AUTO: 40.4 %
MCH RBC QN AUTO: 30.2 PG (ref 26–34)
MCHC RBC AUTO-ENTMCNC: 33.7 G/DL (ref 31–37)
MCV RBC AUTO: 89.5 FL
MONOCYTES # BLD AUTO: 0.33 X10(3) UL (ref 0.1–1)
MONOCYTES NFR BLD AUTO: 8.7 %
NEUTROPHILS # BLD AUTO: 1.81 X10 (3) UL (ref 1.5–7.7)
NEUTROPHILS # BLD AUTO: 1.81 X10(3) UL (ref 1.5–7.7)
NEUTROPHILS NFR BLD AUTO: 47.6 %
OSMOLALITY SERPL CALC.SUM OF ELEC: 291 MOSM/KG (ref 275–295)
PLATELET # BLD AUTO: 288 10(3)UL (ref 150–450)
POTASSIUM SERPL-SCNC: 3.4 MMOL/L (ref 3.5–5.1)
PROT SERPL-MCNC: 7.1 G/DL (ref 6.4–8.2)
RBC # BLD AUTO: 3.61 X10(6)UL
SODIUM SERPL-SCNC: 141 MMOL/L (ref 136–145)
WBC # BLD AUTO: 3.8 X10(3) UL (ref 4–11)

## 2023-08-22 PROCEDURE — 85025 COMPLETE CBC W/AUTO DIFF WBC: CPT

## 2023-08-22 PROCEDURE — 80053 COMPREHEN METABOLIC PANEL: CPT

## 2023-08-22 PROCEDURE — 96413 CHEMO IV INFUSION 1 HR: CPT

## 2023-08-22 PROCEDURE — 99215 OFFICE O/P EST HI 40 MIN: CPT | Performed by: INTERNAL MEDICINE

## 2023-08-22 RX ORDER — POTASSIUM CHLORIDE 750 MG/1
10 TABLET, FILM COATED, EXTENDED RELEASE ORAL 2 TIMES DAILY
Qty: 60 TABLET | Refills: 0 | Status: SHIPPED | OUTPATIENT
Start: 2023-08-22

## 2023-08-22 NOTE — PROGRESS NOTES
Patient is here for MD f/u and cycle 5 of Trastuzumab. Patient c/o sinus congestion. Occasional blood tinged mucous when blowing her nose. No fevers but feels cold on occasion. Appetite is good. Energy level is good. Denies cough or SOB. Patient c/o leg fatigue. Patient had rad/onc consult yesterday.      Education Record    Learner:  Patient and Spouse    Disease / Diagnosis:  Breast cancer     Barriers / Limitations:  None   Comments:    Method:  Discussion   Comments:    General Topics:  Plan of care reviewed   Comments:    Outcome:  Shows understanding   Comments:

## 2023-08-22 NOTE — PROGRESS NOTES
Pt here for C5D1 Herceptin. Arrives Ambulating independently, accompanied by Spouse       Oral medications included in this regimen:  no    Patient confirms comprehension of cancer treatment schedule:  yes    Pregnancy screening:  Not applicable    Modifications in dose or schedule:  No    Medications appearance and physical integrity checked by RN. Frequency of blood return and site check throughout administration: Prior to administration and At completion of therapy     Infusion/treatment outcome:  patient tolerated treatment without incident    Education Record    Learner:  Patient and Spouse  Barriers / Limitations:  None  Method:  Brief focused  Education / instructions given:   Instructed to start oral potassium, rx sent to pharmacy and reminded her to schedule echo  Outcome:  Shows understanding    Discharged Home, Ambulating independently, accompanied by:Spouse    Patient/family verbalized understanding of future appointments: by printed AVS

## 2023-08-23 ENCOUNTER — HOSPITAL ENCOUNTER (OUTPATIENT)
Dept: RADIATION ONCOLOGY | Facility: HOSPITAL | Age: 53
Discharge: HOME | End: 2023-08-23
Attending: RADIOLOGY
Payer: COMMERCIAL

## 2023-08-23 PROCEDURE — 77333 RADIATION TREATMENT AID(S): CPT | Performed by: RADIOLOGY

## 2023-08-23 PROCEDURE — 77290 THER RAD SIMULAJ FIELD CPLX: CPT | Performed by: RADIOLOGY

## 2023-09-06 ENCOUNTER — HOSPITAL ENCOUNTER (OUTPATIENT)
Dept: RADIATION ONCOLOGY | Facility: HOSPITAL | Age: 53
Discharge: HOME | End: 2023-09-06
Attending: RADIOLOGY
Payer: COMMERCIAL

## 2023-09-06 PROCEDURE — 77412 RADIATION TX DELIVERY LVL 3: CPT | Performed by: RADIOLOGY

## 2023-09-06 PROCEDURE — 77280 THER RAD SIMULAJ FIELD SMPL: CPT | Performed by: RADIOLOGY

## 2023-09-07 ENCOUNTER — HOSPITAL ENCOUNTER (OUTPATIENT)
Dept: RADIATION ONCOLOGY | Facility: HOSPITAL | Age: 53
Discharge: HOME | End: 2023-09-07
Attending: RADIOLOGY
Payer: COMMERCIAL

## 2023-09-07 VITALS
HEART RATE: 93 BPM | RESPIRATION RATE: 18 BRPM | OXYGEN SATURATION: 97 % | TEMPERATURE: 98 F | SYSTOLIC BLOOD PRESSURE: 140 MMHG | DIASTOLIC BLOOD PRESSURE: 107 MMHG

## 2023-09-07 DIAGNOSIS — C50.112 MALIGNANT NEOPLASM OF CENTRAL PORTION OF LEFT BREAST IN FEMALE, ESTROGEN RECEPTOR NEGATIVE: Primary | ICD-10-CM

## 2023-09-07 DIAGNOSIS — Z17.1 MALIGNANT NEOPLASM OF CENTRAL PORTION OF LEFT BREAST IN FEMALE, ESTROGEN RECEPTOR NEGATIVE: Primary | ICD-10-CM

## 2023-09-07 PROCEDURE — 77290 THER RAD SIMULAJ FIELD CPLX: CPT | Performed by: RADIOLOGY

## 2023-09-07 PROCEDURE — 77412 RADIATION TX DELIVERY LVL 3: CPT | Performed by: RADIOLOGY

## 2023-09-08 PROCEDURE — 77387 GUIDANCE FOR RADJ TX DLVR: CPT | Performed by: RADIOLOGY

## 2023-09-08 PROCEDURE — 77412 RADIATION TX DELIVERY LVL 3: CPT | Performed by: RADIOLOGY

## 2023-09-11 ENCOUNTER — SOCIAL WORK SERVICES (OUTPATIENT)
Dept: HEMATOLOGY/ONCOLOGY | Facility: HOSPITAL | Age: 53
End: 2023-09-11

## 2023-09-11 PROCEDURE — 77412 RADIATION TX DELIVERY LVL 3: CPT | Performed by: RADIOLOGY

## 2023-09-11 PROCEDURE — 77387 GUIDANCE FOR RADJ TX DLVR: CPT | Performed by: RADIOLOGY

## 2023-09-11 NOTE — PROGRESS NOTES
spoke with patient who inquired about financial resources; educated on [employer] and other foundations, sending resources to patient in [employer]. Will remain available to assist as needed.

## 2023-09-12 ENCOUNTER — OFFICE VISIT (OUTPATIENT)
Dept: HEMATOLOGY/ONCOLOGY | Facility: HOSPITAL | Age: 53
End: 2023-09-12
Attending: INTERNAL MEDICINE
Payer: COMMERCIAL

## 2023-09-12 VITALS
SYSTOLIC BLOOD PRESSURE: 153 MMHG | HEIGHT: 66.22 IN | DIASTOLIC BLOOD PRESSURE: 93 MMHG | OXYGEN SATURATION: 99 % | RESPIRATION RATE: 18 BRPM | TEMPERATURE: 98 F | BODY MASS INDEX: 29.6 KG/M2 | HEART RATE: 88 BPM | WEIGHT: 184.19 LBS

## 2023-09-12 DIAGNOSIS — E87.6 HYPOKALEMIA: ICD-10-CM

## 2023-09-12 DIAGNOSIS — D50.0 IRON DEFICIENCY ANEMIA DUE TO CHRONIC BLOOD LOSS: ICD-10-CM

## 2023-09-12 DIAGNOSIS — Z79.899 ENCOUNTER FOR MONITORING CARDIOTOXIC DRUG THERAPY: Primary | ICD-10-CM

## 2023-09-12 DIAGNOSIS — Z17.1 MALIGNANT NEOPLASM OF CENTRAL PORTION OF LEFT BREAST IN FEMALE, ESTROGEN RECEPTOR NEGATIVE: Primary | ICD-10-CM

## 2023-09-12 DIAGNOSIS — Z17.1 MALIGNANT NEOPLASM OF CENTRAL PORTION OF LEFT BREAST IN FEMALE, ESTROGEN RECEPTOR NEGATIVE: ICD-10-CM

## 2023-09-12 DIAGNOSIS — C50.112 MALIGNANT NEOPLASM OF CENTRAL PORTION OF LEFT BREAST IN FEMALE, ESTROGEN RECEPTOR NEGATIVE: Primary | ICD-10-CM

## 2023-09-12 DIAGNOSIS — Z51.81 ENCOUNTER FOR MONITORING CARDIOTOXIC DRUG THERAPY: Primary | ICD-10-CM

## 2023-09-12 DIAGNOSIS — C50.112 MALIGNANT NEOPLASM OF CENTRAL PORTION OF LEFT BREAST IN FEMALE, ESTROGEN RECEPTOR NEGATIVE: ICD-10-CM

## 2023-09-12 LAB
ALBUMIN SERPL-MCNC: 3.1 G/DL (ref 3.4–5)
ALBUMIN/GLOB SERPL: 0.6 {RATIO} (ref 1–2)
ALP LIVER SERPL-CCNC: 64 U/L
ALT SERPL-CCNC: 39 U/L
ANION GAP SERPL CALC-SCNC: 4 MMOL/L (ref 0–18)
AST SERPL-CCNC: 23 U/L (ref 15–37)
BASOPHILS # BLD AUTO: 0.02 X10(3) UL (ref 0–0.2)
BASOPHILS NFR BLD AUTO: 0.6 %
BILIRUB SERPL-MCNC: 0.2 MG/DL (ref 0.1–2)
BUN BLD-MCNC: 7 MG/DL (ref 7–18)
CALCIUM BLD-MCNC: 9.2 MG/DL (ref 8.5–10.1)
CHLORIDE SERPL-SCNC: 108 MMOL/L (ref 98–112)
CO2 SERPL-SCNC: 29 MMOL/L (ref 21–32)
CREAT BLD-MCNC: 0.66 MG/DL
EGFRCR SERPLBLD CKD-EPI 2021: 105 ML/MIN/1.73M2 (ref 60–?)
EOSINOPHIL # BLD AUTO: 0.1 X10(3) UL (ref 0–0.7)
EOSINOPHIL NFR BLD AUTO: 2.9 %
ERYTHROCYTE [DISTWIDTH] IN BLOOD BY AUTOMATED COUNT: 14 %
FASTING STATUS PATIENT QL REPORTED: NO
GLOBULIN PLAS-MCNC: 4.8 G/DL (ref 2.8–4.4)
GLUCOSE BLD-MCNC: 142 MG/DL (ref 70–99)
HCT VFR BLD AUTO: 33.9 %
HGB BLD-MCNC: 11.4 G/DL
IMM GRANULOCYTES # BLD AUTO: 0 X10(3) UL (ref 0–1)
IMM GRANULOCYTES NFR BLD: 0 %
LYMPHOCYTES # BLD AUTO: 1.27 X10(3) UL (ref 1–4)
LYMPHOCYTES NFR BLD AUTO: 36.8 %
MCH RBC QN AUTO: 29.8 PG (ref 26–34)
MCHC RBC AUTO-ENTMCNC: 33.6 G/DL (ref 31–37)
MCV RBC AUTO: 88.5 FL
MONOCYTES # BLD AUTO: 0.34 X10(3) UL (ref 0.1–1)
MONOCYTES NFR BLD AUTO: 9.9 %
NEUTROPHILS # BLD AUTO: 1.72 X10 (3) UL (ref 1.5–7.7)
NEUTROPHILS # BLD AUTO: 1.72 X10(3) UL (ref 1.5–7.7)
NEUTROPHILS NFR BLD AUTO: 49.8 %
OSMOLALITY SERPL CALC.SUM OF ELEC: 292 MOSM/KG (ref 275–295)
PLATELET # BLD AUTO: 212 10(3)UL (ref 150–450)
POTASSIUM SERPL-SCNC: 3.6 MMOL/L (ref 3.5–5.1)
PROT SERPL-MCNC: 7.9 G/DL (ref 6.4–8.2)
RBC # BLD AUTO: 3.83 X10(6)UL
SODIUM SERPL-SCNC: 141 MMOL/L (ref 136–145)
WBC # BLD AUTO: 3.5 X10(3) UL (ref 4–11)

## 2023-09-12 PROCEDURE — 80053 COMPREHEN METABOLIC PANEL: CPT

## 2023-09-12 PROCEDURE — 77412 RADIATION TX DELIVERY LVL 3: CPT | Performed by: RADIOLOGY

## 2023-09-12 PROCEDURE — 85025 COMPLETE CBC W/AUTO DIFF WBC: CPT

## 2023-09-12 PROCEDURE — 96413 CHEMO IV INFUSION 1 HR: CPT

## 2023-09-12 PROCEDURE — 99215 OFFICE O/P EST HI 40 MIN: CPT | Performed by: NURSE PRACTITIONER

## 2023-09-12 PROCEDURE — 77387 GUIDANCE FOR RADJ TX DLVR: CPT | Performed by: RADIOLOGY

## 2023-09-13 ENCOUNTER — SOCIAL WORK SERVICES (OUTPATIENT)
Dept: HEMATOLOGY/ONCOLOGY | Facility: HOSPITAL | Age: 53
End: 2023-09-13

## 2023-09-13 PROCEDURE — 77387 GUIDANCE FOR RADJ TX DLVR: CPT | Performed by: RADIOLOGY

## 2023-09-13 PROCEDURE — 77412 RADIATION TX DELIVERY LVL 3: CPT | Performed by: RADIOLOGY

## 2023-09-14 ENCOUNTER — HOSPITAL ENCOUNTER (OUTPATIENT)
Dept: RADIATION ONCOLOGY | Facility: HOSPITAL | Age: 53
Discharge: HOME | End: 2023-09-14
Attending: RADIOLOGY
Payer: COMMERCIAL

## 2023-09-14 VITALS
DIASTOLIC BLOOD PRESSURE: 89 MMHG | SYSTOLIC BLOOD PRESSURE: 146 MMHG | RESPIRATION RATE: 18 BRPM | TEMPERATURE: 98 F | HEART RATE: 59 BPM | OXYGEN SATURATION: 98 %

## 2023-09-14 DIAGNOSIS — C50.112 MALIGNANT NEOPLASM OF CENTRAL PORTION OF LEFT BREAST IN FEMALE, ESTROGEN RECEPTOR NEGATIVE: Primary | ICD-10-CM

## 2023-09-14 DIAGNOSIS — Z17.1 MALIGNANT NEOPLASM OF CENTRAL PORTION OF LEFT BREAST IN FEMALE, ESTROGEN RECEPTOR NEGATIVE: Primary | ICD-10-CM

## 2023-09-14 PROCEDURE — 77412 RADIATION TX DELIVERY LVL 3: CPT | Performed by: RADIOLOGY

## 2023-09-14 PROCEDURE — 77387 GUIDANCE FOR RADJ TX DLVR: CPT | Performed by: RADIOLOGY

## 2023-09-15 ENCOUNTER — HOSPITAL ENCOUNTER (OUTPATIENT)
Dept: RADIATION ONCOLOGY | Facility: HOSPITAL | Age: 53
Discharge: HOME | End: 2023-09-15
Attending: RADIOLOGY
Payer: COMMERCIAL

## 2023-09-15 PROCEDURE — 77290 THER RAD SIMULAJ FIELD CPLX: CPT | Performed by: RADIOLOGY

## 2023-09-15 PROCEDURE — 77333 RADIATION TREATMENT AID(S): CPT | Performed by: RADIOLOGY

## 2023-09-15 PROCEDURE — 77336 RADIATION PHYSICS CONSULT: CPT | Performed by: RADIOLOGY

## 2023-09-15 PROCEDURE — 77412 RADIATION TX DELIVERY LVL 3: CPT | Performed by: RADIOLOGY

## 2023-09-18 PROCEDURE — 77290 THER RAD SIMULAJ FIELD CPLX: CPT | Performed by: RADIOLOGY

## 2023-09-18 PROCEDURE — 77307 TELETHX ISODOSE PLAN CPLX: CPT | Performed by: RADIOLOGY

## 2023-09-18 PROCEDURE — 77412 RADIATION TX DELIVERY LVL 3: CPT | Performed by: RADIOLOGY

## 2023-09-18 PROCEDURE — 77334 RADIATION TREATMENT AID(S): CPT | Performed by: RADIOLOGY

## 2023-09-19 PROCEDURE — 77412 RADIATION TX DELIVERY LVL 3: CPT | Performed by: RADIOLOGY

## 2023-09-19 PROCEDURE — 77387 GUIDANCE FOR RADJ TX DLVR: CPT | Performed by: RADIOLOGY

## 2023-09-20 PROCEDURE — 77387 GUIDANCE FOR RADJ TX DLVR: CPT | Performed by: RADIOLOGY

## 2023-09-20 PROCEDURE — 77412 RADIATION TX DELIVERY LVL 3: CPT | Performed by: RADIOLOGY

## 2023-09-21 ENCOUNTER — HOSPITAL ENCOUNTER (OUTPATIENT)
Dept: RADIATION ONCOLOGY | Facility: HOSPITAL | Age: 53
Discharge: HOME | End: 2023-09-21
Attending: RADIOLOGY
Payer: COMMERCIAL

## 2023-09-21 VITALS
DIASTOLIC BLOOD PRESSURE: 88 MMHG | RESPIRATION RATE: 18 BRPM | SYSTOLIC BLOOD PRESSURE: 140 MMHG | HEART RATE: 60 BPM | TEMPERATURE: 98 F | OXYGEN SATURATION: 100 %

## 2023-09-21 DIAGNOSIS — C50.112 MALIGNANT NEOPLASM OF CENTRAL PORTION OF LEFT BREAST IN FEMALE, ESTROGEN RECEPTOR NEGATIVE: Primary | ICD-10-CM

## 2023-09-21 DIAGNOSIS — Z17.1 MALIGNANT NEOPLASM OF CENTRAL PORTION OF LEFT BREAST IN FEMALE, ESTROGEN RECEPTOR NEGATIVE: Primary | ICD-10-CM

## 2023-09-21 PROCEDURE — 77387 GUIDANCE FOR RADJ TX DLVR: CPT | Performed by: RADIOLOGY

## 2023-09-21 PROCEDURE — 77412 RADIATION TX DELIVERY LVL 3: CPT | Performed by: RADIOLOGY

## 2023-09-22 PROCEDURE — 77336 RADIATION PHYSICS CONSULT: CPT | Performed by: RADIOLOGY

## 2023-09-22 PROCEDURE — 77412 RADIATION TX DELIVERY LVL 3: CPT | Performed by: RADIOLOGY

## 2023-09-22 PROCEDURE — 77387 GUIDANCE FOR RADJ TX DLVR: CPT | Performed by: RADIOLOGY

## 2023-09-25 ENCOUNTER — HOSPITAL ENCOUNTER (OUTPATIENT)
Dept: CV DIAGNOSTICS | Facility: HOSPITAL | Age: 53
Discharge: HOME | End: 2023-09-25
Attending: INTERNAL MEDICINE
Payer: COMMERCIAL

## 2023-09-25 DIAGNOSIS — Z79.899 ENCOUNTER FOR MONITORING CARDIOTOXIC DRUG THERAPY: ICD-10-CM

## 2023-09-25 DIAGNOSIS — Z17.1 MALIGNANT NEOPLASM OF CENTRAL PORTION OF LEFT BREAST IN FEMALE, ESTROGEN RECEPTOR NEGATIVE: ICD-10-CM

## 2023-09-25 DIAGNOSIS — C50.112 MALIGNANT NEOPLASM OF CENTRAL PORTION OF LEFT BREAST IN FEMALE, ESTROGEN RECEPTOR NEGATIVE: ICD-10-CM

## 2023-09-25 DIAGNOSIS — Z51.81 ENCOUNTER FOR MONITORING CARDIOTOXIC DRUG THERAPY: ICD-10-CM

## 2023-09-25 PROCEDURE — 93306 TTE W/DOPPLER COMPLETE: CPT | Performed by: INTERNAL MEDICINE

## 2023-09-25 PROCEDURE — 77412 RADIATION TX DELIVERY LVL 3: CPT | Performed by: RADIOLOGY

## 2023-09-25 PROCEDURE — 77387 GUIDANCE FOR RADJ TX DLVR: CPT | Performed by: RADIOLOGY

## 2023-09-26 PROCEDURE — 77387 GUIDANCE FOR RADJ TX DLVR: CPT | Performed by: RADIOLOGY

## 2023-09-26 PROCEDURE — 77412 RADIATION TX DELIVERY LVL 3: CPT | Performed by: RADIOLOGY

## 2023-09-27 ENCOUNTER — HOSPITAL ENCOUNTER (OUTPATIENT)
Dept: RADIATION ONCOLOGY | Facility: HOSPITAL | Age: 53
Discharge: HOME | End: 2023-09-27
Attending: RADIOLOGY
Payer: COMMERCIAL

## 2023-09-27 ENCOUNTER — APPOINTMENT (OUTPATIENT)
Dept: RADIATION ONCOLOGY | Facility: HOSPITAL | Age: 53
End: 2023-09-27
Attending: RADIOLOGY
Payer: COMMERCIAL

## 2023-09-27 PROCEDURE — 77332 RADIATION TREATMENT AID(S): CPT | Performed by: RADIOLOGY

## 2023-09-27 PROCEDURE — 77280 THER RAD SIMULAJ FIELD SMPL: CPT | Performed by: RADIOLOGY

## 2023-09-27 PROCEDURE — 77412 RADIATION TX DELIVERY LVL 3: CPT | Performed by: RADIOLOGY

## 2023-09-28 ENCOUNTER — HOSPITAL ENCOUNTER (OUTPATIENT)
Dept: RADIATION ONCOLOGY | Facility: HOSPITAL | Age: 53
Discharge: HOME | End: 2023-09-28
Attending: RADIOLOGY
Payer: COMMERCIAL

## 2023-09-28 ENCOUNTER — APPOINTMENT (OUTPATIENT)
Dept: RADIATION ONCOLOGY | Facility: HOSPITAL | Age: 53
End: 2023-09-28
Attending: RADIOLOGY
Payer: COMMERCIAL

## 2023-09-28 VITALS
RESPIRATION RATE: 18 BRPM | HEART RATE: 67 BPM | DIASTOLIC BLOOD PRESSURE: 72 MMHG | TEMPERATURE: 98 F | SYSTOLIC BLOOD PRESSURE: 132 MMHG | OXYGEN SATURATION: 97 %

## 2023-09-28 DIAGNOSIS — Z17.1 MALIGNANT NEOPLASM OF CENTRAL PORTION OF LEFT BREAST IN FEMALE, ESTROGEN RECEPTOR NEGATIVE: Primary | ICD-10-CM

## 2023-09-28 DIAGNOSIS — C50.112 MALIGNANT NEOPLASM OF CENTRAL PORTION OF LEFT BREAST IN FEMALE, ESTROGEN RECEPTOR NEGATIVE: Primary | ICD-10-CM

## 2023-09-28 NOTE — PATIENT INSTRUCTIONS
- WE WILL CALL TO SCHEDULE YOUR FOLLOW-UP APPOINTMENT WITH DR. GU IN 3 MONTHS      - CALL [phone] IF YOU HAVE ANY QUESTIONS/CONCERNS REGARDING RADIATION THERAPY

## 2023-10-01 ENCOUNTER — HOSPITAL ENCOUNTER (OUTPATIENT)
Dept: RADIATION ONCOLOGY | Facility: HOSPITAL | Age: 53
Discharge: HOME | End: 2023-10-01
Attending: RADIOLOGY
Payer: COMMERCIAL

## 2023-10-02 PROCEDURE — 77387 GUIDANCE FOR RADJ TX DLVR: CPT | Performed by: RADIOLOGY

## 2023-10-02 PROCEDURE — 77412 RADIATION TX DELIVERY LVL 3: CPT | Performed by: RADIOLOGY

## 2023-10-03 ENCOUNTER — OFFICE VISIT (OUTPATIENT)
Dept: HEMATOLOGY/ONCOLOGY | Facility: HOSPITAL | Age: 53
End: 2023-10-03
Attending: INTERNAL MEDICINE
Payer: COMMERCIAL

## 2023-10-03 ENCOUNTER — DOCUMENTATION ONLY (OUTPATIENT)
Dept: RADIATION ONCOLOGY | Facility: HOSPITAL | Age: 53
End: 2023-10-03

## 2023-10-03 VITALS
HEIGHT: 66.22 IN | OXYGEN SATURATION: 99 % | DIASTOLIC BLOOD PRESSURE: 86 MMHG | HEART RATE: 66 BPM | TEMPERATURE: 98 F | RESPIRATION RATE: 18 BRPM | BODY MASS INDEX: 29.25 KG/M2 | WEIGHT: 182 LBS | SYSTOLIC BLOOD PRESSURE: 135 MMHG

## 2023-10-03 DIAGNOSIS — C50.112 MALIGNANT NEOPLASM OF CENTRAL PORTION OF LEFT BREAST IN FEMALE, ESTROGEN RECEPTOR NEGATIVE: Primary | ICD-10-CM

## 2023-10-03 DIAGNOSIS — Z17.1 MALIGNANT NEOPLASM OF CENTRAL PORTION OF LEFT BREAST IN FEMALE, ESTROGEN RECEPTOR NEGATIVE: Primary | ICD-10-CM

## 2023-10-03 DIAGNOSIS — Z51.11 CHEMOTHERAPY MANAGEMENT, ENCOUNTER FOR: ICD-10-CM

## 2023-10-03 DIAGNOSIS — D05.12 DUCTAL CARCINOMA IN SITU (DCIS) OF LEFT BREAST: ICD-10-CM

## 2023-10-03 DIAGNOSIS — D70.9 NEUTROPENIA, UNSPECIFIED TYPE (HCC): ICD-10-CM

## 2023-10-03 DIAGNOSIS — Z51.81 ENCOUNTER FOR MONITORING CARDIOTOXIC DRUG THERAPY: ICD-10-CM

## 2023-10-03 DIAGNOSIS — D50.0 IRON DEFICIENCY ANEMIA DUE TO CHRONIC BLOOD LOSS: ICD-10-CM

## 2023-10-03 DIAGNOSIS — Z79.899 ENCOUNTER FOR MONITORING CARDIOTOXIC DRUG THERAPY: ICD-10-CM

## 2023-10-03 DIAGNOSIS — Z98.890 S/P LUMPECTOMY, LEFT BREAST: ICD-10-CM

## 2023-10-03 LAB
ALBUMIN SERPL-MCNC: 3.3 G/DL (ref 3.4–5)
ALBUMIN/GLOB SERPL: 0.7 {RATIO} (ref 1–2)
ALP LIVER SERPL-CCNC: 63 U/L
ALT SERPL-CCNC: 39 U/L
ANION GAP SERPL CALC-SCNC: 7 MMOL/L (ref 0–18)
AST SERPL-CCNC: 24 U/L (ref 15–37)
BASOPHILS # BLD AUTO: 0.02 X10(3) UL (ref 0–0.2)
BASOPHILS NFR BLD AUTO: 0.8 %
BILIRUB SERPL-MCNC: 0.4 MG/DL (ref 0.1–2)
BUN BLD-MCNC: 12 MG/DL (ref 7–18)
CALCIUM BLD-MCNC: 9.5 MG/DL (ref 8.5–10.1)
CHLORIDE SERPL-SCNC: 106 MMOL/L (ref 98–112)
CO2 SERPL-SCNC: 26 MMOL/L (ref 21–32)
CREAT BLD-MCNC: 0.71 MG/DL
EGFRCR SERPLBLD CKD-EPI 2021: 102 ML/MIN/1.73M2 (ref 60–?)
EOSINOPHIL # BLD AUTO: 0.04 X10(3) UL (ref 0–0.7)
EOSINOPHIL NFR BLD AUTO: 1.6 %
ERYTHROCYTE [DISTWIDTH] IN BLOOD BY AUTOMATED COUNT: 13.2 %
GLOBULIN PLAS-MCNC: 4.8 G/DL (ref 2.8–4.4)
GLUCOSE BLD-MCNC: 149 MG/DL (ref 70–99)
HCT VFR BLD AUTO: 35.4 %
HGB BLD-MCNC: 12.1 G/DL
IMM GRANULOCYTES # BLD AUTO: 0 X10(3) UL (ref 0–1)
IMM GRANULOCYTES NFR BLD: 0 %
LYMPHOCYTES # BLD AUTO: 0.98 X10(3) UL (ref 1–4)
LYMPHOCYTES NFR BLD AUTO: 38.7 %
MCH RBC QN AUTO: 30.4 PG (ref 26–34)
MCHC RBC AUTO-ENTMCNC: 34.2 G/DL (ref 31–37)
MCV RBC AUTO: 88.9 FL
MONOCYTES # BLD AUTO: 0.21 X10(3) UL (ref 0.1–1)
MONOCYTES NFR BLD AUTO: 8.3 %
NEUTROPHILS # BLD AUTO: 1.28 X10 (3) UL (ref 1.5–7.7)
NEUTROPHILS # BLD AUTO: 1.28 X10(3) UL (ref 1.5–7.7)
NEUTROPHILS NFR BLD AUTO: 50.6 %
OSMOLALITY SERPL CALC.SUM OF ELEC: 291 MOSM/KG (ref 275–295)
PLATELET # BLD AUTO: 223 10(3)UL (ref 150–450)
POTASSIUM SERPL-SCNC: 3.8 MMOL/L (ref 3.5–5.1)
PROT SERPL-MCNC: 8.1 G/DL (ref 6.4–8.2)
RBC # BLD AUTO: 3.98 X10(6)UL
SODIUM SERPL-SCNC: 139 MMOL/L (ref 136–145)
WBC # BLD AUTO: 2.5 X10(3) UL (ref 4–11)

## 2023-10-03 PROCEDURE — 77412 RADIATION TX DELIVERY LVL 3: CPT | Performed by: RADIOLOGY

## 2023-10-03 PROCEDURE — 77336 RADIATION PHYSICS CONSULT: CPT | Performed by: RADIOLOGY

## 2023-10-03 PROCEDURE — 85025 COMPLETE CBC W/AUTO DIFF WBC: CPT

## 2023-10-03 PROCEDURE — 99215 OFFICE O/P EST HI 40 MIN: CPT | Performed by: INTERNAL MEDICINE

## 2023-10-03 PROCEDURE — 77417 THER RADIOLOGY PORT IMAGE(S): CPT | Performed by: RADIOLOGY

## 2023-10-03 PROCEDURE — 96413 CHEMO IV INFUSION 1 HR: CPT

## 2023-10-03 PROCEDURE — 77387 GUIDANCE FOR RADJ TX DLVR: CPT | Performed by: RADIOLOGY

## 2023-10-03 PROCEDURE — 80053 COMPREHEN METABOLIC PANEL: CPT

## 2023-10-03 NOTE — PROGRESS NOTES
Patient here for Sushil Wallis. Patient states that she has good appetite and adequate fluid intake. Patient denies n/v/d. Patient denies pain. Patient states that she is feeling really good with no complaints. Patient is accompanied with .     Education Record    Learner:  Patient and Spouse    Disease / Diagnosis: malignant neoplasm of breast    Barriers / Limitations:  None   Comments:    Method:  Discussion   Comments:    General Topics:  Diet, Medication, Side effects and symptom management, and Plan of care reviewed   Comments:    Outcome:  Shows understanding   Comments:

## 2023-10-03 NOTE — PROGRESS NOTES
Pt here for C7D1 trastuzumab. Arrives Ambulating independently, accompanied by Self       Oral medications included in this regimen:  no    Patient confirms comprehension of cancer treatment schedule:  yes    Pregnancy screening:  Not applicable    Modifications in dose or schedule:  Yes - next appt moved to Monday  10/23 due to MD schedule    Medications appearance and physical integrity checked by RN.  Chemotherapy IV pump settings verified by 2 RNs:  yes     Frequency of blood return and site check throughout administration: Prior to administration, Prior to each drug, and At completion of therapy     Infusion/treatment outcome:  patient tolerated treatment without incident    Education Record    Learner:  Patient  Barriers / Limitations:  None  Method:  Discussion  Education / instructions given:  plan of care, next appts  Outcome:  Shows understanding    Discharged Home, Ambulating independently, accompanied by:Self    Patient/family verbalized understanding of future appointments: by [facility]

## 2023-10-05 NOTE — PROGRESS NOTES
[facility] RADIATION ONCOLOGY  TREATMENT SUMMARY     PATIENT:  Jeremi Menon    REFERRING MD: Mai Hargrove MD; Hannah Correa MD  DIAGNOSIS:  L breast CA    HISTORY   15-year-old woman status post lumpectomy and sentinel lymph node biopsy for left retroareolar breast cancer. Was referred for adjuvant radiation therapy. DOSE DELIVERED     Left breast   4005 cGy in 15 fractions   6 MV photons   Prone   9/6/2023 to 9/26/2023     Boost   1000 cGy in 5 fractions   6 MV photons   3D   9/27/2023 to 10/3/2023     Concurrent systemic Rx  no  IGRT    yes    CLINICAL COURSE   Treatment was well-tolerated with mild skin reaction. Took Tylenol with relief of mild tenderness. PLAN   Follow-up in 3 months    Javier Reyes M.D. Radiation Oncology    CC: MD NIMCO Greene MD

## 2023-10-23 ENCOUNTER — OFFICE VISIT (OUTPATIENT)
Dept: HEMATOLOGY/ONCOLOGY | Facility: HOSPITAL | Age: 53
End: 2023-10-23
Attending: INTERNAL MEDICINE
Payer: COMMERCIAL

## 2023-10-23 VITALS
OXYGEN SATURATION: 100 % | DIASTOLIC BLOOD PRESSURE: 87 MMHG | BODY MASS INDEX: 29.49 KG/M2 | WEIGHT: 183.5 LBS | TEMPERATURE: 97 F | HEIGHT: 66.22 IN | RESPIRATION RATE: 16 BRPM | HEART RATE: 75 BPM | SYSTOLIC BLOOD PRESSURE: 137 MMHG

## 2023-10-23 DIAGNOSIS — G62.0 CHEMOTHERAPY-INDUCED PERIPHERAL NEUROPATHY: ICD-10-CM

## 2023-10-23 DIAGNOSIS — T45.1X5A CHEMOTHERAPY-INDUCED PERIPHERAL NEUROPATHY: ICD-10-CM

## 2023-10-23 DIAGNOSIS — C50.112 MALIGNANT NEOPLASM OF CENTRAL PORTION OF LEFT BREAST IN FEMALE, ESTROGEN RECEPTOR NEGATIVE: Primary | ICD-10-CM

## 2023-10-23 DIAGNOSIS — Z51.81 ENCOUNTER FOR MONITORING CARDIOTOXIC DRUG THERAPY: Primary | ICD-10-CM

## 2023-10-23 DIAGNOSIS — Z51.81 ENCOUNTER FOR MONITORING CARDIOTOXIC DRUG THERAPY: ICD-10-CM

## 2023-10-23 DIAGNOSIS — Z79.899 ENCOUNTER FOR MONITORING CARDIOTOXIC DRUG THERAPY: ICD-10-CM

## 2023-10-23 DIAGNOSIS — Z17.1 MALIGNANT NEOPLASM OF CENTRAL PORTION OF LEFT BREAST IN FEMALE, ESTROGEN RECEPTOR NEGATIVE: ICD-10-CM

## 2023-10-23 DIAGNOSIS — D70.9 NEUTROPENIA, UNSPECIFIED TYPE (HCC): ICD-10-CM

## 2023-10-23 DIAGNOSIS — Z79.899 ENCOUNTER FOR MONITORING CARDIOTOXIC DRUG THERAPY: Primary | ICD-10-CM

## 2023-10-23 DIAGNOSIS — C50.112 MALIGNANT NEOPLASM OF CENTRAL PORTION OF LEFT BREAST IN FEMALE, ESTROGEN RECEPTOR NEGATIVE: ICD-10-CM

## 2023-10-23 DIAGNOSIS — D05.12 DUCTAL CARCINOMA IN SITU (DCIS) OF LEFT BREAST: ICD-10-CM

## 2023-10-23 DIAGNOSIS — Z98.890 S/P LUMPECTOMY, LEFT BREAST: ICD-10-CM

## 2023-10-23 DIAGNOSIS — Z51.11 CHEMOTHERAPY MANAGEMENT, ENCOUNTER FOR: ICD-10-CM

## 2023-10-23 DIAGNOSIS — Z17.1 MALIGNANT NEOPLASM OF CENTRAL PORTION OF LEFT BREAST IN FEMALE, ESTROGEN RECEPTOR NEGATIVE: Primary | ICD-10-CM

## 2023-10-23 LAB
ALBUMIN SERPL-MCNC: 3.3 G/DL (ref 3.4–5)
ALBUMIN/GLOB SERPL: 0.7 {RATIO} (ref 1–2)
ALP LIVER SERPL-CCNC: 60 U/L
ALT SERPL-CCNC: 45 U/L
ANION GAP SERPL CALC-SCNC: 7 MMOL/L (ref 0–18)
AST SERPL-CCNC: 27 U/L (ref 15–37)
BASOPHILS # BLD AUTO: 0.01 X10(3) UL (ref 0–0.2)
BASOPHILS NFR BLD AUTO: 0.4 %
BILIRUB SERPL-MCNC: 0.3 MG/DL (ref 0.1–2)
BUN BLD-MCNC: 14 MG/DL (ref 7–18)
CALCIUM BLD-MCNC: 9 MG/DL (ref 8.5–10.1)
CHLORIDE SERPL-SCNC: 106 MMOL/L (ref 98–112)
CO2 SERPL-SCNC: 26 MMOL/L (ref 21–32)
CREAT BLD-MCNC: 0.81 MG/DL
EGFRCR SERPLBLD CKD-EPI 2021: 87 ML/MIN/1.73M2 (ref 60–?)
EOSINOPHIL # BLD AUTO: 0.04 X10(3) UL (ref 0–0.7)
EOSINOPHIL NFR BLD AUTO: 1.6 %
ERYTHROCYTE [DISTWIDTH] IN BLOOD BY AUTOMATED COUNT: 12.6 %
GLOBULIN PLAS-MCNC: 4.5 G/DL (ref 2.8–4.4)
GLUCOSE BLD-MCNC: 180 MG/DL (ref 70–99)
HCT VFR BLD AUTO: 35.2 %
HGB BLD-MCNC: 12 G/DL
IMM GRANULOCYTES # BLD AUTO: 0 X10(3) UL (ref 0–1)
IMM GRANULOCYTES NFR BLD: 0 %
LYMPHOCYTES # BLD AUTO: 0.96 X10(3) UL (ref 1–4)
LYMPHOCYTES NFR BLD AUTO: 39.3 %
MCH RBC QN AUTO: 30.2 PG (ref 26–34)
MCHC RBC AUTO-ENTMCNC: 34.1 G/DL (ref 31–37)
MCV RBC AUTO: 88.7 FL
MONOCYTES # BLD AUTO: 0.27 X10(3) UL (ref 0.1–1)
MONOCYTES NFR BLD AUTO: 11.1 %
NEUTROPHILS # BLD AUTO: 1.16 X10 (3) UL (ref 1.5–7.7)
NEUTROPHILS # BLD AUTO: 1.16 X10(3) UL (ref 1.5–7.7)
NEUTROPHILS NFR BLD AUTO: 47.6 %
OSMOLALITY SERPL CALC.SUM OF ELEC: 293 MOSM/KG (ref 275–295)
PLATELET # BLD AUTO: 178 10(3)UL (ref 150–450)
POTASSIUM SERPL-SCNC: 4 MMOL/L (ref 3.5–5.1)
PROT SERPL-MCNC: 7.8 G/DL (ref 6.4–8.2)
RBC # BLD AUTO: 3.97 X10(6)UL
SODIUM SERPL-SCNC: 139 MMOL/L (ref 136–145)
WBC # BLD AUTO: 2.4 X10(3) UL (ref 4–11)

## 2023-10-23 PROCEDURE — 85025 COMPLETE CBC W/AUTO DIFF WBC: CPT

## 2023-10-23 PROCEDURE — 99215 OFFICE O/P EST HI 40 MIN: CPT | Performed by: INTERNAL MEDICINE

## 2023-10-23 PROCEDURE — 80053 COMPREHEN METABOLIC PANEL: CPT

## 2023-10-23 PROCEDURE — 96413 CHEMO IV INFUSION 1 HR: CPT

## 2023-10-23 NOTE — PROGRESS NOTES
Pt here for C8D1 Drug(s)Trazimera. Arrives Ambulating independently, accompanied by Self     Patient was evaluated today by MD.    Oral medications included in this regimen:  no    Patient confirms comprehension of cancer treatment schedule:  yes    Pregnancy screening:  Denies possibility of pregnancy    Modifications in dose or schedule:  No    Medications appearance and physical integrity checked by RN: yes. Chemotherapy IV pump settings verified by 2 RNs:  No due to targeted therapy IV administration. Frequency of blood return and site check throughout administration: Prior to administration and At completion of therapy     Infusion/treatment outcome:  patient tolerated treatment without incident    Education Record    Learner:  Patient  Barriers / Limitations:  None  Method:  Discussion  Education / instructions given:   Today's regime  Outcome:  Shows understanding    Discharged Home, Ambulating independently, accompanied by:Self    Patient/family verbalized understanding of future appointments: by [facility]

## 2023-10-23 NOTE — PROGRESS NOTES
Patient here for Junius Gosselin. Patient states that she is has a good appetite and adequate fluid intake. Patient states that she has increased neuropathy in feet. Patient has no further concerns or concerns at this time.      Education Record    Learner:  Patient    Disease / Diagnosis: malignant neoplasm of breast    Barriers / Limitations:  None   Comments:    Method:  Discussion   Comments:    General Topics:  Medication, Side effects and symptom management, and Plan of care reviewed   Comments:    Outcome:  Shows understanding   Comments:

## 2023-10-27 ENCOUNTER — TELEPHONE (OUTPATIENT)
Dept: RADIATION ONCOLOGY | Facility: HOSPITAL | Age: 53
End: 2023-10-27

## 2023-11-13 ENCOUNTER — OFFICE VISIT (OUTPATIENT)
Dept: HEMATOLOGY/ONCOLOGY | Facility: HOSPITAL | Age: 53
End: 2023-11-13
Attending: INTERNAL MEDICINE
Payer: COMMERCIAL

## 2023-11-13 VITALS
SYSTOLIC BLOOD PRESSURE: 145 MMHG | TEMPERATURE: 97 F | BODY MASS INDEX: 29.54 KG/M2 | HEIGHT: 66.22 IN | WEIGHT: 183.81 LBS | DIASTOLIC BLOOD PRESSURE: 92 MMHG | HEART RATE: 59 BPM | OXYGEN SATURATION: 100 %

## 2023-11-13 DIAGNOSIS — G62.0 CHEMOTHERAPY-INDUCED PERIPHERAL NEUROPATHY: ICD-10-CM

## 2023-11-13 DIAGNOSIS — Z51.81 ENCOUNTER FOR MONITORING CARDIOTOXIC DRUG THERAPY: Primary | ICD-10-CM

## 2023-11-13 DIAGNOSIS — Z17.1 MALIGNANT NEOPLASM OF CENTRAL PORTION OF LEFT BREAST IN FEMALE, ESTROGEN RECEPTOR NEGATIVE: ICD-10-CM

## 2023-11-13 DIAGNOSIS — D70.9 NEUTROPENIA, UNSPECIFIED TYPE (HCC): ICD-10-CM

## 2023-11-13 DIAGNOSIS — D50.0 IRON DEFICIENCY ANEMIA DUE TO CHRONIC BLOOD LOSS: ICD-10-CM

## 2023-11-13 DIAGNOSIS — Z51.11 CHEMOTHERAPY MANAGEMENT, ENCOUNTER FOR: ICD-10-CM

## 2023-11-13 DIAGNOSIS — C50.112 MALIGNANT NEOPLASM OF CENTRAL PORTION OF LEFT BREAST IN FEMALE, ESTROGEN RECEPTOR NEGATIVE: ICD-10-CM

## 2023-11-13 DIAGNOSIS — Z79.899 ENCOUNTER FOR MONITORING CARDIOTOXIC DRUG THERAPY: Primary | ICD-10-CM

## 2023-11-13 DIAGNOSIS — C50.112 MALIGNANT NEOPLASM OF CENTRAL PORTION OF LEFT BREAST IN FEMALE, ESTROGEN RECEPTOR NEGATIVE: Primary | ICD-10-CM

## 2023-11-13 DIAGNOSIS — Z98.890 S/P LUMPECTOMY, LEFT BREAST: ICD-10-CM

## 2023-11-13 DIAGNOSIS — Z17.1 MALIGNANT NEOPLASM OF CENTRAL PORTION OF LEFT BREAST IN FEMALE, ESTROGEN RECEPTOR NEGATIVE: Primary | ICD-10-CM

## 2023-11-13 DIAGNOSIS — D05.12 DUCTAL CARCINOMA IN SITU (DCIS) OF LEFT BREAST: ICD-10-CM

## 2023-11-13 DIAGNOSIS — T45.1X5A CHEMOTHERAPY-INDUCED PERIPHERAL NEUROPATHY: ICD-10-CM

## 2023-11-13 LAB
ALBUMIN SERPL-MCNC: 3.3 G/DL (ref 3.4–5)
ALBUMIN/GLOB SERPL: 0.7 {RATIO} (ref 1–2)
ALP LIVER SERPL-CCNC: 64 U/L
ALT SERPL-CCNC: 39 U/L
ANION GAP SERPL CALC-SCNC: 1 MMOL/L (ref 0–18)
AST SERPL-CCNC: 29 U/L (ref 15–37)
BASOPHILS # BLD AUTO: 0.01 X10(3) UL (ref 0–0.2)
BASOPHILS NFR BLD AUTO: 0.4 %
BILIRUB SERPL-MCNC: 0.3 MG/DL (ref 0.1–2)
BUN BLD-MCNC: 11 MG/DL (ref 9–23)
CALCIUM BLD-MCNC: 9.3 MG/DL (ref 8.5–10.1)
CHLORIDE SERPL-SCNC: 108 MMOL/L (ref 98–112)
CO2 SERPL-SCNC: 28 MMOL/L (ref 21–32)
CREAT BLD-MCNC: 0.9 MG/DL
DEPRECATED HBV CORE AB SER IA-ACNC: 190.8 NG/ML
EGFRCR SERPLBLD CKD-EPI 2021: 76 ML/MIN/1.73M2 (ref 60–?)
EOSINOPHIL # BLD AUTO: 0.02 X10(3) UL (ref 0–0.7)
EOSINOPHIL NFR BLD AUTO: 0.9 %
ERYTHROCYTE [DISTWIDTH] IN BLOOD BY AUTOMATED COUNT: 12.6 %
GLOBULIN PLAS-MCNC: 4.6 G/DL (ref 2.8–4.4)
GLUCOSE BLD-MCNC: 137 MG/DL (ref 70–99)
HCT VFR BLD AUTO: 36.5 %
HGB BLD-MCNC: 12.3 G/DL
IMM GRANULOCYTES # BLD AUTO: 0 X10(3) UL (ref 0–1)
IMM GRANULOCYTES NFR BLD: 0 %
IRON SATN MFR SERPL: 41 %
IRON SERPL-MCNC: 131 UG/DL
LYMPHOCYTES # BLD AUTO: 0.92 X10(3) UL (ref 1–4)
LYMPHOCYTES NFR BLD AUTO: 41.3 %
MCH RBC QN AUTO: 30.4 PG (ref 26–34)
MCHC RBC AUTO-ENTMCNC: 33.7 G/DL (ref 31–37)
MCV RBC AUTO: 90.1 FL
MONOCYTES # BLD AUTO: 0.26 X10(3) UL (ref 0.1–1)
MONOCYTES NFR BLD AUTO: 11.7 %
NEUTROPHILS # BLD AUTO: 1.02 X10 (3) UL (ref 1.5–7.7)
NEUTROPHILS # BLD AUTO: 1.02 X10(3) UL (ref 1.5–7.7)
NEUTROPHILS NFR BLD AUTO: 45.7 %
OSMOLALITY SERPL CALC.SUM OF ELEC: 286 MOSM/KG (ref 275–295)
PLATELET # BLD AUTO: 196 10(3)UL (ref 150–450)
POTASSIUM SERPL-SCNC: 3.7 MMOL/L (ref 3.5–5.1)
PROT SERPL-MCNC: 7.9 G/DL (ref 6.4–8.2)
RBC # BLD AUTO: 4.05 X10(6)UL
SODIUM SERPL-SCNC: 137 MMOL/L (ref 136–145)
TIBC SERPL-MCNC: 322 UG/DL (ref 240–450)
TRANSFERRIN SERPL-MCNC: 216 MG/DL (ref 200–360)
WBC # BLD AUTO: 2.2 X10(3) UL (ref 4–11)

## 2023-11-13 PROCEDURE — 85025 COMPLETE CBC W/AUTO DIFF WBC: CPT

## 2023-11-13 PROCEDURE — 96413 CHEMO IV INFUSION 1 HR: CPT

## 2023-11-13 PROCEDURE — 82728 ASSAY OF FERRITIN: CPT

## 2023-11-13 PROCEDURE — 83550 IRON BINDING TEST: CPT

## 2023-11-13 PROCEDURE — 80053 COMPREHEN METABOLIC PANEL: CPT

## 2023-11-13 PROCEDURE — 83540 ASSAY OF IRON: CPT

## 2023-11-13 NOTE — PROGRESS NOTES
Here for C9D1 Trastuzumab. Neuropathy unchanged. Appetite great. Energy good. No pain. Only new thing is that she got her period again 3 days ago.  It is lighter than usual.

## 2023-11-13 NOTE — PROGRESS NOTES
Pt here for C 9 D 1  Drug(s)trazimera. Arrives Ambulating independently, accompanied by Self and Spouse     Patient was evaluated today by MD.    Oral medications included in this regimen:  no    Patient confirms comprehension of cancer treatment schedule:  yes    Pregnancy screening:  Not applicable    Modifications in dose or schedule:  No    Medications appearance and physical integrity checked by RN: yes. Chemotherapy IV pump settings verified by 2 RNs:  No due to targeted therapy IV administration.   Frequency of blood return and site check throughout administration: Prior to administration and At completion of therapy     Infusion/treatment outcome:  patient tolerated treatment without incident    Education Record    Learner:  Patient and Spouse  Barriers / Limitations:  None  Method:  Discussion  Education / instructions given:    Outcome:  Shows understanding    Discharged Home, Ambulating independently, accompanied by:Self and Spouse    Patient/family verbalized understanding of future appointments: by [facility]

## 2023-11-29 ENCOUNTER — HOSPITAL ENCOUNTER (OUTPATIENT)
Dept: MAMMOGRAPHY | Facility: HOSPITAL | Age: 53
Discharge: HOME | End: 2023-11-29
Attending: SURGERY
Payer: COMMERCIAL

## 2023-11-29 DIAGNOSIS — Z17.1 MALIGNANT NEOPLASM OF CENTRAL PORTION OF LEFT BREAST IN FEMALE, ESTROGEN RECEPTOR NEGATIVE: ICD-10-CM

## 2023-11-29 DIAGNOSIS — C50.112 MALIGNANT NEOPLASM OF CENTRAL PORTION OF LEFT BREAST IN FEMALE, ESTROGEN RECEPTOR NEGATIVE: ICD-10-CM

## 2023-11-29 PROCEDURE — 77062 BREAST TOMOSYNTHESIS BI: CPT | Performed by: SURGERY

## 2023-11-29 PROCEDURE — 77066 DX MAMMO INCL CAD BI: CPT | Performed by: SURGERY

## 2023-12-04 ENCOUNTER — OFFICE VISIT (OUTPATIENT)
Dept: HEMATOLOGY/ONCOLOGY | Facility: HOSPITAL | Age: 53
End: 2023-12-04
Attending: INTERNAL MEDICINE
Payer: COMMERCIAL

## 2023-12-04 VITALS
DIASTOLIC BLOOD PRESSURE: 91 MMHG | OXYGEN SATURATION: 98 % | HEIGHT: 66.22 IN | SYSTOLIC BLOOD PRESSURE: 143 MMHG | HEART RATE: 67 BPM | WEIGHT: 181.81 LBS | TEMPERATURE: 98 F | BODY MASS INDEX: 29.22 KG/M2 | RESPIRATION RATE: 18 BRPM

## 2023-12-04 DIAGNOSIS — Z79.899 ENCOUNTER FOR MONITORING CARDIOTOXIC DRUG THERAPY: ICD-10-CM

## 2023-12-04 DIAGNOSIS — D70.9 NEUTROPENIA, UNSPECIFIED TYPE (HCC): ICD-10-CM

## 2023-12-04 DIAGNOSIS — Z51.11 CHEMOTHERAPY MANAGEMENT, ENCOUNTER FOR: ICD-10-CM

## 2023-12-04 DIAGNOSIS — D05.12 DUCTAL CARCINOMA IN SITU (DCIS) OF LEFT BREAST: ICD-10-CM

## 2023-12-04 DIAGNOSIS — Z51.81 ENCOUNTER FOR MONITORING CARDIOTOXIC DRUG THERAPY: ICD-10-CM

## 2023-12-04 DIAGNOSIS — Z17.1 MALIGNANT NEOPLASM OF CENTRAL PORTION OF LEFT BREAST IN FEMALE, ESTROGEN RECEPTOR NEGATIVE: ICD-10-CM

## 2023-12-04 DIAGNOSIS — C50.112 MALIGNANT NEOPLASM OF CENTRAL PORTION OF LEFT BREAST IN FEMALE, ESTROGEN RECEPTOR NEGATIVE: ICD-10-CM

## 2023-12-04 DIAGNOSIS — Z17.1 MALIGNANT NEOPLASM OF CENTRAL PORTION OF LEFT BREAST IN FEMALE, ESTROGEN RECEPTOR NEGATIVE: Primary | ICD-10-CM

## 2023-12-04 DIAGNOSIS — Z51.81 ENCOUNTER FOR MONITORING CARDIOTOXIC DRUG THERAPY: Primary | ICD-10-CM

## 2023-12-04 DIAGNOSIS — Z79.899 ENCOUNTER FOR MONITORING CARDIOTOXIC DRUG THERAPY: Primary | ICD-10-CM

## 2023-12-04 DIAGNOSIS — Z98.890 S/P LUMPECTOMY, LEFT BREAST: ICD-10-CM

## 2023-12-04 DIAGNOSIS — C50.112 MALIGNANT NEOPLASM OF CENTRAL PORTION OF LEFT BREAST IN FEMALE, ESTROGEN RECEPTOR NEGATIVE: Primary | ICD-10-CM

## 2023-12-04 LAB
ALBUMIN SERPL-MCNC: 3.4 G/DL (ref 3.4–5)
ALBUMIN/GLOB SERPL: 0.7 {RATIO} (ref 1–2)
ALP LIVER SERPL-CCNC: 73 U/L
ALT SERPL-CCNC: 55 U/L
ANION GAP SERPL CALC-SCNC: 5 MMOL/L (ref 0–18)
AST SERPL-CCNC: 34 U/L (ref 15–37)
BASOPHILS # BLD AUTO: 0.02 X10(3) UL (ref 0–0.2)
BASOPHILS NFR BLD AUTO: 0.8 %
BILIRUB SERPL-MCNC: 0.2 MG/DL (ref 0.1–2)
BUN BLD-MCNC: 7 MG/DL (ref 9–23)
CALCIUM BLD-MCNC: 9.2 MG/DL (ref 8.5–10.1)
CHLORIDE SERPL-SCNC: 108 MMOL/L (ref 98–112)
CO2 SERPL-SCNC: 27 MMOL/L (ref 21–32)
CREAT BLD-MCNC: 0.81 MG/DL
EGFRCR SERPLBLD CKD-EPI 2021: 87 ML/MIN/1.73M2 (ref 60–?)
EOSINOPHIL # BLD AUTO: 0.05 X10(3) UL (ref 0–0.7)
EOSINOPHIL NFR BLD AUTO: 1.9 %
ERYTHROCYTE [DISTWIDTH] IN BLOOD BY AUTOMATED COUNT: 11.9 %
GLOBULIN PLAS-MCNC: 5 G/DL (ref 2.8–4.4)
GLUCOSE BLD-MCNC: 123 MG/DL (ref 70–99)
HCT VFR BLD AUTO: 38.6 %
HGB BLD-MCNC: 12.7 G/DL
IMM GRANULOCYTES # BLD AUTO: 0 X10(3) UL (ref 0–1)
IMM GRANULOCYTES NFR BLD: 0 %
LYMPHOCYTES # BLD AUTO: 1.15 X10(3) UL (ref 1–4)
LYMPHOCYTES NFR BLD AUTO: 44.4 %
MCH RBC QN AUTO: 30 PG (ref 26–34)
MCHC RBC AUTO-ENTMCNC: 32.9 G/DL (ref 31–37)
MCV RBC AUTO: 91 FL
MONOCYTES # BLD AUTO: 0.24 X10(3) UL (ref 0.1–1)
MONOCYTES NFR BLD AUTO: 9.3 %
NEUTROPHILS # BLD AUTO: 1.13 X10 (3) UL (ref 1.5–7.7)
NEUTROPHILS # BLD AUTO: 1.13 X10(3) UL (ref 1.5–7.7)
NEUTROPHILS NFR BLD AUTO: 43.6 %
OSMOLALITY SERPL CALC.SUM OF ELEC: 289 MOSM/KG (ref 275–295)
PLATELET # BLD AUTO: 192 10(3)UL (ref 150–450)
POTASSIUM SERPL-SCNC: 3.7 MMOL/L (ref 3.5–5.1)
PROT SERPL-MCNC: 8.4 G/DL (ref 6.4–8.2)
RBC # BLD AUTO: 4.24 X10(6)UL
SODIUM SERPL-SCNC: 140 MMOL/L (ref 136–145)
WBC # BLD AUTO: 2.6 X10(3) UL (ref 4–11)

## 2023-12-04 PROCEDURE — 96413 CHEMO IV INFUSION 1 HR: CPT

## 2023-12-04 PROCEDURE — 85025 COMPLETE CBC W/AUTO DIFF WBC: CPT

## 2023-12-04 PROCEDURE — 99215 OFFICE O/P EST HI 40 MIN: CPT | Performed by: INTERNAL MEDICINE

## 2023-12-04 PROCEDURE — 80053 COMPREHEN METABOLIC PANEL: CPT

## 2023-12-04 NOTE — PROGRESS NOTES
Pt here for C10D1 Drug(s)Trazimera. Arrives Ambulating independently, accompanied by Self     Patient was evaluated today by MD and Treatment Nurse. Oral medications included in this regimen:  no    Patient confirms comprehension of cancer treatment schedule:  yes    Pregnancy screening:  Denies possibility of pregnancy    Modifications in dose or schedule:  No    Medications appearance and physical integrity checked by RN: yes. Chemotherapy IV pump settings verified by 2 RNs:  No due to targeted therapy IV administration. Frequency of blood return and site check throughout administration: Prior to administration and At completion of therapy     Infusion/treatment outcome:  patient tolerated treatment without incident    Education Record    Learner:  Patient and Spouse  Barriers / Limitations:  None  Method:  Brief focused and Discussion  Education / instructions given:  Plan of care and next appointments reviewed. Pt states she will f/u with Dr Jenifer Velazco @ [place] but will have her treatments in [facility]. Pt states she wants to continue Monday for treatment days. Pt aware that she will see the APN her next 2 treatments (end of Dec and mid Jan) in [facility] and will need to see Dr Jenifer Velazco prior to her Feb treatment. Pt aware she will need to call and schedule her appointments with Dr Jenifer Velazco as our schedulers are not able to schedule for [place]. Pt encouraged to try to schedule her appointment with Kamila on the Friday before her Monday treatment. Outcome:  Shows understanding    Discharged Home, Ambulating independently, accompanied by:Self in stable condition, no new complaints.     Patient/family verbalized understanding of future appointments: by printed AVS

## 2023-12-04 NOTE — PATIENT INSTRUCTIONS
You are due to see Dr Eliot Anthony for your 2/5/24 treatment. Please call [place] to schedule appointment with her on the Friday before.

## 2023-12-04 NOTE — PROGRESS NOTES
Patient here for [facility]. Patient states that she has good appetite and adequate fluid intake. Patient denies pain. Patient has no current concerns or complaints at this time.      Education Record    Learner:  Patient    Disease / Diagnosis: malignant neoplasm of breast    Barriers / Limitations:  None   Comments:    Method:  Discussion   Comments:    General Topics:  Medication, Side effects and symptom management, and Plan of care reviewed   Comments:    Outcome:  Shows understanding   Comments:

## 2023-12-11 ENCOUNTER — TELEPHONE (OUTPATIENT)
Dept: HEMATOLOGY/ONCOLOGY | Facility: HOSPITAL | Age: 53
End: 2023-12-11

## 2023-12-11 NOTE — TELEPHONE ENCOUNTER
Patient called to see when she should schedule a follow up with Dr Peyton Abarca. Please call her back. Thank you.

## 2023-12-11 NOTE — TELEPHONE ENCOUNTER
Called patient's spouse in regards to scheduling follow-up appointment with Dr. Lloyd Cheema after patient had her 6-month post lumpectomy surgery. Scheduled patient with Dr. Lloyd hCeema on Tuesday December 19, 2023 at 1000 at the [facility]. He thanked me for the phone call and assistance. Pt was provided with the breast nurse navigators contact information and was encouraged to phone with any other questions or concerns.

## 2023-12-19 ENCOUNTER — OFFICE VISIT (OUTPATIENT)
Dept: HEMATOLOGY/ONCOLOGY | Facility: HOSPITAL | Age: 53
End: 2023-12-19
Attending: INTERNAL MEDICINE
Payer: COMMERCIAL

## 2023-12-19 ENCOUNTER — OFFICE VISIT (OUTPATIENT)
Facility: LOCATION | Age: 53
End: 2023-12-19
Payer: COMMERCIAL

## 2023-12-19 VITALS
DIASTOLIC BLOOD PRESSURE: 86 MMHG | RESPIRATION RATE: 16 BRPM | SYSTOLIC BLOOD PRESSURE: 152 MMHG | OXYGEN SATURATION: 99 % | HEIGHT: 66.22 IN | TEMPERATURE: 97 F | HEART RATE: 78 BPM | WEIGHT: 182 LBS | BODY MASS INDEX: 29.25 KG/M2

## 2023-12-19 DIAGNOSIS — C50.112 MALIGNANT NEOPLASM OF CENTRAL PORTION OF LEFT BREAST IN FEMALE, ESTROGEN RECEPTOR NEGATIVE  (HCC): Primary | ICD-10-CM

## 2023-12-19 DIAGNOSIS — Z17.1 MALIGNANT NEOPLASM OF CENTRAL PORTION OF LEFT BREAST IN FEMALE, ESTROGEN RECEPTOR NEGATIVE  (HCC): Primary | ICD-10-CM

## 2023-12-19 PROCEDURE — 99211 OFF/OP EST MAY X REQ PHY/QHP: CPT

## 2023-12-19 PROCEDURE — 99213 OFFICE O/P EST LOW 20 MIN: CPT | Performed by: SURGERY

## 2023-12-19 NOTE — PROGRESS NOTES
Subjective:   Patient ID: Americo Leon is a 48year old female who underwent x-ray localized left breast lumpectomy with sentinel lymph node biopsy on May 5, 2023. Preoperatively, the patient had left breast DCIS with microinvasion. 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 She received chemotherapy (Taxol and Herceptin), which she completed August 1, 2023. She completed radiation therapy on October 3, 2023. She is now on maintenance Herceptin every 3 weeks to complete 1 year. She presents for surveillance. She has noticed no new changes to her breasts. She is interested in nipple reconstruction. HPI    History/Other:   Review of Systems  Current Outpatient Medications   Medication Sig Dispense Refill    Potassium Chloride ER 10 MEQ Oral Tab CR Take 1 tablet (10 mEq total) by mouth 2 (two) times daily. (Patient not taking: Reported on 12/19/2023) 60 tablet 0    hydrocortisone 2.5 % External Cream Apply 1 Application topically 2 (two) times daily. For facial rash 20 g 0    atenolol 50 MG Oral Tab Take 1 tablet (50 mg total) by mouth daily. atorvastatin 20 MG Oral Tab Take 1 tablet (20 mg total) by mouth daily. Multiple Vitamins-Minerals (CENTRUM ADULTS OR) Take by mouth daily. Ferrous Sulfate (IRON) 325 (65 Fe) MG Oral Tab Take by mouth daily. Allergies:No Known Allergies    Objective:   Physical Exam  Vitals and nursing note reviewed. Constitutional:       General: She is not in acute distress. Appearance: She is well-developed. She is not diaphoretic. HENT:      Head: Normocephalic and atraumatic. Eyes:      General: No scleral icterus. Conjunctiva/sclera: Conjunctivae normal.      Pupils: Pupils are equal, round, and reactive to light. Neck:      Vascular: No JVD. Trachea: Trachea normal.   Chest:      Chest wall: No mass.    Breasts:     Breasts are symmetrical.      Right: No inverted nipple, mass, nipple discharge, skin change or tenderness. Left: No mass, skin change or tenderness. Comments: Well-healed left breast and left axillary scars with expected darkening of the skin consistent with radiation therapy  Musculoskeletal:      Cervical back: Full passive range of motion without pain and neck supple. Lymphadenopathy:      Upper Body:      Right upper body: No axillary or pectoral adenopathy. Left upper body: No axillary or pectoral adenopathy. Neurological:      Mental Status: She is alert and oriented to person, place, and time. Psychiatric:         Speech: Speech normal.         Behavior: Behavior normal.         MAMMOGRAM   I personally viewed the films and agree with the radiologist's interpretation. FINDINGS:  There are new post lumpectomy changes left breast.  No suspicious calcifications. Stable parenchymal pattern right breast.                     Impression   CONCLUSION:  New post lumpectomy changes left breast.  Recommend repeat mammogram left breast in 6 months to assess for stability post lumpectomy. BI-RADS CATEGORY:    DIAGNOSTIC CATEGORY 2--BENIGN FINDING:       RECOMMENDATIONS:    SHORT TERM FOLLOW-UP DIAGNOSTIC MAMMOGRAM LEFT BREAST IN 6 MONTHS. A letter explaining the results in lay terms has been sent to the patient. This exam was evaluated with a computer-aided device. This patient's information has been entered into a reminder system with a target due date for the next mammogram.        LOCATION:  [facility]        Dictated by (CST): Patrick Duarte MD on 11/29/2023 at 8:00 AM             Assessment & Plan:   1. Malignant neoplasm of central portion of left breast in female, estrogen receptor negative  ([facility] [address]        The patient has no evidence of disease. She will be due for a left breast mammogram in 6 months. This has already been ordered by Dr. Nicki Ramos.   I discussed that she will obtain a left breast mammogram, alternating with bilateral mammograms, every 6 months for the first 2 years. She will then start annual diagnostic mammograms up to 5 years. I also recommended a breast exam every 6 months for 5 years. I referred her to plastic surgery to discuss options for nipple reconstruction. I did caution her that they will not want to do surgery until after 6 months after completing radiation therapy.     Brock Ozuna MD

## 2023-12-19 NOTE — PROGRESS NOTES
Patient presents to clinic for breast follow up and exam. Last seen by Dr. Klaudia Villalobos on 5/16/23 for follow up after a left breast lumpectomy and sentinel lymph node biopsy. [facility] TONA 2D+3D screening bilateral views performed on 11/29/23 resulted as benign. Patient complains of occasional pain at the left breast reaching 2/10 only. Patient states that she has noticed discoloration in her hands and that her menstrual cycle has been affected by treatment. Patient denies swelling, redness, warmth, bleeding/discharge, or fever. Dr. Klaudia Villalobos informed. Medication, allergies, and history reviewed and updated.

## 2023-12-21 ENCOUNTER — PATIENT OUTREACH (OUTPATIENT)
Facility: LOCATION | Age: 53
End: 2023-12-21

## 2023-12-26 ENCOUNTER — TELEPHONE (OUTPATIENT)
Dept: HEMATOLOGY/ONCOLOGY | Facility: HOSPITAL | Age: 53
End: 2023-12-26

## 2023-12-26 ENCOUNTER — OFFICE VISIT (OUTPATIENT)
Dept: HEMATOLOGY/ONCOLOGY | Facility: HOSPITAL | Age: 53
End: 2023-12-26
Attending: INTERNAL MEDICINE
Payer: COMMERCIAL

## 2023-12-26 VITALS
BODY MASS INDEX: 29.73 KG/M2 | OXYGEN SATURATION: 98 % | RESPIRATION RATE: 6 BRPM | TEMPERATURE: 98 F | DIASTOLIC BLOOD PRESSURE: 86 MMHG | WEIGHT: 185 LBS | HEIGHT: 66.22 IN | HEART RATE: 91 BPM | SYSTOLIC BLOOD PRESSURE: 147 MMHG

## 2023-12-26 DIAGNOSIS — Z17.1 MALIGNANT NEOPLASM OF CENTRAL PORTION OF LEFT BREAST IN FEMALE, ESTROGEN RECEPTOR NEGATIVE  (HCC): Primary | ICD-10-CM

## 2023-12-26 DIAGNOSIS — B30.9 ACUTE VIRAL CONJUNCTIVITIS OF BOTH EYES: ICD-10-CM

## 2023-12-26 DIAGNOSIS — Z79.899 ENCOUNTER FOR MONITORING CARDIOTOXIC DRUG THERAPY: ICD-10-CM

## 2023-12-26 DIAGNOSIS — C50.112 MALIGNANT NEOPLASM OF CENTRAL PORTION OF LEFT BREAST IN FEMALE, ESTROGEN RECEPTOR NEGATIVE  (HCC): Primary | ICD-10-CM

## 2023-12-26 DIAGNOSIS — Z51.81 ENCOUNTER FOR MONITORING CARDIOTOXIC DRUG THERAPY: ICD-10-CM

## 2023-12-26 LAB
ALBUMIN SERPL-MCNC: 3.2 G/DL (ref 3.4–5)
ALBUMIN/GLOB SERPL: 0.6 {RATIO} (ref 1–2)
ALP LIVER SERPL-CCNC: 76 U/L
ALT SERPL-CCNC: 37 U/L
ANION GAP SERPL CALC-SCNC: 7 MMOL/L (ref 0–18)
AST SERPL-CCNC: 23 U/L (ref 15–37)
BASOPHILS # BLD AUTO: 0.01 X10(3) UL (ref 0–0.2)
BASOPHILS NFR BLD AUTO: 0.3 %
BILIRUB SERPL-MCNC: 0.3 MG/DL (ref 0.1–2)
BUN BLD-MCNC: 8 MG/DL (ref 9–23)
CALCIUM BLD-MCNC: 9.1 MG/DL (ref 8.5–10.1)
CHLORIDE SERPL-SCNC: 104 MMOL/L (ref 98–112)
CO2 SERPL-SCNC: 27 MMOL/L (ref 21–32)
CREAT BLD-MCNC: 0.9 MG/DL
EGFRCR SERPLBLD CKD-EPI 2021: 76 ML/MIN/1.73M2 (ref 60–?)
EOSINOPHIL # BLD AUTO: 0.08 X10(3) UL (ref 0–0.7)
EOSINOPHIL NFR BLD AUTO: 2 %
ERYTHROCYTE [DISTWIDTH] IN BLOOD BY AUTOMATED COUNT: 12.1 %
FASTING STATUS PATIENT QL REPORTED: NO
GLOBULIN PLAS-MCNC: 5.1 G/DL (ref 2.8–4.4)
GLUCOSE BLD-MCNC: 183 MG/DL (ref 70–99)
HCT VFR BLD AUTO: 36.2 %
HGB BLD-MCNC: 12.1 G/DL
IMM GRANULOCYTES # BLD AUTO: 0 X10(3) UL (ref 0–1)
IMM GRANULOCYTES NFR BLD: 0 %
LYMPHOCYTES # BLD AUTO: 0.98 X10(3) UL (ref 1–4)
LYMPHOCYTES NFR BLD AUTO: 24.7 %
MCH RBC QN AUTO: 30 PG (ref 26–34)
MCHC RBC AUTO-ENTMCNC: 33.4 G/DL (ref 31–37)
MCV RBC AUTO: 89.6 FL
MONOCYTES # BLD AUTO: 0.36 X10(3) UL (ref 0.1–1)
MONOCYTES NFR BLD AUTO: 9.1 %
NEUTROPHILS # BLD AUTO: 2.54 X10 (3) UL (ref 1.5–7.7)
NEUTROPHILS # BLD AUTO: 2.54 X10(3) UL (ref 1.5–7.7)
NEUTROPHILS NFR BLD AUTO: 63.9 %
OSMOLALITY SERPL CALC.SUM OF ELEC: 289 MOSM/KG (ref 275–295)
PLATELET # BLD AUTO: 209 10(3)UL (ref 150–450)
POTASSIUM SERPL-SCNC: 3.9 MMOL/L (ref 3.5–5.1)
PROT SERPL-MCNC: 8.3 G/DL (ref 6.4–8.2)
RBC # BLD AUTO: 4.04 X10(6)UL
SODIUM SERPL-SCNC: 138 MMOL/L (ref 136–145)
WBC # BLD AUTO: 4 X10(3) UL (ref 4–11)

## 2023-12-26 PROCEDURE — 99215 OFFICE O/P EST HI 40 MIN: CPT | Performed by: NURSE PRACTITIONER

## 2023-12-26 PROCEDURE — 96413 CHEMO IV INFUSION 1 HR: CPT

## 2023-12-26 PROCEDURE — 80053 COMPREHEN METABOLIC PANEL: CPT

## 2023-12-26 PROCEDURE — 85025 COMPLETE CBC W/AUTO DIFF WBC: CPT

## 2023-12-26 RX ORDER — POLYMYXIN B SULFATE AND TRIMETHOPRIM 1; 10000 MG/ML; [USP'U]/ML
1 SOLUTION OPHTHALMIC EVERY 6 HOURS
Qty: 1 EACH | Refills: 0 | Status: SHIPPED | OUTPATIENT
Start: 2023-12-26

## 2023-12-26 NOTE — PROGRESS NOTES
Pt here for C11D1 Drug(s) trastuzumab. Arrives Ambulating independently, accompanied by Spouse     Patient was evaluated today by GORDY. Oral medications included in this regimen:  no    Patient confirms comprehension of cancer treatment schedule:  yes    Pregnancy screening:  Not applicable    Modifications in dose or schedule:  No    Medications appearance and physical integrity checked by RN: yes. Chemotherapy IV pump settings verified by 2 RNs:  No due to targeted therapy IV administration.   Frequency of blood return and site check throughout administration: Prior to administration, Prior to each drug, and At completion of therapy     Infusion/treatment outcome:  patient tolerated treatment without incident    Education Record    Learner:  Patient and Spouse  Barriers / Limitations:  None  Method:  Discussion and Printed material  Education / instructions given:  plan of care, next appts  Outcome:  Shows understanding    Discharged Home, Ambulating independently, accompanied by:Spouse    Patient/family verbalized understanding of future appointments: by printed AVS

## 2023-12-26 NOTE — PROGRESS NOTES
Patient is here for cycle 11 of Trastuzumab. Feeling well. Denies any concerns at present time. Mild bumpy rash on her face. She is applying hydrocortisone cream daily. Eyes slightly itchy today.      Education Record    Learner:  Patient    Disease / Diagnosis:  Trastuzumab     Barriers / Limitations:  None   Comments:    Method:  Discussion   Comments:    General Topics:  Plan of care reviewed   Comments:    Outcome:  Shows understanding   Comments:

## 2023-12-26 NOTE — TELEPHONE ENCOUNTER
[address] [phone] We need clarification on the following medication: polymyxin B-trimethoprim 45811-5.1 UNIT/ML-% Ophthalmic Solution.  Thanks [employer]

## 2024-01-12 NOTE — PROGRESS NOTES
Cancer Center Progress Note    Patient Name: Fabienne Rodriguez   YOB: 1970   Medical Record Number: [id number]   CSN: [id number]   Date of visit: 1/15/2024      Chief Complaint/Reason for Visit:  Chief Complaint   Patient presents with    Follow - Up    Chemotherapy      Oncologic History:  9/2022: patient noted bloody nipple discharge ~2 weeks then stopped     11/2022: mammogram at [facility] revealed an abnormality in the left breast behind the nipple, a biopsy was recommended, but patient had to travel to [facility] for family reasons.      3/24/2023: ultrasound of the retroareolar left breast demonstrated focally dilated ducts with questionable intraluminal soft tissue. A focal lesion at 11 o'clock in that area measuring 0.4 x 0.4 x 0.3 cm. Additional focally dilated duct at 12 o'clock in the left breast measuring 0.5 x 0.3 cm. US guided biopsy of these lesions showed DCIS with multiple foci of at least microinvasion, largest focus of microinvasion measuring 0.9 mm (0.09 cm). ER negative, AL negative, HER2 negative, Ki-67 40%.   Largest focus of DCIS measures 16 mm (1.6 cm), grade 3, solid, cribriform and comedo types, with involvement of sclerosing lesion.     4/6/2023: MRI breast showed enhancement in the retroareolar left breast spans an area of 1.9 x 1.9 x 5.1 cm and corresponds with known DCIS. Area of enhancement in the retroareolar right breast measuring 0.7 x 0.8 x 0.5 cm which MR guided biopsy is recommended.   Mildly prominent bilateral axillary lymph nodes.  These are nonspecific but may be reactive in nature since they are symmetric.      4/14/2023: MRI guided right breast retroareolar biopsy showed fibroadenoma. Negative for malignancy.      5/5/2023: left breast central lumpectomy, surgical margins x5, and sentinel lymph node biopsy with Dr. Heaton.   Final path showed 6 mm of invasive ductal carcinoma, grade 3, ER negative, AL negative, HER2 (2+) amplified by FISH, Ki-67 high 80-90%.  3 sentinel lymph nodes all negative for carcinoma. All margins negative for invasive carcinoma. DCIS spanning an area 2.5 cm, grade 3, all margins negative for DCIS but close superior margin <1 mm.      Adjuvant TH  23: C1 TH   23: C2 TH  23: C3 TH  23: C4 TH  23: C5  Herceptin  23: C6 Herceptin  10/3/23: C7 Herceptin, completed RT   10/23/23: C8 Herceptin  23: C9 Herceptin  23: C10 Herceptin   23: C11 Herceptin  1/15/24: C12 Herceptin    History of Present Illness:   Fabienne Rodriguez is a 53 year old patient of Dr. Treviño's with breast cancer as above. She is receiving maintenance herceptin. She presents today for consideration of her next treatment.   She is doing well overall, no new concerns. Tolerating treatment without toxicity. She continues to have occasional breast pain, has not needed any medication for this.   No fevers or recent infections, no sob or cp  No rash   Denies any n/v/d, appetite has been well       Problem List:  Patient Active Problem List   Diagnosis    Malignant neoplasm of central portion of left breast in female, estrogen receptor negative  (HCC)    Encounter for monitoring cardiotoxic drug therapy    Anemia due to chronic kidney disease    Iron deficiency anemia due to chronic blood loss        Medical History:  Past Medical History:   Diagnosis Date    Cancer (HCC)     BREAST CANCER,LEFT    High blood pressure     High cholesterol     Hyperlipidemia     Hypertension     Wears dentures        Surgical History:  Past Surgical History:   Procedure Laterality Date    BREAST BIOPSY  2023          x 2    CHEMOTHERAPY      LUMPECTOMY LEFT  2023    invasive ductal    NEEDLE BIOPSY LEFT  2023    DCIS, invasive ductal in lumpex specimen    NEEDLE BIOPSY LEFT  2023    MRbx, fibroadenoma    RADIATION LEFT         Allergies:  No Known Allergies    Family History:  Family History   Problem Relation Age of Onset    Breast Cancer  Self        Social History:  Social History     Socioeconomic History    Marital status:      Spouse name: Not on file    Number of children: 2    Years of education: Not on file    Highest education level: Not on file   Occupational History    Not on file   Tobacco Use    Smoking status: Never    Smokeless tobacco: Never   Vaping Use    Vaping Use: Never used   Substance and Sexual Activity    Alcohol use: Yes     Comment: occasional wine    Drug use: Never    Sexual activity: Not on file   Other Topics Concern    Not on file   Social History Narrative     - lives with  and child    2 children     Social Determinants of Health     Financial Resource Strain: Not on file   Food Insecurity: Not on file   Transportation Needs: Not on file   Physical Activity: Not on file   Stress: Not on file   Social Connections: Not on file   Housing Stability: Not on file       Medications:    Current Outpatient Medications:     polymyxin B-trimethoprim 90938-2.1 UNIT/ML-% Ophthalmic Solution, Place 1 drop into both eyes every 6 (six) hours. Place 1-2 drops into both eyes four times a day for 5 days, Disp: 1 each, Rfl: 0    hydrocortisone 2.5 % External Cream, Apply 1 Application topically 2 (two) times daily. For facial rash, Disp: 20 g, Rfl: 0    atenolol 50 MG Oral Tab, Take 1 tablet (50 mg total) by mouth daily., Disp: , Rfl:     atorvastatin 20 MG Oral Tab, Take 1 tablet (20 mg total) by mouth daily., Disp: , Rfl:     Ferrous Sulfate (IRON) 325 (65 Fe) MG Oral Tab, Take by mouth daily., Disp: , Rfl:     Potassium Chloride ER 10 MEQ Oral Tab CR, Take 1 tablet (10 mEq total) by mouth 2 (two) times daily. (Patient not taking: Reported on 12/19/2023), Disp: 60 tablet, Rfl: 0    Review of Systems:  A comprehensive 14 point review of systems was completed.  Pertinent positives and negatives noted in the HPI.    Performance Status: ECOG 0 - Fully active, able to carry on all predisease activities without  restrictions.     Physical Examination:  Vital Signs: Height: 168.2 cm (5' 6.22\") (01/15 0849)  Weight: 84.4 kg (186 lb) (01/15 0849)  BSA (Calculated - sq m): 1.94 sq meters (01/15 0849)  Pulse: 62 (01/15 0849)  BP: 149/89 (01/15 0849)  Temp: 97.8 °F (36.6 °C) (01/15 0849)  Do Not Use - Resp Rate: --  SpO2: 97 % (01/15 0849)    GENERAL:  Well developed, well nourished, no acute distress   NECK:  Supple; central trachea; no masses  RESPIRATORY: Effortless breathing; symmetric expansion and BS. No crackles or wheezes.   CARDIOVASCULAR: Regular, normal rate, S1, S2 normal, no peripheral edema   ABDOMEN: Soft, non-distended, nontender, no hepatosplenomegaly, BS normal  MUSCULOSKELETAL: No deformities or joint swelling, normal gait  EXTREMITIES: no limb erythema or edema; No cyanosis  NEUROLOGIC: Alert and oriented x 3; appropriate affect; no focal deficits   SKIN: No rash, petechiae or purpura; no jaundice  PSYCH: mood and affect appropriate      Labs:     Recent Results (from the past 72 hour(s))   Comp Metabolic Panel (14)    Collection Time: 01/15/24  8:57 AM   Result Value Ref Range    Glucose 118 (H) 70 - 99 mg/dL    Sodium 140 136 - 145 mmol/L    Potassium 3.8 3.5 - 5.1 mmol/L    Chloride 109 98 - 112 mmol/L    CO2 27.0 21.0 - 32.0 mmol/L    Anion Gap 4 0 - 18 mmol/L    BUN 11 9 - 23 mg/dL    Creatinine 0.67 0.55 - 1.02 mg/dL    Calcium, Total 9.2 8.5 - 10.1 mg/dL    Calculated Osmolality 290 275 - 295 mOsm/kg    eGFR-Cr 104 >=60 mL/min/1.73m2    AST 28 15 - 37 U/L    ALT 40 13 - 56 U/L    Alkaline Phosphatase 62 41 - 108 U/L    Bilirubin, Total 0.3 0.1 - 2.0 mg/dL    Total Protein 8.0 6.4 - 8.2 g/dL    Albumin 3.1 (L) 3.4 - 5.0 g/dL    Globulin  4.9 (H) 2.8 - 4.4 g/dL    A/G Ratio 0.6 (L) 1.0 - 2.0    Patient Fasting for CMP? No    CBC W/ DIFFERENTIAL    Collection Time: 01/15/24  8:57 AM   Result Value Ref Range    WBC 2.7 (L) 4.0 - 11.0 x10(3) uL    RBC 3.96 3.80 - 5.30 x10(6)uL    HGB 11.7 (L) 12.0 - 16.0  g/dL    HCT 35.7 35.0 - 48.0 %    .0 150.0 - 450.0 10(3)uL    MCV 90.2 80.0 - 100.0 fL    MCH 29.5 26.0 - 34.0 pg    MCHC 32.8 31.0 - 37.0 g/dL    RDW 12.4 %    Neutrophil Absolute Prelim 1.14 (L) 1.50 - 7.70 x10 (3) uL    Neutrophil Absolute 1.14 (L) 1.50 - 7.70 x10(3) uL    Lymphocyte Absolute 1.20 1.00 - 4.00 x10(3) uL    Monocyte Absolute 0.28 0.10 - 1.00 x10(3) uL    Eosinophil Absolute 0.06 0.00 - 0.70 x10(3) uL    Basophil Absolute 0.02 0.00 - 0.20 x10(3) uL    Immature Granulocyte Absolute 0.00 0.00 - 1.00 x10(3) uL    Neutrophil % 42.3 %    Lymphocyte % 44.4 %    Monocyte % 10.4 %    Eosinophil % 2.2 %    Basophil % 0.7 %    Immature Granulocyte % 0.0 %         Impression/Plan    Left breast cancer:  S/p left lumpectomy and SLNB 5/5/23  S/p 12 weeks of weekly taxol + herceptin  Completed adjuvant RT  Now on maintenance herceptin q3 weeks, plan to complete 1 year. Tolerating well.   Last echo was in September - due again in January - scheduled  Labs reviewed, will proceed with treatment today as planned.   Follow up with plastic surgeon as recommended to discuss reconstruction surgery -scheduled.         Planned Follow Up: 3 weeks with Dr. Treviño, sooner if needed.      Risk Level: HIGH, breast cancer, receiving chemotherapy requiring close monitoring     The 21st Century Cures Act makes medical notes like these available to patients in the interest of transparency. Please be advised this is a medical document. Medical documents are intended to carry relevant information, facts as evident, and the clinical opinion of the practitioner. The medical note is intended as peer to peer communication and may appear blunt or direct. It is written in medical language and may contain abbreviations or verbiage that are unfamiliar.     Electronically Signed by:    Belinda DE ANDA, ROMANA-BC  Nurse Practitioner  [facility]

## 2024-01-15 ENCOUNTER — OFFICE VISIT (OUTPATIENT)
Dept: HEMATOLOGY/ONCOLOGY | Facility: HOSPITAL | Age: 54
End: 2024-01-15
Attending: INTERNAL MEDICINE
Payer: COMMERCIAL

## 2024-01-15 VITALS
HEIGHT: 66.22 IN | HEART RATE: 62 BPM | WEIGHT: 186 LBS | BODY MASS INDEX: 29.89 KG/M2 | TEMPERATURE: 98 F | DIASTOLIC BLOOD PRESSURE: 89 MMHG | SYSTOLIC BLOOD PRESSURE: 149 MMHG | RESPIRATION RATE: 16 BRPM | OXYGEN SATURATION: 97 %

## 2024-01-15 DIAGNOSIS — Z17.1 MALIGNANT NEOPLASM OF CENTRAL PORTION OF LEFT BREAST IN FEMALE, ESTROGEN RECEPTOR NEGATIVE  (HCC): Primary | ICD-10-CM

## 2024-01-15 DIAGNOSIS — C50.112 MALIGNANT NEOPLASM OF CENTRAL PORTION OF LEFT BREAST IN FEMALE, ESTROGEN RECEPTOR NEGATIVE  (HCC): Primary | ICD-10-CM

## 2024-01-15 DIAGNOSIS — Z79.899 ENCOUNTER FOR MONITORING CARDIOTOXIC DRUG THERAPY: Primary | ICD-10-CM

## 2024-01-15 DIAGNOSIS — Z98.890 S/P LUMPECTOMY, LEFT BREAST: ICD-10-CM

## 2024-01-15 DIAGNOSIS — Z17.1 MALIGNANT NEOPLASM OF CENTRAL PORTION OF LEFT BREAST IN FEMALE, ESTROGEN RECEPTOR NEGATIVE  (HCC): ICD-10-CM

## 2024-01-15 DIAGNOSIS — Z51.81 ENCOUNTER FOR MONITORING CARDIOTOXIC DRUG THERAPY: Primary | ICD-10-CM

## 2024-01-15 DIAGNOSIS — C50.112 MALIGNANT NEOPLASM OF CENTRAL PORTION OF LEFT BREAST IN FEMALE, ESTROGEN RECEPTOR NEGATIVE  (HCC): ICD-10-CM

## 2024-01-15 LAB
ALBUMIN SERPL-MCNC: 3.1 G/DL (ref 3.4–5)
ALBUMIN/GLOB SERPL: 0.6 {RATIO} (ref 1–2)
ALP LIVER SERPL-CCNC: 62 U/L
ALT SERPL-CCNC: 40 U/L
ANION GAP SERPL CALC-SCNC: 4 MMOL/L (ref 0–18)
AST SERPL-CCNC: 28 U/L (ref 15–37)
BASOPHILS # BLD AUTO: 0.02 X10(3) UL (ref 0–0.2)
BASOPHILS NFR BLD AUTO: 0.7 %
BILIRUB SERPL-MCNC: 0.3 MG/DL (ref 0.1–2)
BUN BLD-MCNC: 11 MG/DL (ref 9–23)
CALCIUM BLD-MCNC: 9.2 MG/DL (ref 8.5–10.1)
CHLORIDE SERPL-SCNC: 109 MMOL/L (ref 98–112)
CO2 SERPL-SCNC: 27 MMOL/L (ref 21–32)
CREAT BLD-MCNC: 0.67 MG/DL
EGFRCR SERPLBLD CKD-EPI 2021: 104 ML/MIN/1.73M2 (ref 60–?)
EOSINOPHIL # BLD AUTO: 0.06 X10(3) UL (ref 0–0.7)
EOSINOPHIL NFR BLD AUTO: 2.2 %
ERYTHROCYTE [DISTWIDTH] IN BLOOD BY AUTOMATED COUNT: 12.4 %
FASTING STATUS PATIENT QL REPORTED: NO
GLOBULIN PLAS-MCNC: 4.9 G/DL (ref 2.8–4.4)
GLUCOSE BLD-MCNC: 118 MG/DL (ref 70–99)
HCT VFR BLD AUTO: 35.7 %
HGB BLD-MCNC: 11.7 G/DL
IMM GRANULOCYTES # BLD AUTO: 0 X10(3) UL (ref 0–1)
IMM GRANULOCYTES NFR BLD: 0 %
LYMPHOCYTES # BLD AUTO: 1.2 X10(3) UL (ref 1–4)
LYMPHOCYTES NFR BLD AUTO: 44.4 %
MCH RBC QN AUTO: 29.5 PG (ref 26–34)
MCHC RBC AUTO-ENTMCNC: 32.8 G/DL (ref 31–37)
MCV RBC AUTO: 90.2 FL
MONOCYTES # BLD AUTO: 0.28 X10(3) UL (ref 0.1–1)
MONOCYTES NFR BLD AUTO: 10.4 %
NEUTROPHILS # BLD AUTO: 1.14 X10 (3) UL (ref 1.5–7.7)
NEUTROPHILS # BLD AUTO: 1.14 X10(3) UL (ref 1.5–7.7)
NEUTROPHILS NFR BLD AUTO: 42.3 %
OSMOLALITY SERPL CALC.SUM OF ELEC: 290 MOSM/KG (ref 275–295)
PLATELET # BLD AUTO: 214 10(3)UL (ref 150–450)
POTASSIUM SERPL-SCNC: 3.8 MMOL/L (ref 3.5–5.1)
PROT SERPL-MCNC: 8 G/DL (ref 6.4–8.2)
RBC # BLD AUTO: 3.96 X10(6)UL
SODIUM SERPL-SCNC: 140 MMOL/L (ref 136–145)
WBC # BLD AUTO: 2.7 X10(3) UL (ref 4–11)

## 2024-01-15 PROCEDURE — 80053 COMPREHEN METABOLIC PANEL: CPT

## 2024-01-15 PROCEDURE — 85025 COMPLETE CBC W/AUTO DIFF WBC: CPT

## 2024-01-15 PROCEDURE — 96413 CHEMO IV INFUSION 1 HR: CPT

## 2024-01-15 PROCEDURE — 99215 OFFICE O/P EST HI 40 MIN: CPT | Performed by: NURSE PRACTITIONER

## 2024-01-17 ENCOUNTER — HOSPITAL ENCOUNTER (OUTPATIENT)
Dept: RADIATION ONCOLOGY | Facility: HOSPITAL | Age: 54
Discharge: HOME | End: 2024-01-17
Attending: RADIOLOGY
Payer: COMMERCIAL

## 2024-01-17 VITALS
TEMPERATURE: 98 F | BODY MASS INDEX: 30 KG/M2 | SYSTOLIC BLOOD PRESSURE: 132 MMHG | RESPIRATION RATE: 18 BRPM | OXYGEN SATURATION: 97 % | DIASTOLIC BLOOD PRESSURE: 87 MMHG | HEART RATE: 60 BPM | WEIGHT: 186.19 LBS

## 2024-01-17 DIAGNOSIS — C50.112 MALIGNANT NEOPLASM OF CENTRAL PORTION OF LEFT BREAST IN FEMALE, ESTROGEN RECEPTOR NEGATIVE  (HCC): Primary | ICD-10-CM

## 2024-01-17 DIAGNOSIS — Z17.1 MALIGNANT NEOPLASM OF CENTRAL PORTION OF LEFT BREAST IN FEMALE, ESTROGEN RECEPTOR NEGATIVE  (HCC): Primary | ICD-10-CM

## 2024-01-17 PROCEDURE — 99213 OFFICE O/P EST LOW 20 MIN: CPT

## 2024-01-17 NOTE — PROGRESS NOTES
[facility]  RADIATION ONCOLOGY   FOLLOW UP     Fabienne Rodriguez  8/29/1970    DIAGNOSIS: L breast cancer      CANCER HISTORY   -presented w/ bloody nipple discharge  -s/p central lumpectomy and SNBx  -6 mm grade 3 IDC, ER negative ME negative Ki-67 up to 90% HER2 2+ by IHC and amplified by FISH, with over 2.5 cm of associated high-grade DCIS  -DCIS is less than 1 mm from the superior shave margin; invasive disease has a wider margin  -All 3 sentinel lymph nodes are negative for carcinoma    -had TH  -40 Gy plus 10 Gy boost to L breast, Oct 2023    INTERIM HISTORY   Here for f/u doing well  Occasional discomfort in breast  No bothersome swelling/hardening    Getting Herceptin still  Mammogram looked ok, showing post treatment changes    EXAM   +pigmentation  No desquamation  No erythema  +skin thickening  +mild edema  No adenopathy    IMPRESSION/PLAN   Doing well    F/u with med onc and breast surgeon only      Anton Hernandez MD  Radiation Oncology  [email or web address]  [phone]    20 minutes were spent with the patient/family, more than 50 percent on counseling/coordination of care (discuss disease status, management of any side effects, future follow up plans)

## 2024-01-17 NOTE — PROGRESS NOTES
Nursing Follow-Up Note    Patient: Fabienne Rodriguez  YOB: 1970  Age: 53 year old  Radiation Oncologist: Dr. Anton Hernandez  Referring Physician: No ref. provider found  Chief Complaint:   Chief Complaint   Patient presents with    Follow - Up     Date: 1/17/2024    Toxicities: n/a    Vital Signs: /87 (BP Location: Right arm, Patient Position: Sitting, Cuff Size: large)   Pulse 60   Temp 98.2 °F (36.8 °C) (Tympanic)   Resp 18   Wt 84.5 kg (186 lb 3.2 oz)   LMP 05/15/2023 (Exact Date)   SpO2 97%   BMI 29.85 kg/m² ,   Wt Readings from Last 6 Encounters:   01/17/24 84.5 kg (186 lb 3.2 oz)   01/15/24 84.4 kg (186 lb)   12/26/23 83.9 kg (185 lb)   12/19/23 82.6 kg (182 lb)   12/04/23 82.5 kg (181 lb 12.8 oz)   11/13/23 83.4 kg (183 lb 12.8 oz)       Allergies:  No Known Allergies    Nursing Note: Hx of L breast cancer. S/p lumpectomy and SLNB 5/5/23. Completed adjuvant Taxol and Herceptin. Continues with maintenance Herceptin. Completed RT to L breast on 10/3/23. Here for follow up today. Had mammogram 11/29/23. Has mammogram ordered for May 2024. Pt feels well today. Skin with some hyperpigmentation. No open areas or pruritus. Continues to moisturize. Has occasional \"zingers\". Not needing any pain medicine.

## 2024-01-17 NOTE — PATIENT INSTRUCTIONS
-  FOLLOW-UP WITH DR. GU ONLY IF NEEDED      - CALL THE NURSES' LINE AT [phone] IF YOU HAVE ANY QUESTIONS/CONCERNS REGARDING RADIATION THERAPY

## 2024-01-24 ENCOUNTER — HOSPITAL ENCOUNTER (OUTPATIENT)
Dept: CV DIAGNOSTICS | Facility: HOSPITAL | Age: 54
Discharge: HOME | End: 2024-01-24
Attending: NURSE PRACTITIONER
Payer: COMMERCIAL

## 2024-01-24 DIAGNOSIS — Z51.81 ENCOUNTER FOR MONITORING CARDIOTOXIC DRUG THERAPY: ICD-10-CM

## 2024-01-24 DIAGNOSIS — Z17.1 MALIGNANT NEOPLASM OF CENTRAL PORTION OF LEFT BREAST IN FEMALE, ESTROGEN RECEPTOR NEGATIVE  (HCC): ICD-10-CM

## 2024-01-24 DIAGNOSIS — C50.112 MALIGNANT NEOPLASM OF CENTRAL PORTION OF LEFT BREAST IN FEMALE, ESTROGEN RECEPTOR NEGATIVE  (HCC): ICD-10-CM

## 2024-01-24 DIAGNOSIS — Z79.899 ENCOUNTER FOR MONITORING CARDIOTOXIC DRUG THERAPY: ICD-10-CM

## 2024-01-24 PROCEDURE — 93306 TTE W/DOPPLER COMPLETE: CPT | Performed by: NURSE PRACTITIONER

## 2024-02-02 ENCOUNTER — OFFICE VISIT (OUTPATIENT)
Dept: HEMATOLOGY/ONCOLOGY | Facility: HOSPITAL | Age: 54
End: 2024-02-02
Attending: INTERNAL MEDICINE
Payer: COMMERCIAL

## 2024-02-02 VITALS
HEART RATE: 72 BPM | DIASTOLIC BLOOD PRESSURE: 81 MMHG | HEIGHT: 66.22 IN | OXYGEN SATURATION: 98 % | RESPIRATION RATE: 16 BRPM | WEIGHT: 183.19 LBS | TEMPERATURE: 98 F | SYSTOLIC BLOOD PRESSURE: 131 MMHG | BODY MASS INDEX: 29.44 KG/M2

## 2024-02-02 DIAGNOSIS — D70.9 NEUTROPENIA, UNSPECIFIED TYPE (HCC): ICD-10-CM

## 2024-02-02 DIAGNOSIS — Z17.1 MALIGNANT NEOPLASM OF CENTRAL PORTION OF LEFT BREAST IN FEMALE, ESTROGEN RECEPTOR NEGATIVE  (HCC): Primary | ICD-10-CM

## 2024-02-02 DIAGNOSIS — Z51.81 ENCOUNTER FOR MONITORING CARDIOTOXIC DRUG THERAPY: ICD-10-CM

## 2024-02-02 DIAGNOSIS — Z98.890 S/P LUMPECTOMY, LEFT BREAST: ICD-10-CM

## 2024-02-02 DIAGNOSIS — Z51.11 CHEMOTHERAPY MANAGEMENT, ENCOUNTER FOR: ICD-10-CM

## 2024-02-02 DIAGNOSIS — D05.12 DUCTAL CARCINOMA IN SITU (DCIS) OF LEFT BREAST: ICD-10-CM

## 2024-02-02 DIAGNOSIS — T45.1X5A CHEMOTHERAPY-INDUCED PERIPHERAL NEUROPATHY  (HCC): ICD-10-CM

## 2024-02-02 DIAGNOSIS — G62.0 CHEMOTHERAPY-INDUCED PERIPHERAL NEUROPATHY  (HCC): ICD-10-CM

## 2024-02-02 DIAGNOSIS — C50.112 MALIGNANT NEOPLASM OF CENTRAL PORTION OF LEFT BREAST IN FEMALE, ESTROGEN RECEPTOR NEGATIVE  (HCC): Primary | ICD-10-CM

## 2024-02-02 DIAGNOSIS — Z79.899 ENCOUNTER FOR MONITORING CARDIOTOXIC DRUG THERAPY: ICD-10-CM

## 2024-02-02 DIAGNOSIS — D50.0 IRON DEFICIENCY ANEMIA DUE TO CHRONIC BLOOD LOSS: ICD-10-CM

## 2024-02-02 PROCEDURE — 99215 OFFICE O/P EST HI 40 MIN: CPT | Performed by: INTERNAL MEDICINE

## 2024-02-02 RX ORDER — MULTIVITAMIN
TABLET ORAL DAILY
COMMUNITY

## 2024-02-02 NOTE — PROGRESS NOTES
Hematology Oncology Follow-up Note    Patient Name: Fabienne Rodriguez   YOB: 1970   Medical Record Number: [id number]   CSN: [id number]   Attending Physician: Brittany Treviño MD     Date of Visit: 02/02/24     Chief Complaint: Follow up, chemo    Diagnosis: Left breast invasive ductal carcinoma with extensive DCIS, ER-, OH-, HER2 positive, Ki-76 high, pT1bN0    Oncologic History:  9/2022: patient noted bloody nipple discharge ~2 weeks then stopped    11/2022: mammogram at [facility] revealed an abnormality in the left breast behind the nipple, a biopsy was recommended, but patient had to travel to [facility] for family reasons.     3/24/2023: ultrasound of the retroareolar left breast demonstrated focally dilated ducts with questionable intraluminal soft tissue. A focal lesion at 11 o'clock in that area measuring 0.4 x 0.4 x 0.3 cm. Additional focally dilated duct at 12 o'clock in the left breast measuring 0.5 x 0.3 cm. US guided biopsy of these lesions showed DCIS with multiple foci of at least microinvasion, largest focus of microinvasion measuring 0.9 mm (0.09 cm). ER negative, OH negative, HER2 negative, Ki-67 40%.   Largest focus of DCIS measures 16 mm (1.6 cm), grade 3, solid, cribriform and comedo types, with involvement of sclerosing lesion.    4/6/2023: MRI breast showed enhancement in the retroareolar left breast spans an area of 1.9 x 1.9 x 5.1 cm and corresponds with known DCIS. Area of enhancement in the retroareolar right breast measuring 0.7 x 0.8 x 0.5 cm which MR guided biopsy is recommended.   Mildly prominent bilateral axillary lymph nodes.  These are nonspecific but may be reactive in nature since they are symmetric.     4/14/2023: MRI guided right breast retroareolar biopsy showed fibroadenoma. Negative for malignancy.     5/5/2023: left breast central lumpectomy, surgical margins x5, and sentinel lymph node biopsy with Dr. Heaton.   Final path showed 6 mm of invasive ductal  carcinoma, grade 3, ER negative, NE negative, HER2 (2+) amplified by FISH, Ki-67 high 80-90%. 3 sentinel lymph nodes all negative for carcinoma. All margins negative for invasive carcinoma. DCIS spanning an area 2.5 cm, grade 3, all margins negative for DCIS but close superior margin <1 mm.     Adjuvant TH  23: C1 TH   23: C2 TH  23: C3 TH  23: C4 TH  23: C5  Herceptin  23: C6 Herceptin  10/3/23: C7 Herceptin, completed RT   10/23/23: C8 Herceptin  23: C9 Herceptin  23: C10 Herceptin   23: C11 Herceptin  1/15/24: C12 Herceptin      Interval History:  Here for follow up and evaluation prior to next chemo.   She is on maintenance herceptin q3 wks at [facility], completed 12 cycles and due for C13 on .   She continues to do well and tolerating therapy pretty well.   TTE done last week was stable/normal.   She lost her mother last November and going to [facility] next week for the . She will be staying there for 6 weeks.   Denies any pain, fatigue, weight loss. No dyspnea, fever, nausea, tingling, numbness. No infections.   No breast changes. Planning to see plastics for consideration left nipple reconstruction.     Performance Status: ECOG 0     Current Medications:    Current Outpatient Medications:     Multiple Vitamin (MULTI-VITAMIN DAILY) Oral Tab, Take by mouth daily., Disp: , Rfl:     polymyxin B-trimethoprim 56151-3.1 UNIT/ML-% Ophthalmic Solution, Place 1 drop into both eyes every 6 (six) hours. Place 1-2 drops into both eyes four times a day for 5 days, Disp: 1 each, Rfl: 0    hydrocortisone 2.5 % External Cream, Apply 1 Application topically 2 (two) times daily. For facial rash, Disp: 20 g, Rfl: 0    atenolol 50 MG Oral Tab, Take 1 tablet (50 mg total) by mouth daily., Disp: , Rfl:     atorvastatin 20 MG Oral Tab, Take 1 tablet (20 mg total) by mouth daily., Disp: , Rfl:     Ferrous Sulfate (IRON) 325 (65 Fe) MG Oral Tab, Take by mouth daily., Disp: ,  Rfl:        Review of Systems:  10 -point systems reviewed, all negative except as mentioned in the HPI/interval history.     Vital Signs:      1/17/2024     9:33 AM 2/2/2024     8:48 AM   Vitals History   /87 131/81   BP Location Right arm Right arm   Patient Position Sitting Sitting   Cuff Size large adult   Pulse 60 72   Resp 18 16   Temp 98.2 °F (36.8 °C) 98.3 °F (36.8 °C)   SpO2 97 % 98 %   Weight  183 lbs 3 oz   Height  1.682 m (5' 6.22\")   BMI  29.37 kg/m2         Physical Examination:  General: Aert and oriented x 3, not in acute distress.  Psych: Appropriate mood and affect.   Neck: No palpable lymphadenopathy.  Breast: Left breast central lumpectomy (absent nipple) with post RT changes. Skin intact. No palpable masses or tenderness. No axillary lymphadenopathy bilaterally. R breast with no palpable masses.   Chest: Clear to auscultation.  Heart: Regular rate and rhythm. S1S2 normal.  Abdomen: Soft, non tender with good bowel sounds.  Extremities: No peripheral edema. Nail discoloration improving.   Neurological: Grossly intact.     Laboratory:  Lab Results   Component Value Date    WBC 2.7 (L) 01/15/2024    RBC 3.96 01/15/2024    HGB 11.7 (L) 01/15/2024    HCT 35.7 01/15/2024    MCV 90.2 01/15/2024    MCH 29.5 01/15/2024    MCHC 32.8 01/15/2024    RDW 12.4 01/15/2024    .0 01/15/2024     Lab Results   Component Value Date     01/15/2024    K 3.8 01/15/2024     01/15/2024    CO2 27.0 01/15/2024    BUN 11 01/15/2024    CREATSERUM 0.67 01/15/2024     (H) 01/15/2024    CA 9.2 01/15/2024    ALKPHO 62 01/15/2024    ALT 40 01/15/2024    AST 28 01/15/2024    BILT 0.3 01/15/2024    ALB 3.1 (L) 01/15/2024    TP 8.0 01/15/2024     Lab Results   Component Value Date/Time    JAVIER 190.8 11/13/2023 10:31 AM    JAVIER 42.0 07/03/2023 11:32 AM     Lab Results   Component Value Date/Time    SAT 41 11/13/2023 10:31 AM    SAT 25 07/03/2023 11:32 AM     Radiology:  No results  found.      Impression:    Left breast invasive ductal carcinoma with extensive DCIS, grade 3, ER/WI negative, HER2 positive, Ki-67 high, pT1bN0:  - s/p left lumpectomy and SNB 5/5/23; pT1bN0. all margins negative for invasive carcinoma and DCIS, but had close DCIS superior margin <1 mm. re-excision was not recommended.   - s/p 12 weeks of adjuvant TH and adjuvant XRT.   - now on maintenance herceptin q3 weeks. Plan to complete 1 yr  - s/p 12 cycles of herceptin. Tolerating well without toxicities.   - TTE in 1/2024 normal.    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  - s/p venofer 200 mg x5 with chemo. 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   - on B complex. No need for meds.     Plan:  - proceed with C13D1 herceptin on 2/5/24 at [place].   - ok to reschedule next cycle to 3/25/24 at [place] due to patient's travel to [facility] x6 weeks.   - left diagnostic mammogram in 5/2024   - f/u with surgery and plastics as directed.   - repeat TTE in 5/2024  - RTC 3/25/24 for APN + C14 herceptin at [place]  - RTC 4/12/24 for MD at [place]      HIGH - breast cancer receiving chemotherapy with systemic effects requiring intervention and close monitoring   Brittany Treviño MD   Hematology Oncology   [facility]

## 2024-02-05 ENCOUNTER — APPOINTMENT (OUTPATIENT)
Dept: HEMATOLOGY/ONCOLOGY | Facility: HOSPITAL | Age: 54
End: 2024-02-05
Attending: INTERNAL MEDICINE
Payer: COMMERCIAL

## 2024-02-05 ENCOUNTER — APPOINTMENT (OUTPATIENT)
Dept: HEMATOLOGY/ONCOLOGY | Age: 54
End: 2024-02-05
Attending: INTERNAL MEDICINE
Payer: COMMERCIAL

## 2024-02-05 VITALS
SYSTOLIC BLOOD PRESSURE: 139 MMHG | WEIGHT: 183 LBS | OXYGEN SATURATION: 99 % | DIASTOLIC BLOOD PRESSURE: 82 MMHG | TEMPERATURE: 98 F | RESPIRATION RATE: 16 BRPM | HEIGHT: 66.22 IN | BODY MASS INDEX: 29.41 KG/M2 | HEART RATE: 71 BPM

## 2024-02-05 DIAGNOSIS — C50.112 MALIGNANT NEOPLASM OF CENTRAL PORTION OF LEFT BREAST IN FEMALE, ESTROGEN RECEPTOR NEGATIVE  (HCC): Primary | ICD-10-CM

## 2024-02-05 DIAGNOSIS — Z17.1 MALIGNANT NEOPLASM OF CENTRAL PORTION OF LEFT BREAST IN FEMALE, ESTROGEN RECEPTOR NEGATIVE  (HCC): Primary | ICD-10-CM

## 2024-02-05 LAB
ALBUMIN SERPL-MCNC: 3.6 G/DL (ref 3.4–5)
ALBUMIN/GLOB SERPL: 0.6 {RATIO} (ref 1–2)
ALP LIVER SERPL-CCNC: 68 U/L
ALT SERPL-CCNC: 40 U/L
ANION GAP SERPL CALC-SCNC: 3 MMOL/L (ref 0–18)
AST SERPL-CCNC: 33 U/L (ref 15–37)
BASOPHILS # BLD AUTO: 0.02 X10(3) UL (ref 0–0.2)
BASOPHILS NFR BLD AUTO: 0.7 %
BILIRUB SERPL-MCNC: 0.4 MG/DL (ref 0.1–2)
BUN BLD-MCNC: 9 MG/DL (ref 9–23)
CALCIUM BLD-MCNC: 9.7 MG/DL (ref 8.5–10.1)
CHLORIDE SERPL-SCNC: 105 MMOL/L (ref 98–112)
CO2 SERPL-SCNC: 29 MMOL/L (ref 21–32)
CREAT BLD-MCNC: 0.71 MG/DL
EGFRCR SERPLBLD CKD-EPI 2021: 102 ML/MIN/1.73M2 (ref 60–?)
EOSINOPHIL # BLD AUTO: 0.06 X10(3) UL (ref 0–0.7)
EOSINOPHIL NFR BLD AUTO: 2 %
ERYTHROCYTE [DISTWIDTH] IN BLOOD BY AUTOMATED COUNT: 12.5 %
FASTING STATUS PATIENT QL REPORTED: NO
GLOBULIN PLAS-MCNC: 5.6 G/DL (ref 2.8–4.4)
GLUCOSE BLD-MCNC: 118 MG/DL (ref 70–99)
HCT VFR BLD AUTO: 37.3 %
HGB BLD-MCNC: 12.7 G/DL
IMM GRANULOCYTES # BLD AUTO: 0.01 X10(3) UL (ref 0–1)
IMM GRANULOCYTES NFR BLD: 0.3 %
LYMPHOCYTES # BLD AUTO: 1.32 X10(3) UL (ref 1–4)
LYMPHOCYTES NFR BLD AUTO: 44 %
MCH RBC QN AUTO: 30.7 PG (ref 26–34)
MCHC RBC AUTO-ENTMCNC: 34 G/DL (ref 31–37)
MCV RBC AUTO: 90.1 FL
MONOCYTES # BLD AUTO: 0.3 X10(3) UL (ref 0.1–1)
MONOCYTES NFR BLD AUTO: 10 %
NEUTROPHILS # BLD AUTO: 1.29 X10 (3) UL (ref 1.5–7.7)
NEUTROPHILS # BLD AUTO: 1.29 X10(3) UL (ref 1.5–7.7)
NEUTROPHILS NFR BLD AUTO: 43 %
OSMOLALITY SERPL CALC.SUM OF ELEC: 284 MOSM/KG (ref 275–295)
PLATELET # BLD AUTO: 217 10(3)UL (ref 150–450)
POTASSIUM SERPL-SCNC: 3.9 MMOL/L (ref 3.5–5.1)
PROT SERPL-MCNC: 9.2 G/DL (ref 6.4–8.2)
RBC # BLD AUTO: 4.14 X10(6)UL
SODIUM SERPL-SCNC: 137 MMOL/L (ref 136–145)
WBC # BLD AUTO: 3 X10(3) UL (ref 4–11)

## 2024-02-05 PROCEDURE — 85025 COMPLETE CBC W/AUTO DIFF WBC: CPT

## 2024-02-05 PROCEDURE — 96413 CHEMO IV INFUSION 1 HR: CPT

## 2024-02-05 PROCEDURE — 80053 COMPREHEN METABOLIC PANEL: CPT

## 2024-02-05 NOTE — PROGRESS NOTES
Pt here for C13D1 Drug(s)Trastuzumab.  Arrives Ambulating independently, accompanied by Self     Patient was evaluated today by Treatment Nurse.    Oral medications included in this regimen:  no    Patient confirms comprehension of cancer treatment schedule:  yes    Pregnancy screening:  Denies possibility of pregnancy    Modifications in dose or schedule:  No    Medications appearance and physical integrity checked by RN: yes.    Chemotherapy IV pump settings verified by 2 RNs:  No due to targeted therapy IV administration.  Frequency of blood return and site check throughout administration: Prior to administration and at the end of treatment    Infusion/treatment outcome:  patient tolerated treatment without incident    Education Record    Learner:  Patient  Barriers / Limitations:  None  Method:  Brief focused and Reinforcement  Education / instructions given:  Plan of care reviewed.  Outcome:  Shows understanding    Discharged Home, Ambulating independently, accompanied by:Self    Patient/family verbalized understanding of future appointments: by printed AVS

## 2024-03-22 NOTE — PROGRESS NOTES
Cancer Center Progress Note    Patient Name: Fabienne Rodriguez   YOB: 1970   Medical Record Number: [id number]   CSN: [id number]   Date of visit: 3/25/24      Chief Complaint/Reason for Visit:  Chief Complaint   Patient presents with    Follow - Up    Chemotherapy      Oncologic History:  9/2022: patient noted bloody nipple discharge ~2 weeks then stopped     11/2022: mammogram at [facility] revealed an abnormality in the left breast behind the nipple, a biopsy was recommended, but patient had to travel to [facility] for family reasons.      3/24/2023: ultrasound of the retroareolar left breast demonstrated focally dilated ducts with questionable intraluminal soft tissue. A focal lesion at 11 o'clock in that area measuring 0.4 x 0.4 x 0.3 cm. Additional focally dilated duct at 12 o'clock in the left breast measuring 0.5 x 0.3 cm. US guided biopsy of these lesions showed DCIS with multiple foci of at least microinvasion, largest focus of microinvasion measuring 0.9 mm (0.09 cm). ER negative, MI negative, HER2 negative, Ki-67 40%.   Largest focus of DCIS measures 16 mm (1.6 cm), grade 3, solid, cribriform and comedo types, with involvement of sclerosing lesion.     4/6/2023: MRI breast showed enhancement in the retroareolar left breast spans an area of 1.9 x 1.9 x 5.1 cm and corresponds with known DCIS. Area of enhancement in the retroareolar right breast measuring 0.7 x 0.8 x 0.5 cm which MR guided biopsy is recommended.   Mildly prominent bilateral axillary lymph nodes.  These are nonspecific but may be reactive in nature since they are symmetric.      4/14/2023: MRI guided right breast retroareolar biopsy showed fibroadenoma. Negative for malignancy.      5/5/2023: left breast central lumpectomy, surgical margins x5, and sentinel lymph node biopsy with Dr. Heaton.   Final path showed 6 mm of invasive ductal carcinoma, grade 3, ER negative, MI negative, HER2 (2+) amplified by FISH, Ki-67 high 80-90%. 3  sentinel lymph nodes all negative for carcinoma. All margins negative for invasive carcinoma. DCIS spanning an area 2.5 cm, grade 3, all margins negative for DCIS but close superior margin <1 mm.      Adjuvant TH  5/30/23: C1 TH   6/20/23: C2 TH  7/11/23: C3 TH  8/01/23: C4 TH  8/22/23: C5  Herceptin  9/12/23: C6 Herceptin  10/3/23: C7 Herceptin, completed RT   10/23/23: C8 Herceptin  11/13/23: C9 Herceptin  12/4/23: C10 Herceptin   12/26/23: C11 Herceptin  1/15/24: C12 Herceptin  2/5/24: C13 Herceptin  3/25/24: C14 Herceptin    History of Present Illness:   Fabienne Rodriguez is a 53 year old patient of Dr. Treviño's with breast cancer as above. She is receiving maintenance herceptin. She presents today for consideration of her next treatment. She is feeling well today, no new complaints. She returned home from her trip about a week ago. She had no issues while away.   She denies any fevers or signs of infection, no sob or cp.   Denies any n/v/d  She reports new sensation changes to her right great toe and second toe that started yesterday. No other areas of neuropathy, symptoms to her hands are resolved.   No breast changes, new lumps/bumps, no nipple drainage or pian, no new LAD bilaterally.     Pt seen and examined. Labs reviewed and discussed in detail.           Problem List:  Patient Active Problem List   Diagnosis    Malignant neoplasm of central portion of left breast in female, estrogen receptor negative (HCC)    Encounter for monitoring cardiotoxic drug therapy    Anemia due to chronic kidney disease    Iron deficiency anemia due to chronic blood loss    S/P lumpectomy, left breast    Chemotherapy management, encounter for    Neutropenia (HCC)    Ductal carcinoma in situ (DCIS) of left breast    Chemotherapy-induced peripheral neuropathy (HCC)        Medical History:  Past Medical History:   Diagnosis Date    Cancer (HCC)     BREAST CANCER,LEFT    High blood pressure     High cholesterol     Hyperlipidemia      Hypertension     Wears dentures        Surgical History:  Past Surgical History:   Procedure Laterality Date    BREAST BIOPSY  2023          x 2    CHEMOTHERAPY      LUMPECTOMY LEFT  2023    invasive ductal    NEEDLE BIOPSY LEFT  2023    DCIS, invasive ductal in lumpex specimen    NEEDLE BIOPSY LEFT  2023    MRbx, fibroadenoma    RADIATION LEFT         Allergies:  No Known Allergies    Family History:  Family History   Problem Relation Age of Onset    Breast Cancer Self        Social History:  Social History     Socioeconomic History    Marital status:      Spouse name: Not on file    Number of children: 2    Years of education: Not on file    Highest education level: Not on file   Occupational History    Not on file   Tobacco Use    Smoking status: Never    Smokeless tobacco: Never   Vaping Use    Vaping Use: Never used   Substance and Sexual Activity    Alcohol use: Yes     Comment: occasional wine    Drug use: Never    Sexual activity: Not on file   Other Topics Concern    Not on file   Social History Narrative     - lives with  and child    2 children     Social Determinants of Health     Financial Resource Strain: Not on file   Food Insecurity: Not on file   Transportation Needs: Not on file   Physical Activity: Not on file   Stress: Not on file   Social Connections: Not on file   Housing Stability: Not on file       Medications:    Current Outpatient Medications:     Multiple Vitamin (MULTI-VITAMIN DAILY) Oral Tab, Take by mouth daily., Disp: , Rfl:     hydrocortisone 2.5 % External Cream, Apply 1 Application topically 2 (two) times daily. For facial rash, Disp: 20 g, Rfl: 0    atenolol 50 MG Oral Tab, Take 1 tablet (50 mg total) by mouth daily., Disp: , Rfl:     atorvastatin 20 MG Oral Tab, Take 1 tablet (20 mg total) by mouth daily., Disp: , Rfl:     Ferrous Sulfate (IRON) 325 (65 Fe) MG Oral Tab, Take by mouth daily., Disp: , Rfl:     Review of Systems:  A  comprehensive 14 point review of systems was completed.  Pertinent positives and negatives noted in the HPI.    Performance Status: ECOG 0 - Fully active, able to carry on all predisease activities without restrictions.     Physical Examination:  Vital Signs: Height: 168.2 cm (5' 6.22\") (03/25 0900)  Weight: 82.5 kg (181 lb 12.8 oz) (03/25 0900)  BSA (Calculated - sq m): 1.93 sq meters (03/25 0900)  Pulse: 80 (03/25 0900)  BP: 155/95 (03/25 0900)  Temp: 97.8 °F (36.6 °C) (03/25 0900)  Do Not Use - Resp Rate: --  SpO2: 98 % (03/25 0900)    GENERAL:  Well developed, well nourished, no acute distress  EYES: Pupils round and reactive, sclera anicteric, conjunctiva normal, no discharge, no lid lag  NECK:  Supple; central trachea; no masses  RESPIRATORY: Effortless breathing; symmetric expansion and BS. No crackles or wheezes.   CARDIOVASCULAR: Regular, normal rate, S1, S2 normal, no peripheral edema   ABDOMEN: Soft, non-distended, nontender, no hepatosplenomegaly, BS normal  MUSCULOSKELETAL: No deformities or joint swelling, normal gait  EXTREMITIES: no limb erythema or edema; No cyanosis  NEUROLOGIC: Alert and oriented x 3; appropriate affect; no focal deficits   SKIN: No rash, petechiae or purpura; no jaundice  PSYCH: mood and affect appropriate  BREAST: No breast changes, new lumps/bumps, no nipple drainage or pian, no new LAD bilaterally. Left breast surgical scar, well healed.     Labs:     Recent Results (from the past 72 hour(s))   Comp Metabolic Panel (14)    Collection Time: 03/25/24  8:59 AM   Result Value Ref Range    Glucose 114 (H) 70 - 99 mg/dL    Sodium 139 136 - 145 mmol/L    Potassium 3.9 3.5 - 5.1 mmol/L    Chloride 104 98 - 112 mmol/L    CO2 27.0 21.0 - 32.0 mmol/L    Anion Gap 8 0 - 18 mmol/L    BUN 9 9 - 23 mg/dL    Creatinine 0.73 0.55 - 1.02 mg/dL    Calcium, Total 9.4 8.5 - 10.1 mg/dL    Calculated Osmolality 288 275 - 295 mOsm/kg    eGFR-Cr 98 >=60 mL/min/1.73m2    AST 32 15 - 37 U/L    ALT 35 13  - 56 U/L    Alkaline Phosphatase 77 41 - 108 U/L    Bilirubin, Total 0.3 0.1 - 2.0 mg/dL    Total Protein 8.7 (H) 6.4 - 8.2 g/dL    Albumin 3.5 3.4 - 5.0 g/dL    Globulin  5.2 (H) 2.8 - 4.4 g/dL    A/G Ratio 0.7 (L) 1.0 - 2.0    Patient Fasting for CMP? Patient not present    CBC W/ DIFFERENTIAL    Collection Time: 03/25/24  8:59 AM   Result Value Ref Range    WBC 2.9 (L) 4.0 - 11.0 x10(3) uL    RBC 4.33 3.80 - 5.30 x10(6)uL    HGB 13.6 12.0 - 16.0 g/dL    HCT 39.4 35.0 - 48.0 %    .0 150.0 - 450.0 10(3)uL    MCV 91.0 80.0 - 100.0 fL    MCH 31.4 26.0 - 34.0 pg    MCHC 34.5 31.0 - 37.0 g/dL    RDW 12.6 %    Neutrophil Absolute Prelim 1.25 (L) 1.50 - 7.70 x10 (3) uL    Neutrophil Absolute 1.25 (L) 1.50 - 7.70 x10(3) uL    Lymphocyte Absolute 1.30 1.00 - 4.00 x10(3) uL    Monocyte Absolute 0.32 0.10 - 1.00 x10(3) uL    Eosinophil Absolute 0.05 0.00 - 0.70 x10(3) uL    Basophil Absolute 0.02 0.00 - 0.20 x10(3) uL    Immature Granulocyte Absolute 0.00 0.00 - 1.00 x10(3) uL    Neutrophil % 42.5 %    Lymphocyte % 44.2 %    Monocyte % 10.9 %    Eosinophil % 1.7 %    Basophil % 0.7 %    Immature Granulocyte % 0.0 %           Impression/Plan    Left breast cancer:  S/p left lumpectomy and SLNB 5/5/23  S/p 12 weeks of weekly taxol + herceptin  Completed adjuvant RT  Now on maintenance herceptin q3 weeks, plan to complete 1 year. Tolerating well.   Last echo was in January, repeat in May 2024.  Repeat left mammogram in May 2024  Labs reviewed, will proceed with C14 treatment today as planned.  Follow up with plastic surgeon as recommended to discuss reconstruction surgery -scheduled for 4/12/24.      Neutropenia:  Mild, at baseline   No fevers or signs of infection, she knows to call if she develops symptoms.   Continue to monitor      Planned Follow Up: 4/12/24 with Dr. Treviño and labs      Risk Level: HIGH, breast cancer, receiving chemotherapy requiring close monitoring     The 21st Century Cures Act makes medical  notes like these available to patients in the interest of transparency. Please be advised this is a medical document. Medical documents are intended to carry relevant information, facts as evident, and the clinical opinion of the practitioner. The medical note is intended as peer to peer communication and may appear blunt or direct. It is written in medical language and may contain abbreviations or verbiage that are unfamiliar.     Electronically Signed by:    ROMANA Christianson-BC  Nurse Practitioner  [facility]

## 2024-03-25 ENCOUNTER — OFFICE VISIT (OUTPATIENT)
Dept: HEMATOLOGY/ONCOLOGY | Facility: HOSPITAL | Age: 54
End: 2024-03-25
Attending: INTERNAL MEDICINE
Payer: COMMERCIAL

## 2024-03-25 VITALS
BODY MASS INDEX: 29.22 KG/M2 | HEIGHT: 66.22 IN | WEIGHT: 181.81 LBS | TEMPERATURE: 98 F | SYSTOLIC BLOOD PRESSURE: 155 MMHG | OXYGEN SATURATION: 98 % | DIASTOLIC BLOOD PRESSURE: 95 MMHG | HEART RATE: 80 BPM | RESPIRATION RATE: 18 BRPM

## 2024-03-25 DIAGNOSIS — Z79.899 ENCOUNTER FOR MONITORING CARDIOTOXIC DRUG THERAPY: Primary | ICD-10-CM

## 2024-03-25 DIAGNOSIS — C50.112 MALIGNANT NEOPLASM OF CENTRAL PORTION OF LEFT BREAST IN FEMALE, ESTROGEN RECEPTOR NEGATIVE (HCC): Primary | ICD-10-CM

## 2024-03-25 DIAGNOSIS — Z17.1 MALIGNANT NEOPLASM OF CENTRAL PORTION OF LEFT BREAST IN FEMALE, ESTROGEN RECEPTOR NEGATIVE (HCC): ICD-10-CM

## 2024-03-25 DIAGNOSIS — C50.112 MALIGNANT NEOPLASM OF CENTRAL PORTION OF LEFT BREAST IN FEMALE, ESTROGEN RECEPTOR NEGATIVE (HCC): ICD-10-CM

## 2024-03-25 DIAGNOSIS — Z51.81 ENCOUNTER FOR MONITORING CARDIOTOXIC DRUG THERAPY: ICD-10-CM

## 2024-03-25 DIAGNOSIS — Z98.890 S/P LUMPECTOMY, LEFT BREAST: ICD-10-CM

## 2024-03-25 DIAGNOSIS — D70.8 OTHER NEUTROPENIA (HCC): ICD-10-CM

## 2024-03-25 DIAGNOSIS — Z79.899 ENCOUNTER FOR MONITORING CARDIOTOXIC DRUG THERAPY: ICD-10-CM

## 2024-03-25 DIAGNOSIS — Z17.1 MALIGNANT NEOPLASM OF CENTRAL PORTION OF LEFT BREAST IN FEMALE, ESTROGEN RECEPTOR NEGATIVE (HCC): Primary | ICD-10-CM

## 2024-03-25 DIAGNOSIS — Z51.81 ENCOUNTER FOR MONITORING CARDIOTOXIC DRUG THERAPY: Primary | ICD-10-CM

## 2024-03-25 LAB
ALBUMIN SERPL-MCNC: 3.5 G/DL (ref 3.4–5)
ALBUMIN/GLOB SERPL: 0.7 {RATIO} (ref 1–2)
ALP LIVER SERPL-CCNC: 77 U/L
ALT SERPL-CCNC: 35 U/L
ANION GAP SERPL CALC-SCNC: 8 MMOL/L (ref 0–18)
AST SERPL-CCNC: 32 U/L (ref 15–37)
BASOPHILS # BLD AUTO: 0.02 X10(3) UL (ref 0–0.2)
BASOPHILS NFR BLD AUTO: 0.7 %
BILIRUB SERPL-MCNC: 0.3 MG/DL (ref 0.1–2)
BUN BLD-MCNC: 9 MG/DL (ref 9–23)
CALCIUM BLD-MCNC: 9.4 MG/DL (ref 8.5–10.1)
CHLORIDE SERPL-SCNC: 104 MMOL/L (ref 98–112)
CO2 SERPL-SCNC: 27 MMOL/L (ref 21–32)
CREAT BLD-MCNC: 0.73 MG/DL
EGFRCR SERPLBLD CKD-EPI 2021: 98 ML/MIN/1.73M2 (ref 60–?)
EOSINOPHIL # BLD AUTO: 0.05 X10(3) UL (ref 0–0.7)
EOSINOPHIL NFR BLD AUTO: 1.7 %
ERYTHROCYTE [DISTWIDTH] IN BLOOD BY AUTOMATED COUNT: 12.6 %
GLOBULIN PLAS-MCNC: 5.2 G/DL (ref 2.8–4.4)
GLUCOSE BLD-MCNC: 114 MG/DL (ref 70–99)
HCT VFR BLD AUTO: 39.4 %
HGB BLD-MCNC: 13.6 G/DL
IMM GRANULOCYTES # BLD AUTO: 0 X10(3) UL (ref 0–1)
IMM GRANULOCYTES NFR BLD: 0 %
LYMPHOCYTES # BLD AUTO: 1.3 X10(3) UL (ref 1–4)
LYMPHOCYTES NFR BLD AUTO: 44.2 %
MCH RBC QN AUTO: 31.4 PG (ref 26–34)
MCHC RBC AUTO-ENTMCNC: 34.5 G/DL (ref 31–37)
MCV RBC AUTO: 91 FL
MONOCYTES # BLD AUTO: 0.32 X10(3) UL (ref 0.1–1)
MONOCYTES NFR BLD AUTO: 10.9 %
NEUTROPHILS # BLD AUTO: 1.25 X10 (3) UL (ref 1.5–7.7)
NEUTROPHILS # BLD AUTO: 1.25 X10(3) UL (ref 1.5–7.7)
NEUTROPHILS NFR BLD AUTO: 42.5 %
OSMOLALITY SERPL CALC.SUM OF ELEC: 288 MOSM/KG (ref 275–295)
PLATELET # BLD AUTO: 210 10(3)UL (ref 150–450)
POTASSIUM SERPL-SCNC: 3.9 MMOL/L (ref 3.5–5.1)
PROT SERPL-MCNC: 8.7 G/DL (ref 6.4–8.2)
RBC # BLD AUTO: 4.33 X10(6)UL
SODIUM SERPL-SCNC: 139 MMOL/L (ref 136–145)
WBC # BLD AUTO: 2.9 X10(3) UL (ref 4–11)

## 2024-03-25 PROCEDURE — 96413 CHEMO IV INFUSION 1 HR: CPT

## 2024-03-25 PROCEDURE — 99215 OFFICE O/P EST HI 40 MIN: CPT | Performed by: NURSE PRACTITIONER

## 2024-03-25 PROCEDURE — 85025 COMPLETE CBC W/AUTO DIFF WBC: CPT

## 2024-03-25 PROCEDURE — 80053 COMPREHEN METABOLIC PANEL: CPT

## 2024-03-25 NOTE — PROGRESS NOTES
Chief Complaint   Patient presents with    Follow - Up    Chemotherapy     Pt is here for treatment - C14 D1 trastuzumab is expected. Pt recently returned from a trip to [place] to bury her mother; denies any complaints. Feels fine mostly; does report some fatigue.    Education Record    Learner:  Patient    Disease / Diagnosis: breast cancer    Barriers / Limitations:  None   Comments:    Method:  Brief focused   Comments:    General Topics:  Diet, Medication, Pain, Side effects and symptom management, and Plan of care reviewed   Comments:    Outcome:  Shows understanding   Comments:

## 2024-03-25 NOTE — PROGRESS NOTES
Pt here for C14D1 Drug(s)Trazimera.  Arrives Ambulating independently, accompanied by Self     Patient was evaluated today by GORDY.    Oral medications included in this regimen:  no    Patient confirms comprehension of cancer treatment schedule:  yes    Pregnancy screening:  Denies possibility of pregnancy    Modifications in dose or schedule:  No    Medications appearance and physical integrity checked by RN: yes.    Chemotherapy IV pump settings verified by 2 RNs:  No due to targeted therapy IV administration.  Frequency of blood return and site check throughout administration: Prior to administration     Infusion/treatment outcome:  patient tolerated treatment without incident    Education Record    Learner:  Patient  Barriers / Limitations:  None  Method:  Discussion  Education / instructions given:  POC  Outcome:  Shows understanding    Discharged Home, Ambulating independently, accompanied by:Self    Patient/family verbalized understanding of future appointments: by printed AVS

## 2024-04-12 ENCOUNTER — OFFICE VISIT (OUTPATIENT)
Dept: HEMATOLOGY/ONCOLOGY | Facility: HOSPITAL | Age: 54
End: 2024-04-12
Attending: INTERNAL MEDICINE
Payer: COMMERCIAL

## 2024-04-12 ENCOUNTER — OFFICE VISIT (OUTPATIENT)
Dept: SURGERY | Facility: CLINIC | Age: 54
End: 2024-04-12
Payer: COMMERCIAL

## 2024-04-12 VITALS
HEART RATE: 59 BPM | HEIGHT: 66.93 IN | OXYGEN SATURATION: 98 % | WEIGHT: 178.38 LBS | TEMPERATURE: 97 F | SYSTOLIC BLOOD PRESSURE: 147 MMHG | BODY MASS INDEX: 28 KG/M2 | DIASTOLIC BLOOD PRESSURE: 90 MMHG | RESPIRATION RATE: 16 BRPM

## 2024-04-12 VITALS
WEIGHT: 178 LBS | TEMPERATURE: 99 F | RESPIRATION RATE: 16 BRPM | SYSTOLIC BLOOD PRESSURE: 142 MMHG | HEART RATE: 60 BPM | HEIGHT: 66 IN | OXYGEN SATURATION: 100 % | BODY MASS INDEX: 28.61 KG/M2 | DIASTOLIC BLOOD PRESSURE: 87 MMHG

## 2024-04-12 DIAGNOSIS — C50.112 MALIGNANT NEOPLASM OF CENTRAL PORTION OF LEFT BREAST IN FEMALE, ESTROGEN RECEPTOR NEGATIVE (HCC): Primary | ICD-10-CM

## 2024-04-12 DIAGNOSIS — Z98.890 S/P LUMPECTOMY, LEFT BREAST: ICD-10-CM

## 2024-04-12 DIAGNOSIS — Z51.81 ENCOUNTER FOR MONITORING CARDIOTOXIC DRUG THERAPY: ICD-10-CM

## 2024-04-12 DIAGNOSIS — Z17.1 MALIGNANT NEOPLASM OF CENTRAL PORTION OF LEFT BREAST IN FEMALE, ESTROGEN RECEPTOR NEGATIVE (HCC): Primary | ICD-10-CM

## 2024-04-12 DIAGNOSIS — Z51.11 CHEMOTHERAPY MANAGEMENT, ENCOUNTER FOR: ICD-10-CM

## 2024-04-12 DIAGNOSIS — G62.0 CHEMOTHERAPY-INDUCED PERIPHERAL NEUROPATHY (HCC): ICD-10-CM

## 2024-04-12 DIAGNOSIS — D70.8 OTHER NEUTROPENIA (HCC): ICD-10-CM

## 2024-04-12 DIAGNOSIS — Z79.899 ENCOUNTER FOR MONITORING CARDIOTOXIC DRUG THERAPY: ICD-10-CM

## 2024-04-12 DIAGNOSIS — T45.1X5A CHEMOTHERAPY-INDUCED PERIPHERAL NEUROPATHY (HCC): ICD-10-CM

## 2024-04-12 DIAGNOSIS — D05.12 DUCTAL CARCINOMA IN SITU (DCIS) OF LEFT BREAST: ICD-10-CM

## 2024-04-12 PROCEDURE — 3080F DIAST BP >= 90 MM HG: CPT | Performed by: SURGERY

## 2024-04-12 PROCEDURE — 3077F SYST BP >= 140 MM HG: CPT | Performed by: SURGERY

## 2024-04-12 PROCEDURE — 3008F BODY MASS INDEX DOCD: CPT | Performed by: SURGERY

## 2024-04-12 PROCEDURE — 99243 OFF/OP CNSLTJ NEW/EST LOW 30: CPT | Performed by: SURGERY

## 2024-04-12 PROCEDURE — 99215 OFFICE O/P EST HI 40 MIN: CPT | Performed by: INTERNAL MEDICINE

## 2024-04-12 NOTE — PATIENT INSTRUCTIONS
Surgeon:         Dr. Dominic Avery                                        Tel:         [phone]                                  Fax:        [phone]    Surgery/Procedure: Right mastopexy, left breast scar revision, removal of right chest port, 2 hours, general anesthesia, outpatient     Dx Code: C50.112    Hospital:  [facility]: [address] [place] [address]           [phone]  [facility]: [address] [place] [address]               [phone]    1. Someone will need to drive you to and from the hospital if your procedure is outpatient.    2.Do not drink alcohol or smoke 24 hours prior to your procedure.    3. Bring a picture ID and your insurance card.    4. You will be contacted by the hospital the day before to confirm the procedure time and location.     5. Do not take any herbal supplements or blood thinners at least one week before your procedure/surgery. This includes NSAID's (aspirin, baby aspirin, Motrin, Ibuprofen, Aleve, Advil, Naproxen, etc), Plavix, fish oil, vitamin E, turmeric, CoQ10, or green tea supplements, etc. *TYLENOL or acetaminophen is ok to take*    6. PRE-OPERATIVE TESTING: History and physical with medical clearance is REQUIRED within 30 days of the surgery date and is mandatory per Dr. Avery. *If this is not done, your surgery will be postponed*  MEDICAL CLEARANCE WITH DR. Jefferson  CBC  CMP  EKG (within 90 days)  *Need oncology clearance with Dr. Treviño*    7. If you take Coumadin, Plavix, Xarelto, or Eliquis, please contact your prescribing physician for special instructions on how long to hold. If you take insulin, contact your primary care physician for special instructions.     8. Please inform us if you start or change any medications at least one week before surgery (ex: blood thinners, weight loss medications, diabetic medications, herbal supplements, etc)    9. Does patient have diagnosis of sleep apnea?    [   ] Yes     [ X  ]   No    Consent obtained  Photos taken on 4/12/2024

## 2024-04-12 NOTE — CONSULTS
New Patient Consultation    This is the first visit for this 53 year old female referred for evaluation of breast asymmetry following breast conservation therapy.    History of Present Illness:   The patient is a 53 year old female referred by Dr. Heaton for evaluation of breast asymmetry.  The patient is a history of left breast cancer for which she underwent breast conservation therapy, completing breast radiation in 2023.  The patient is interested in reconstruction of her left nipple.  She also reports breast asymmetry and is interested in discussing reconstructive options.  The patient is currently undergoing Herceptin treatments and will be completing them in May.    Past Medical History:      Past Medical History:    Cancer (HCC)    BREAST CANCER,LEFT    High blood pressure    High cholesterol    Hyperlipidemia    Hypertension    Wears dentures         Past Surgical History:  Past Surgical History:   Procedure Laterality Date    Breast biopsy  2023          x 2    Chemotherapy      Lumpectomy left  2023    invasive ductal    Needle biopsy left  2023    DCIS, invasive ductal in lumpex specimen    Needle biopsy left  2023    MRbx, fibroadenoma    Radiation left           Medications:    Current Outpatient Medications on File Prior to Visit   Medication Sig Dispense Refill    Multiple Vitamin (MULTI-VITAMIN DAILY) Oral Tab Take by mouth daily.      hydrocortisone 2.5 % External Cream Apply 1 Application topically 2 (two) times daily. For facial rash 20 g 0    atenolol 50 MG Oral Tab Take 1 tablet (50 mg total) by mouth daily.      atorvastatin 20 MG Oral Tab Take 1 tablet (20 mg total) by mouth daily.      Ferrous Sulfate (IRON) 325 (65 Fe) MG Oral Tab Take by mouth daily.       No current facility-administered medications on file prior to visit.         Allergies:    No Known Allergies      Family History:   Family History   Problem Relation Age of Onset    Breast Cancer Self           Social History:  History   Alcohol Use    Yes     Comment: occasional wine       History   Smoking Status    Never   Smokeless Tobacco    Never       History   Drug Use Unknown           Review of Systems:    General:   The patient denies, fever, chills, night sweats, fatigue, generalized weakness, change in appetite, weight loss, or weight gain.    Endocrine:   There is no history of poor/slow wound healing, weight loss/gain, fertility or hormone problems, cold intolerance, heat intolerance, excessive thirst, or thyroid disease.     Allergic/Immunologic:  There is no history of hives, hay fever, angioedema or anaphylaxis.    HEENT:    The patient denies ear pain, ear drainage, hearing loss, change in vision, double vision, cataracts, glaucoma, nasal congestion, nosebleed, hoarseness, sore throat, or swollen glands    Respiratory:   The patient denies shortness of breath, cough, bloody cough, phlegm, asthma, or wheezing    Cardiovascular:  The patient denies chest pain/pressure, palpitations, irregular heartbeat, high blood pressure, stroke, or leg swelling    Breasts:  Patient denies breast masses, pain, change in the breast skin, skin dimpling, nipple discharge, or rash    Gastrointestinal:   There is no history of difficulty or pain with swallowing, reflux symptoms, nausea, vomiting, dark/ bloody stools, diarrhea, constipation,  change in bowel habits, or abdominal pain.     Genitourinary:  The patient denies frequent urination, needing to get up at night to urinate, urinary hesitancy or retaining urine, painful urination, urinary incontinence, decreased urine stream, blood in the urine, or vaginal/penile discharge.    Skin:   The patient denies rash, itching, skin lesions, dry skin, change in skin color or change in moles, sunburns, or sunburns with blistering.     Hematologic/Lymphatic:  The patient denies easily bruising or bleeding, persistent swollen glands or lymph nodes, bleeding disorders, blood  clots, or pulmonary embolism.     Gynecologic:  The patient denies irregular menses, pelvic pain, pain with intercourse, painful menses, or pregnancy    Musculoskeletal:  The patient denies muscle aches/pain, joint pain, stiff joints, neck pain, back pain or bone pain.    Neurologic:  There is no history of migraines or severe headaches, seizure/epilepsy, speech problems, coordination problems, trembling/tremors, fainting/black outs, dizziness, memory problems, loss of sensation/numbness, problems walking, weakness, tingling or burning in hands/feet.     Psychiatric:  There is no history of abusive relationship, bipolar disorder, sleep disturbance, anxiety, depression or feeling of despair.    Physical Exam:  Vitals:    04/12/24 0912   BP: 147/90   Pulse: 59   Resp: 16   Temp: 96.9 °F (36.1 °C)     Body mass index is 28 kg/m².      The patient is awake, alert, and oriented.  She and is a well-nourished, well-developed female who appears her stated age.  Her speech patterns and movements are normal, and affect is appropriate.    HEENT: The head is normocephalic. The neck is supple. The thyroid is not enlarged and is without palpable masses.  The trachea is in the midline. Conjunctiva are clear, non-icteric.    Chest: A right upper chest wall port is noted in place.  Accessory preaxillary breast tissue is noted on the right side.    Breasts: General inspection of the breast shows absence of the left nipple areolar complex with a well-healed transverse scar.  The right breast appears approximately 10% larger than the left.  The left breast is noted to have mild hyperpigmentation and fibrosis consistent with radiation change.  Medial skin excess is noted.  The right breast is noted to have grade 2 ptosis.  There is no right breast nipple inversion or nipple discharge noted.  Measurements: Right breast: Sternal notch nipple 32 cm, base diameter 17 cm, nipple to inframammary fold is 15 cm.  Left breast: Nipple to scar  measurement is 32 cm, base diameter 17 cm, scar to inframammary fold is 13 cm.    Lymph Nodes:  There is no cervical, supraclavicular, or axillary lymphadenopathy appreciated.    Back: There is no vertebral column tenderness.    Skin: The skin appears normal. There are no suspicious appearing rashes or lesions.    Extremities: The extremities are without deformity, cyanosis or edema.    Impression:   The patient is a 53 year old Female with a history of left breast conservation therapy who now presents with breast asymmetry and absence of the left nipple areolar complex.      Discussion and Plan:  Surgical treatment options were reviewed with the patient.  Regarding the left breast, we discussed options for nipple areolar reconstruction.  Given her previous radiation therapy, I recommended 3D areolar tattooing and the patient is in agreement with this option.  We discussed extension of the existing scar medially for excision of the soft tissue excess of the left breast.  We also discussed options for a contralateral balancing mastopexy.  The nature of the procedure was reviewed with the patient.  We discussed the Wise pattern scarring. The risks of surgery including but not limited to bleeding, infection, scarring, delayed wound healing, asymmetry, loss of nipple sensation, nipple areolar necrosis, fat necrosis resulting in firmness of the breast, hypertrophic scarring or keloid, unexpected pathology requiring further surgery, and need for revision surgery were discussed.  We also discussed the potential need for free nipple grafting based on operative findings with resultant loss of nipple sensation and hypo-or hyperpigmentation.  We also reviewed the expected postoperative course including need for activity limitation and compression.  Multiple questions were answered to patient's satisfaction.  No guarantees as to outcome were offered.  The patient expresses understanding wishes to proceed.  We also plan for port  removal if okay with Dr. Treviño.

## 2024-04-12 NOTE — PROGRESS NOTES
Hematology Oncology Follow-up Note    Patient Name: Fabienne Rodriguez   YOB: 1970   Medical Record Number: [id number]   CSN: [id number]   Attending Physician: Brittany Treviño MD     Date of Visit: 04/12/24      Chief Complaint: Follow up, chemo    Diagnosis: Left breast invasive ductal carcinoma with extensive DCIS, ER-, MI-, HER2 positive, Ki-76 high, pT1bN0    Oncologic History:  9/2022: patient noted bloody nipple discharge ~2 weeks then stopped    11/2022: mammogram at [facility] revealed an abnormality in the left breast behind the nipple, a biopsy was recommended, but [facility].     3/24/2023: ultrasound of the retroareolar left breast demonstrated focally dilated ducts with questionable intraluminal soft tissue. A focal lesion at 11 o'clock in that area measuring 0.4 x 0.4 x 0.3 cm. Additional focally dilated duct at 12 o'clock in the left breast measuring 0.5 x 0.3 cm. US guided biopsy of these lesions showed DCIS with multiple foci of at least microinvasion, largest focus of microinvasion measuring 0.9 mm (0.09 cm). ER negative, MI negative, HER2 negative, Ki-67 40%.   Largest focus of DCIS measures 16 mm (1.6 cm), grade 3, solid, cribriform and comedo types, with involvement of sclerosing lesion.    4/6/2023: MRI breast showed enhancement in the retroareolar left breast spans an area of 1.9 x 1.9 x 5.1 cm and corresponds with known DCIS. Area of enhancement in the retroareolar right breast measuring 0.7 x 0.8 x 0.5 cm which MR guided biopsy is recommended.   Mildly prominent bilateral axillary lymph nodes.  These are nonspecific but may be reactive in nature since they are symmetric.     4/14/2023: MRI guided right breast retroareolar biopsy showed fibroadenoma. Negative for malignancy.     5/5/2023: left breast central lumpectomy, surgical margins x5, and sentinel lymph node biopsy with Dr. Heaton.   Final path showed 6 mm of invasive ductal  carcinoma, grade 3, ER negative, CO negative, HER2 (2+) amplified by FISH, Ki-67 high 80-90%. 3 sentinel lymph nodes all negative for carcinoma. All margins negative for invasive carcinoma. DCIS spanning an area 2.5 cm, grade 3, all margins negative for DCIS but close superior margin <1 mm.     Adjuvant TH  5/30/23: C1 TH   6/20/23: C2 TH  7/11/23: C3 TH  8/01/23: C4 TH  8/22/23: C5  Herceptin  9/12/23: C6 Herceptin  10/3/23: C7 Herceptin, completed RT   10/23/23: C8 Herceptin  11/13/23: C9 Herceptin  12/4/23: C10 Herceptin   12/26/23: C11 Herceptin  1/15/24: C12 Herceptin  2/5/24: C13 Herceptin  3/25/24: C14 Herceptin     Interval History:  Here for follow up and evaluation prior to next chemo.   She is on maintenance herceptin q3 wks at [facility], completed 14 cycles and due for C15 on Monday 4/15.   She continues to do well and tolerating therapy pretty well.   Went to [facility] and had a stressful trip.   Now noted recurrent facial acneiform rash since she returned. No drainage or erythema.  Denies any pain, fatigue, weight loss. No dyspnea, fever, nausea, tingling, numbness. No infections.   No breast changes. Saw Dr. Avery for consideration left nipple reconstruction.     Performance Status: ECOG 0     Current Medications:    Current Outpatient Medications:     Multiple Vitamin (MULTI-VITAMIN DAILY) Oral Tab, Take by mouth daily., Disp: , Rfl:     hydrocortisone 2.5 % External Cream, Apply 1 Application topically 2 (two) times daily. For facial rash, Disp: 20 g, Rfl: 0    atenolol 50 MG Oral Tab, Take 1 tablet (50 mg total) by mouth daily., Disp: , Rfl:     atorvastatin 20 MG Oral Tab, Take 1 tablet (20 mg total) by mouth daily., Disp: , Rfl:     Ferrous Sulfate (IRON) 325 (65 Fe) MG Oral Tab, Take by mouth daily., Disp: , Rfl:        Review of Systems:  10 -point systems reviewed, all negative except as mentioned in the HPI/interval history.     Vital Signs:      4/12/2024     9:12 AM 4/12/2024    11:45 AM   Vitals  History   /90 142/87   BP Location Right arm Right arm   Patient Position Sitting Sitting   Cuff Size large adult   Pulse 59 60   Resp 16 16   Temp 96.9 °F (36.1 °C) 98.5 °F (36.9 °C)   SpO2 98 % 100 %   Weight 178 lbs 6 oz 178 lbs   Height 1.7 m (5' 6.93\") 1.676 m (5' 6\")   BMI 28 kg/m2 28.73 kg/m2         Physical Examination:  General: Aert and oriented x 3, not in acute distress.  Psych: Appropriate mood and affect.   Neck: No palpable lymphadenopathy.  Breast: Left breast central lumpectomy (absent nipple) with post RT changes. Skin intact. No palpable masses or tenderness. No axillary lymphadenopathy bilaterally. R breast with no palpable masses.   Chest: Clear to auscultation.  Heart: Regular rate and rhythm.  Abdomen: Soft, non tender.  Extremities: No peripheral edema. Nail discoloration improving.   Neurological: Grossly intact.     Laboratory:  Lab Results   Component Value Date    WBC 2.9 (L) 03/25/2024    RBC 4.33 03/25/2024    HGB 13.6 03/25/2024    HCT 39.4 03/25/2024    MCV 91.0 03/25/2024    MCH 31.4 03/25/2024    MCHC 34.5 03/25/2024    RDW 12.6 03/25/2024    .0 03/25/2024     Lab Results   Component Value Date     03/25/2024    K 3.9 03/25/2024     03/25/2024    CO2 27.0 03/25/2024    BUN 9 03/25/2024    CREATSERUM 0.73 03/25/2024     (H) 03/25/2024    CA 9.4 03/25/2024    ALKPHO 77 03/25/2024    ALT 35 03/25/2024    AST 32 03/25/2024    BILT 0.3 03/25/2024    ALB 3.5 03/25/2024    TP 8.7 (H) 03/25/2024     Lab Results   Component Value Date/Time    JAVIER 190.8 11/13/2023 10:31 AM    JAVIER 42.0 07/03/2023 11:32 AM     Lab Results   Component Value Date/Time    SAT 41 11/13/2023 10:31 AM    SAT 25 07/03/2023 11:32 AM     Radiology:  No results found.      Impression:    Left breast invasive ductal carcinoma with extensive DCIS, grade 3, ER/NC negative, HER2 positive, Ki-67 high, pT1bN0:  - s/p left lumpectomy and SNB 5/5/23; pT1bN0. all margins negative for invasive  carcinoma and DCIS, but had close DCIS superior margin <1 mm. re-excision was not recommended.   - s/p 12 weeks of adjuvant TH and adjuvant XRT.   - now on maintenance herceptin q3 weeks. Plan to complete 1 yr. Tolerating well without toxicities.   - TTE in 1/2024 normal.    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  - s/p venofer 200 mg x5 with chemo. 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   - on B complex. No need for meds.     Plan:  - proceed with C15D1 herceptin on 4/15/24 at [facility].  - left diagnostic mammogram in 5/2024   - repeat TTE in 5/2024  - follows with surgery and plastics as directed  - RTC q 3 wks APN + herceptin at [facility]  - RTC in 9 wks with me at [facility] prior to last Herceptin     HIGH - breast cancer receiving chemotherapy with systemic effects requiring intervention and close monitoring     Brittany Treviño MD  Hematology Oncology

## 2024-04-15 ENCOUNTER — HOSPITAL ENCOUNTER (OUTPATIENT)
Dept: CV DIAGNOSTICS | Facility: HOSPITAL | Age: 54
Discharge: HOME | End: 2024-04-15
Attending: CLINICAL NURSE SPECIALIST
Payer: COMMERCIAL

## 2024-04-15 ENCOUNTER — OFFICE VISIT (OUTPATIENT)
Dept: HEMATOLOGY/ONCOLOGY | Facility: HOSPITAL | Age: 54
End: 2024-04-15
Attending: INTERNAL MEDICINE
Payer: COMMERCIAL

## 2024-04-15 VITALS
BODY MASS INDEX: 28.87 KG/M2 | TEMPERATURE: 98 F | OXYGEN SATURATION: 98 % | DIASTOLIC BLOOD PRESSURE: 84 MMHG | HEART RATE: 57 BPM | WEIGHT: 179.63 LBS | HEIGHT: 65.98 IN | RESPIRATION RATE: 16 BRPM | SYSTOLIC BLOOD PRESSURE: 127 MMHG

## 2024-04-15 DIAGNOSIS — Z17.1 MALIGNANT NEOPLASM OF CENTRAL PORTION OF LEFT BREAST IN FEMALE, ESTROGEN RECEPTOR NEGATIVE (HCC): ICD-10-CM

## 2024-04-15 DIAGNOSIS — C50.112 MALIGNANT NEOPLASM OF CENTRAL PORTION OF LEFT BREAST IN FEMALE, ESTROGEN RECEPTOR NEGATIVE (HCC): Primary | ICD-10-CM

## 2024-04-15 DIAGNOSIS — Z51.81 ENCOUNTER FOR MONITORING CARDIOTOXIC DRUG THERAPY: ICD-10-CM

## 2024-04-15 DIAGNOSIS — R00.2 PALPITATIONS: Primary | ICD-10-CM

## 2024-04-15 DIAGNOSIS — R00.2 PALPITATIONS: ICD-10-CM

## 2024-04-15 DIAGNOSIS — Z17.1 MALIGNANT NEOPLASM OF CENTRAL PORTION OF LEFT BREAST IN FEMALE, ESTROGEN RECEPTOR NEGATIVE (HCC): Primary | ICD-10-CM

## 2024-04-15 DIAGNOSIS — Z79.899 ENCOUNTER FOR MONITORING CARDIOTOXIC DRUG THERAPY: ICD-10-CM

## 2024-04-15 DIAGNOSIS — C50.112 MALIGNANT NEOPLASM OF CENTRAL PORTION OF LEFT BREAST IN FEMALE, ESTROGEN RECEPTOR NEGATIVE (HCC): ICD-10-CM

## 2024-04-15 DIAGNOSIS — F41.9 ANXIETY: ICD-10-CM

## 2024-04-15 LAB
ALBUMIN SERPL-MCNC: 3.4 G/DL (ref 3.4–5)
ALBUMIN/GLOB SERPL: 0.6 {RATIO} (ref 1–2)
ALP LIVER SERPL-CCNC: 71 U/L
ALT SERPL-CCNC: 49 U/L
ANION GAP SERPL CALC-SCNC: 4 MMOL/L (ref 0–18)
AST SERPL-CCNC: 30 U/L (ref 15–37)
ATRIAL RATE: 59 BPM
BASOPHILS # BLD AUTO: 0.02 X10(3) UL (ref 0–0.2)
BASOPHILS NFR BLD AUTO: 0.7 %
BILIRUB SERPL-MCNC: 0.3 MG/DL (ref 0.1–2)
BUN BLD-MCNC: 14 MG/DL (ref 9–23)
CALCIUM BLD-MCNC: 9.2 MG/DL (ref 8.5–10.1)
CHLORIDE SERPL-SCNC: 105 MMOL/L (ref 98–112)
CO2 SERPL-SCNC: 30 MMOL/L (ref 21–32)
CREAT BLD-MCNC: 0.85 MG/DL
EGFRCR SERPLBLD CKD-EPI 2021: 82 ML/MIN/1.73M2 (ref 60–?)
EOSINOPHIL # BLD AUTO: 0.08 X10(3) UL (ref 0–0.7)
EOSINOPHIL NFR BLD AUTO: 2.8 %
ERYTHROCYTE [DISTWIDTH] IN BLOOD BY AUTOMATED COUNT: 11.9 %
FASTING STATUS PATIENT QL REPORTED: NO
GLOBULIN PLAS-MCNC: 5.3 G/DL (ref 2.8–4.4)
GLUCOSE BLD-MCNC: 110 MG/DL (ref 70–99)
HCT VFR BLD AUTO: 37.8 %
HGB BLD-MCNC: 12.6 G/DL
IMM GRANULOCYTES # BLD AUTO: 0 X10(3) UL (ref 0–1)
IMM GRANULOCYTES NFR BLD: 0 %
LYMPHOCYTES # BLD AUTO: 1.31 X10(3) UL (ref 1–4)
LYMPHOCYTES NFR BLD AUTO: 46.3 %
MCH RBC QN AUTO: 30.3 PG (ref 26–34)
MCHC RBC AUTO-ENTMCNC: 33.3 G/DL (ref 31–37)
MCV RBC AUTO: 90.9 FL
MONOCYTES # BLD AUTO: 0.32 X10(3) UL (ref 0.1–1)
MONOCYTES NFR BLD AUTO: 11.3 %
NEUTROPHILS # BLD AUTO: 1.1 X10 (3) UL (ref 1.5–7.7)
NEUTROPHILS # BLD AUTO: 1.1 X10(3) UL (ref 1.5–7.7)
NEUTROPHILS NFR BLD AUTO: 38.9 %
OSMOLALITY SERPL CALC.SUM OF ELEC: 289 MOSM/KG (ref 275–295)
P AXIS: 84 DEGREES
P-R INTERVAL: 160 MS
PLATELET # BLD AUTO: 220 10(3)UL (ref 150–450)
POTASSIUM SERPL-SCNC: 4 MMOL/L (ref 3.5–5.1)
PROT SERPL-MCNC: 8.7 G/DL (ref 6.4–8.2)
Q-T INTERVAL: 434 MS
QRS DURATION: 84 MS
QTC CALCULATION (BEZET): 429 MS
R AXIS: 97 DEGREES
RBC # BLD AUTO: 4.16 X10(6)UL
SODIUM SERPL-SCNC: 139 MMOL/L (ref 136–145)
T AXIS: 67 DEGREES
TSI SER-ACNC: 3.5 MIU/ML (ref 0.36–3.74)
VENTRICULAR RATE: 59 BPM
WBC # BLD AUTO: 2.8 X10(3) UL (ref 4–11)

## 2024-04-15 PROCEDURE — 93010 ELECTROCARDIOGRAM REPORT: CPT | Performed by: INTERNAL MEDICINE

## 2024-04-15 PROCEDURE — 80053 COMPREHEN METABOLIC PANEL: CPT

## 2024-04-15 PROCEDURE — 93005 ELECTROCARDIOGRAM TRACING: CPT

## 2024-04-15 PROCEDURE — 84443 ASSAY THYROID STIM HORMONE: CPT

## 2024-04-15 PROCEDURE — 85025 COMPLETE CBC W/AUTO DIFF WBC: CPT

## 2024-04-15 PROCEDURE — 99215 OFFICE O/P EST HI 40 MIN: CPT | Performed by: CLINICAL NURSE SPECIALIST

## 2024-04-15 NOTE — PROGRESS NOTES
EKG done and asked GARRETT Tom to review. EKG showed PVC, but per Flory they are benign. Ok to proceed with Trazimera today. Reinforced to schedule her TTE. Patient verbalized understanding.  Pt here for C15D1 Drug(s)Trazimera.  Arrives Ambulating independently, accompanied by Spouse     Patient was evaluated today by GORDY.    Oral medications included in this regimen:  no    Patient confirms comprehension of cancer treatment schedule:  yes    Pregnancy screening:  Denies possibility of pregnancy    Modifications in dose or schedule:  No    Medications appearance and physical integrity checked by RN: yes.    Chemotherapy IV pump settings verified by 2 RNs:  No due to targeted therapy IV administration.  Frequency of blood return and site check throughout administration: Prior to administration and At completion of therapy     Infusion/treatment outcome:  patient tolerated treatment without incident    Education Record    Learner:  Patient and Spouse  Barriers / Limitations:  None  Method:  Brief focused and Reinforcement  Education / instructions given:  Plan of care reviewed. Reinforced to avoid any caffeinated drinks. Also reminded to schedule her TTE already ordered by Dr. Treviño. Patient given a copy of this order.  Outcome:  Shows understanding    Discharged Home, Ambulating independently, accompanied by:Spouse    Patient/family verbalized understanding of future appointments: by MyChart messaging

## 2024-04-15 NOTE — PROGRESS NOTES
Patient here for lab and C15 Trazimera. Saw Dr. Treviño last Friday. Comes in with elevated BP at 169/96 and c/o palpitations. Rechecked BP after drawing labs. BP is better at 127/84. Denies any shortness of breath. O2 saturations at 989%. Denied having any caffeinated drinks prior to coming to her appointment today. Denies any new medications. Informed APN. Seen and assessed by GARRETT Tom. EKG ordered by Flory. Paged EKG.

## 2024-04-15 NOTE — PROGRESS NOTES
Cancer Center Progress Note    Patient Name: Fabienne Rodriguez   YOB: 1970   Medical Record Number: [id number]   CSN: [id number]   Date of visit: 4/15/2024   Provider: ADILSON Ncik  Referring Physician: Brittany Treviño        Chief Complaint:  Palpitations     The 21st Century Cures Act makes medical notes like these available to patients in the interest of transparency. Please be advised this is a medical document. Medical documents are intended to carry relevant information, facts as evident, and the clinical opinion of the practitioner. The medical note is intended as peer to peer communication and may appear blunt or direct. It is written in medical language and may contain abbreviations or verbiage that are unfamiliar.     Oncologic History   9/2022: patient noted bloody nipple discharge ~2 weeks then stopped     11/2022: mammogram at [facility] revealed an abnormality in the left breast behind the nipple, a biopsy was recommended, but patient had to travel to [facility] for family reasons.      3/24/2023: ultrasound of the retroareolar left breast demonstrated focally dilated ducts with questionable intraluminal soft tissue. A focal lesion at 11 o'clock in that area measuring 0.4 x 0.4 x 0.3 cm. Additional focally dilated duct at 12 o'clock in the left breast measuring 0.5 x 0.3 cm. US guided biopsy of these lesions showed DCIS with multiple foci of at least microinvasion, largest focus of microinvasion measuring 0.9 mm (0.09 cm). ER negative, WV negative, HER2 negative, Ki-67 40%.   Largest focus of DCIS measures 16 mm (1.6 cm), grade 3, solid, cribriform and comedo types, with involvement of sclerosing lesion.     4/6/2023: MRI breast showed enhancement in the retroareolar left breast spans an area of 1.9 x 1.9 x 5.1 cm and corresponds with known DCIS. Area of enhancement in the retroareolar right breast measuring 0.7 x 0.8 x 0.5 cm which MR guided biopsy is recommended.   Mildly  prominent bilateral axillary lymph nodes.  These are nonspecific but may be reactive in nature since they are symmetric.      4/14/2023: MRI guided right breast retroareolar biopsy showed fibroadenoma. Negative for malignancy.      5/5/2023: left breast central lumpectomy, surgical margins x5, and sentinel lymph node biopsy with Dr. Heaton.   Final path showed 6 mm of invasive ductal carcinoma, grade 3, ER negative, KY negative, HER2 (2+) amplified by FISH, Ki-67 high 80-90%. 3 sentinel lymph nodes all negative for carcinoma. All margins negative for invasive carcinoma. DCIS spanning an area 2.5 cm, grade 3, all margins negative for DCIS but close superior margin <1 mm.      Adjuvant TH  5/30/23: C1 TH   6/20/23: C2 TH  7/11/23: C3 TH  8/01/23: C4 TH  8/22/23: C5  Herceptin  9/12/23: C6 Herceptin  10/3/23: C7 Herceptin, completed RT   10/23/23: C8 Herceptin  11/13/23: C9 Herceptin  12/4/23: C10 Herceptin   12/26/23: C11 Herceptin  1/15/24: C12 Herceptin  2/5/24: C13 Herceptin  3/25/24: C14 Herceptin   4/15/24: C15 Herceptin    History of Present Illness:  53 year old  patient of Dr. Treviño's  who presents for continuation of trastuzumab. She is being seen for an unscheduled visit due to complaint of palpitations.  She was initially noted to have high blood pressure (169/96) which was taken 10 minutes upon arrival to the [facility].  BP was repeated which normalized.  Was asked to evaluate the patient because of complaint of palpitation.  The patient is on Herceptin and last echocardiogram was in Jan 2024 which had a reported EF of 65-70%.  She has another echo ordered which is not yet scheduled.  She denies SOB.  The palpitations started at 0730 when she awoke, which occurred intermittently for 5-10 mins then again later today.  She denies chest pain or SOB.  She denies edema in the legs or elsewhere.  She has no fever.  She is taking Atenolol for h/o HTN.  She denies syncope, lightheadedness or dizziness.  She is not  followed by cardiology.  Pt admits to feeling nervous and anxious and she does not know the trigger.  She is with her  at this visit who stated they were running late coming to the [facility] which was a source of anxiety for her this morning.  She does not consume excessive caffeine.      Allergies:  No Known Allergies    Reviewed social history, PMH, PSH    Vital Signs:  LMP 05/15/2023 (Exact Date)   Height: 167.6 cm (5' 5.98\") (04/15 0942)  Weight: 81.5 kg (179 lb 9.6 oz) (04/15 0942)  BSA (Calculated - sq m): 1.91 sq meters (04/15 0942)  Pulse: 57 (04/15 1002)  BP: 127/84 (04/15 1002)  Temp: 97.8 °F (36.6 °C) (04/15 0942)  Do Not Use - Resp Rate: --  SpO2: 98 % (04/15 1002)    Medications:    Current Outpatient Medications:     Multiple Vitamin (MULTI-VITAMIN DAILY) Oral Tab, Take by mouth daily., Disp: , Rfl:     hydrocortisone 2.5 % External Cream, Apply 1 Application topically 2 (two) times daily. For facial rash, Disp: 20 g, Rfl: 0    atenolol 50 MG Oral Tab, Take 1 tablet (50 mg total) by mouth daily., Disp: , Rfl:     atorvastatin 20 MG Oral Tab, Take 1 tablet (20 mg total) by mouth daily., Disp: , Rfl:     Ferrous Sulfate (IRON) 325 (65 Fe) MG Oral Tab, Take by mouth daily., Disp: , Rfl:     Review of Systems:   As in HPI    Physical Examination:  General: Awake, alert, oriented x3, no acute distress.    HEENT:  Anicteric, conjunctivae and sclerae clear, no sinus tenderness, no oropharyngeal lesion/thrush, mucous membranes are moist.  Neck:  Supple, no tenderness, no masses or adenopathy  Lungs:  Clear to auscultation bilaterally  CV:  Regular rate and rhythm, occasional skipped beats otherwise normal cardiac exam.  Abdomen:  Non-distended, normoactive bowel sounds, soft,nontender, no hepatosplenomegaly.  Extremities:  No edema, no tenderness  Neuro:  CN 2-12 intact    Labs:     Recent Results (from the past 24 hour(s))   Comp Metabolic Panel (14)    Collection Time: 04/15/24  9:43 AM   Result Value Ref  Range    Glucose 110 (H) 70 - 99 mg/dL    Sodium 139 136 - 145 mmol/L    Potassium 4.0 3.5 - 5.1 mmol/L    Chloride 105 98 - 112 mmol/L    CO2 30.0 21.0 - 32.0 mmol/L    Anion Gap 4 0 - 18 mmol/L    BUN 14 9 - 23 mg/dL    Creatinine 0.85 0.55 - 1.02 mg/dL    Calcium, Total 9.2 8.5 - 10.1 mg/dL    Calculated Osmolality 289 275 - 295 mOsm/kg    eGFR-Cr 82 >=60 mL/min/1.73m2    AST 30 15 - 37 U/L    ALT 49 13 - 56 U/L    Alkaline Phosphatase 71 41 - 108 U/L    Bilirubin, Total 0.3 0.1 - 2.0 mg/dL    Total Protein 8.7 (H) 6.4 - 8.2 g/dL    Albumin 3.4 3.4 - 5.0 g/dL    Globulin  5.3 (H) 2.8 - 4.4 g/dL    A/G Ratio 0.6 (L) 1.0 - 2.0    Patient Fasting for CMP? No    CBC W/ DIFFERENTIAL    Collection Time: 04/15/24  9:43 AM   Result Value Ref Range    WBC 2.8 (L) 4.0 - 11.0 x10(3) uL    RBC 4.16 3.80 - 5.30 x10(6)uL    HGB 12.6 12.0 - 16.0 g/dL    HCT 37.8 35.0 - 48.0 %    .0 150.0 - 450.0 10(3)uL    MCV 90.9 80.0 - 100.0 fL    MCH 30.3 26.0 - 34.0 pg    MCHC 33.3 31.0 - 37.0 g/dL    RDW 11.9 %    Neutrophil Absolute Prelim 1.10 (L) 1.50 - 7.70 x10 (3) uL    Neutrophil Absolute 1.10 (L) 1.50 - 7.70 x10(3) uL    Lymphocyte Absolute 1.31 1.00 - 4.00 x10(3) uL    Monocyte Absolute 0.32 0.10 - 1.00 x10(3) uL    Eosinophil Absolute 0.08 0.00 - 0.70 x10(3) uL    Basophil Absolute 0.02 0.00 - 0.20 x10(3) uL    Immature Granulocyte Absolute 0.00 0.00 - 1.00 x10(3) uL    Neutrophil % 38.9 %    Lymphocyte % 46.3 %    Monocyte % 11.3 %    Eosinophil % 2.8 %    Basophil % 0.7 %    Immature Granulocyte % 0.0 %   EKG 12 Lead    Collection Time: 04/15/24 10:51 AM   Result Value Ref Range    Ventricular rate 59 BPM    Atrial rate 59 BPM    P-R Interval 160 ms    QRS Duration 84 ms    Q-T Interval 434 ms    QTC Calculation (Bezet) 429 ms    P Axis 84 degrees    R Axis 97 degrees    T Axis 67 degrees     Prelim EKG 4/15/24:  Sinus bradycardia with occasional Premature ventricular complexes   Rightward axis   Borderline ECG   When  compared with ECG of 27-APR-2023 11:32,   Premature ventricular complexes are now Present     Impression/Plan:    Palpitations:  Hemodynamically stable.  EKG demonstrates occasional PVCs.  She has no other concerning chest/cardiac symptoms.  Reassured her occ PVCs are benign but advised her to schedule her echocardiogram as ordered for ongoing monitoring/screening for heart failure on trastuzumab.  She expresses understanding.  Electrolytes are adequate.  Check TSH as well.    HER2+ breast cancer:  Ok to proceed with trastuzumab today.    Emotional Well Being:  I have assessed the patient's emotional well-being and any concerns about anxiety or depression.  No acute psychosocial intervention required at this time.  She has some underlying anxiety.  Consider I referral if she has ongoing anxiety concerns.    Risk level:  High-breast cancer on cancer therapy, requiring intervention and close monitoring.    ADILSON Nick  [facility]

## 2024-04-17 ENCOUNTER — SOCIAL WORK SERVICES (OUTPATIENT)
Dept: HEMATOLOGY/ONCOLOGY | Facility: HOSPITAL | Age: 54
End: 2024-04-17

## 2024-04-17 NOTE — PROGRESS NOTES
received completed [employer] Financial Application-sent to Financial Aid Office via email on behalf of patient. She is aware that Financial office has 30 business days to determine a decision.

## 2024-04-29 ENCOUNTER — TELEPHONE (OUTPATIENT)
Dept: SURGERY | Facility: CLINIC | Age: 54
End: 2024-04-29

## 2024-05-06 ENCOUNTER — OFFICE VISIT (OUTPATIENT)
Dept: HEMATOLOGY/ONCOLOGY | Facility: HOSPITAL | Age: 54
End: 2024-05-06
Attending: INTERNAL MEDICINE
Payer: COMMERCIAL

## 2024-05-06 ENCOUNTER — TELEPHONE (OUTPATIENT)
Dept: SURGERY | Facility: CLINIC | Age: 54
End: 2024-05-06

## 2024-05-06 VITALS
HEIGHT: 65.98 IN | TEMPERATURE: 98 F | SYSTOLIC BLOOD PRESSURE: 125 MMHG | HEART RATE: 55 BPM | BODY MASS INDEX: 29.06 KG/M2 | OXYGEN SATURATION: 100 % | RESPIRATION RATE: 18 BRPM | DIASTOLIC BLOOD PRESSURE: 85 MMHG | WEIGHT: 180.81 LBS

## 2024-05-06 DIAGNOSIS — C50.112 MALIGNANT NEOPLASM OF CENTRAL PORTION OF LEFT BREAST IN FEMALE, ESTROGEN RECEPTOR NEGATIVE (HCC): ICD-10-CM

## 2024-05-06 DIAGNOSIS — Z17.1 MALIGNANT NEOPLASM OF CENTRAL PORTION OF LEFT BREAST IN FEMALE, ESTROGEN RECEPTOR NEGATIVE (HCC): ICD-10-CM

## 2024-05-06 DIAGNOSIS — D70.1 CHEMOTHERAPY-INDUCED NEUTROPENIA (HCC): ICD-10-CM

## 2024-05-06 DIAGNOSIS — Z79.899 ENCOUNTER FOR MONITORING CARDIOTOXIC DRUG THERAPY: Primary | ICD-10-CM

## 2024-05-06 DIAGNOSIS — Z98.890 S/P LUMPECTOMY, LEFT BREAST: ICD-10-CM

## 2024-05-06 DIAGNOSIS — C50.112 MALIGNANT NEOPLASM OF CENTRAL PORTION OF LEFT BREAST IN FEMALE, ESTROGEN RECEPTOR NEGATIVE (HCC): Primary | ICD-10-CM

## 2024-05-06 DIAGNOSIS — Z17.1 MALIGNANT NEOPLASM OF CENTRAL PORTION OF LEFT BREAST IN FEMALE, ESTROGEN RECEPTOR NEGATIVE (HCC): Primary | ICD-10-CM

## 2024-05-06 DIAGNOSIS — Z51.81 ENCOUNTER FOR MONITORING CARDIOTOXIC DRUG THERAPY: Primary | ICD-10-CM

## 2024-05-06 DIAGNOSIS — T45.1X5A CHEMOTHERAPY-INDUCED NEUTROPENIA (HCC): ICD-10-CM

## 2024-05-06 LAB
ALBUMIN SERPL-MCNC: 3.3 G/DL (ref 3.4–5)
ALBUMIN/GLOB SERPL: 0.6 {RATIO} (ref 1–2)
ALP LIVER SERPL-CCNC: 69 U/L
ALT SERPL-CCNC: 49 U/L
ANION GAP SERPL CALC-SCNC: 4 MMOL/L (ref 0–18)
AST SERPL-CCNC: 38 U/L (ref 15–37)
BASOPHILS # BLD AUTO: 0.02 X10(3) UL (ref 0–0.2)
BASOPHILS NFR BLD AUTO: 0.7 %
BILIRUB SERPL-MCNC: 0.3 MG/DL (ref 0.1–2)
BUN BLD-MCNC: 9 MG/DL (ref 9–23)
CALCIUM BLD-MCNC: 9.3 MG/DL (ref 8.5–10.1)
CHLORIDE SERPL-SCNC: 108 MMOL/L (ref 98–112)
CO2 SERPL-SCNC: 26 MMOL/L (ref 21–32)
CREAT BLD-MCNC: 0.72 MG/DL
EGFRCR SERPLBLD CKD-EPI 2021: 100 ML/MIN/1.73M2 (ref 60–?)
EOSINOPHIL # BLD AUTO: 0.05 X10(3) UL (ref 0–0.7)
EOSINOPHIL NFR BLD AUTO: 1.7 %
ERYTHROCYTE [DISTWIDTH] IN BLOOD BY AUTOMATED COUNT: 11.5 %
GLOBULIN PLAS-MCNC: 5.1 G/DL (ref 2.8–4.4)
GLUCOSE BLD-MCNC: 117 MG/DL (ref 70–99)
HCT VFR BLD AUTO: 35.5 %
HGB BLD-MCNC: 12.3 G/DL
IMM GRANULOCYTES # BLD AUTO: 0 X10(3) UL (ref 0–1)
IMM GRANULOCYTES NFR BLD: 0 %
LYMPHOCYTES # BLD AUTO: 1.66 X10(3) UL (ref 1–4)
LYMPHOCYTES NFR BLD AUTO: 55.3 %
MCH RBC QN AUTO: 30.8 PG (ref 26–34)
MCHC RBC AUTO-ENTMCNC: 34.6 G/DL (ref 31–37)
MCV RBC AUTO: 89 FL
MONOCYTES # BLD AUTO: 0.28 X10(3) UL (ref 0.1–1)
MONOCYTES NFR BLD AUTO: 9.3 %
NEUTROPHILS # BLD AUTO: 0.99 X10 (3) UL (ref 1.5–7.7)
NEUTROPHILS # BLD AUTO: 0.99 X10(3) UL (ref 1.5–7.7)
NEUTROPHILS NFR BLD AUTO: 33 %
OSMOLALITY SERPL CALC.SUM OF ELEC: 286 MOSM/KG (ref 275–295)
PLATELET # BLD AUTO: 173 10(3)UL (ref 150–450)
POTASSIUM SERPL-SCNC: 3.6 MMOL/L (ref 3.5–5.1)
PROT SERPL-MCNC: 8.4 G/DL (ref 6.4–8.2)
RBC # BLD AUTO: 3.99 X10(6)UL
SODIUM SERPL-SCNC: 138 MMOL/L (ref 136–145)
WBC # BLD AUTO: 3 X10(3) UL (ref 4–11)

## 2024-05-06 PROCEDURE — 80053 COMPREHEN METABOLIC PANEL: CPT

## 2024-05-06 PROCEDURE — 96413 CHEMO IV INFUSION 1 HR: CPT

## 2024-05-06 PROCEDURE — 85025 COMPLETE CBC W/AUTO DIFF WBC: CPT

## 2024-05-06 PROCEDURE — 99215 OFFICE O/P EST HI 40 MIN: CPT | Performed by: NURSE PRACTITIONER

## 2024-05-06 NOTE — TELEPHONE ENCOUNTER
Calling pt in regards to scheduling surgery.  Informed pt that I have 03/05/2025 available at [facility] with Dr. Avery.  Pt verbalized understanding and in agreement with date and location.  All questions answered.   Encouraged pt to call or [employer] message office with any other questions or concerns.

## 2024-05-06 NOTE — PROGRESS NOTES
Cancer Center Progress Note    Patient Name: Fabienne Rodriguez   YOB: 1970   Medical Record Number: [id number]   CSN: [id number]   Date of visit: 5/6/24      Chief Complaint/Reason for Visit:  Chief Complaint   Patient presents with    Follow - Up    Chemotherapy      Oncologic History:  9/2022: patient noted bloody nipple discharge ~2 weeks then stopped     11/2022: mammogram at [place] [facility] revealed an abnormality in the left breast behind the nipple, a biopsy was recommended, but patient had to travel to [facility] for family reasons.      3/24/2023: ultrasound of the retroareolar left breast demonstrated focally dilated ducts with questionable intraluminal soft tissue. A focal lesion at 11 o'clock in that area measuring 0.4 x 0.4 x 0.3 cm. Additional focally dilated duct at 12 o'clock in the left breast measuring 0.5 x 0.3 cm. US guided biopsy of these lesions showed DCIS with multiple foci of at least microinvasion, largest focus of microinvasion measuring 0.9 mm (0.09 cm). ER negative, DE negative, HER2 negative, Ki-67 40%.   Largest focus of DCIS measures 16 mm (1.6 cm), grade 3, solid, cribriform and comedo types, with involvement of sclerosing lesion.     4/6/2023: MRI breast showed enhancement in the retroareolar left breast spans an area of 1.9 x 1.9 x 5.1 cm and corresponds with known DCIS. Area of enhancement in the retroareolar right breast measuring 0.7 x 0.8 x 0.5 cm which MR guided biopsy is recommended.   Mildly prominent bilateral axillary lymph nodes.  These are nonspecific but may be reactive in nature since they are symmetric.      4/14/2023: MRI guided right breast retroareolar biopsy showed fibroadenoma. Negative for malignancy.      5/5/2023: left breast central lumpectomy, surgical margins x5, and sentinel lymph node biopsy with Dr. Heaton.   Final path showed 6 mm of invasive ductal carcinoma, grade 3, ER negative, DE negative, HER2 (2+) amplified by FISH, Ki-67 high 80-90%. 3  sentinel lymph nodes all negative for carcinoma. All margins negative for invasive carcinoma. DCIS spanning an area 2.5 cm, grade 3, all margins negative for DCIS but close superior margin <1 mm.      Adjuvant TH  5/30/23: C1 TH   6/20/23: C2 TH  7/11/23: C3 TH  8/01/23: C4 TH  8/22/23: C5  Herceptin  9/12/23: C6 Herceptin  10/3/23: C7 Herceptin, completed RT   10/23/23: C8 Herceptin  11/13/23: C9 Herceptin  12/4/23: C10 Herceptin   12/26/23: C11 Herceptin  1/15/24: C12 Herceptin  2/5/24: C13 Herceptin  3/25/24: C14 Herceptin  4/15/24: C15 Herceptin  5/6/24: C16 Herceptin      History of Present Illness:   Fabienne Rodriguez is a 53 year old patient of Dr. Treviño's with breast cancer as above. She is receiving maintenance herceptin. She presents today for consideration of her next treatment. She is feeling well overall, no new complaints. She has been tolerating treatment thus far, she has two more treatments to go.  She denies any fevers or recent infections, no sob or cp.   She reports nausea and indigestion last week, no vomiting, took antiemetics with relief. Bowels are okay.  Appetite remains at baseline  No pain complaints  Denies any breast changes, no new lumps or masses, no drainage.       Problem List:  Patient Active Problem List   Diagnosis    Malignant neoplasm of central portion of left breast in female, estrogen receptor negative (HCC)    Encounter for monitoring cardiotoxic drug therapy    Anemia due to chronic kidney disease    Iron deficiency anemia due to chronic blood loss    S/P lumpectomy, left breast    Chemotherapy management, encounter for    Neutropenia (HCC)    Ductal carcinoma in situ (DCIS) of left breast    Chemotherapy-induced peripheral neuropathy (HCC)        Medical History:  Past Medical History:    Cancer (HCC)    BREAST CANCER,LEFT    High blood pressure    High cholesterol    Hyperlipidemia    Hypertension    Wears dentures       Surgical History:  Past Surgical History:    Procedure Laterality Date    Breast biopsy  2023          x 2    Chemotherapy      Lumpectomy left  2023    invasive ductal    Needle biopsy left  2023    DCIS, invasive ductal in lumpex specimen    Needle biopsy left  2023    MRbx, fibroadenoma    Radiation left         Allergies:  No Known Allergies    Family History:  Family History   Problem Relation Age of Onset    Breast Cancer Self        Social History:  Social History     Socioeconomic History    Marital status:      Spouse name: Not on file    Number of children: 2    Years of education: Not on file    Highest education level: Not on file   Occupational History    Not on file   Tobacco Use    Smoking status: Never    Smokeless tobacco: Never   Vaping Use    Vaping status: Never Used   Substance and Sexual Activity    Alcohol use: Yes     Comment: occasional wine    Drug use: Never    Sexual activity: Not on file   Other Topics Concern    Not on file   Social History Narrative     - lives with  and child    2 children     Social Determinants of Health     Financial Resource Strain: Not on file   Food Insecurity: Not on file   Transportation Needs: Not on file   Physical Activity: Not on file   Stress: Not on file   Social Connections: Not on file   Housing Stability: Not on file       Medications:    Current Outpatient Medications:     Multiple Vitamin (MULTI-VITAMIN DAILY) Oral Tab, Take by mouth daily., Disp: , Rfl:     atenolol 50 MG Oral Tab, Take 1 tablet (50 mg total) by mouth daily., Disp: , Rfl:     atorvastatin 20 MG Oral Tab, Take 1 tablet (20 mg total) by mouth daily., Disp: , Rfl:     Ferrous Sulfate (IRON) 325 (65 Fe) MG Oral Tab, Take by mouth daily., Disp: , Rfl:     hydrocortisone 2.5 % External Cream, Apply 1 Application topically 2 (two) times daily. For facial rash (Patient not taking: Reported on 2024), Disp: 20 g, Rfl: 0    Review of Systems:  A comprehensive 14 point review of systems was  completed.  Pertinent positives and negatives noted in the HPI.    Performance Status: ECOG 0 - Fully active, able to carry on all predisease activities without restrictions.     Physical Examination:  Vital Signs: Height: 167.6 cm (5' 5.98\") (05/06 1034)  Weight: 82 kg (180 lb 12.8 oz) (05/06 1034)  BSA (Calculated - sq m): 1.92 sq meters (05/06 1034)  Pulse: 55 (05/06 1129)  BP: 125/85 (05/06 1129)  Temp: 97.7 °F (36.5 °C) (05/06 1034)  Do Not Use - Resp Rate: --  SpO2: 100 % (05/06 1034)    GENERAL:  Well developed, well nourished, no acute distress  RESPIRATORY: Effortless breathing; symmetric expansion and BS. No crackles or wheezes.   CARDIOVASCULAR: Regular, normal rate, S1, S2 normal, no peripheral edema   ABDOMEN: Soft, non-distended, nontender, no hepatosplenomegaly, BS normal  MUSCULOSKELETAL: No deformities or joint swelling, normal gait  EXTREMITIES: no limb erythema or edema; No cyanosis  NEUROLOGIC: Alert and oriented x 3; appropriate affect; no focal deficits   SKIN: No rash, petechiae or purpura; no jaundice  PSYCH: mood and affect appropriate   BREAST: no axillary LAD bilaterally, left breast with healed surgical scar. Right breast is within normal limits. No masses or LAD.       Labs:     Recent Results (from the past 72 hour(s))   Comp Metabolic Panel (14)    Collection Time: 05/06/24 10:34 AM   Result Value Ref Range    Glucose 117 (H) 70 - 99 mg/dL    Sodium 138 136 - 145 mmol/L    Potassium 3.6 3.5 - 5.1 mmol/L    Chloride 108 98 - 112 mmol/L    CO2 26.0 21.0 - 32.0 mmol/L    Anion Gap 4 0 - 18 mmol/L    BUN 9 9 - 23 mg/dL    Creatinine 0.72 0.55 - 1.02 mg/dL    Calcium, Total 9.3 8.5 - 10.1 mg/dL    Calculated Osmolality 286 275 - 295 mOsm/kg    eGFR-Cr 100 >=60 mL/min/1.73m2    AST 38 (H) 15 - 37 U/L    ALT 49 13 - 56 U/L    Alkaline Phosphatase 69 41 - 108 U/L    Bilirubin, Total 0.3 0.1 - 2.0 mg/dL    Total Protein 8.4 (H) 6.4 - 8.2 g/dL    Albumin 3.3 (L) 3.4 - 5.0 g/dL    Globulin  5.1  (H) 2.8 - 4.4 g/dL    A/G Ratio 0.6 (L) 1.0 - 2.0    Patient Fasting for CMP? Patient not present    CBC W/ DIFFERENTIAL    Collection Time: 05/06/24 10:34 AM   Result Value Ref Range    WBC 3.0 (L) 4.0 - 11.0 x10(3) uL    RBC 3.99 3.80 - 5.30 x10(6)uL    HGB 12.3 12.0 - 16.0 g/dL    HCT 35.5 35.0 - 48.0 %    .0 150.0 - 450.0 10(3)uL    MCV 89.0 80.0 - 100.0 fL    MCH 30.8 26.0 - 34.0 pg    MCHC 34.6 31.0 - 37.0 g/dL    RDW 11.5 %    Neutrophil Absolute Prelim 0.99 (L) 1.50 - 7.70 x10 (3) uL    Neutrophil Absolute 0.99 (L) 1.50 - 7.70 x10(3) uL    Lymphocyte Absolute 1.66 1.00 - 4.00 x10(3) uL    Monocyte Absolute 0.28 0.10 - 1.00 x10(3) uL    Eosinophil Absolute 0.05 0.00 - 0.70 x10(3) uL    Basophil Absolute 0.02 0.00 - 0.20 x10(3) uL    Immature Granulocyte Absolute 0.00 0.00 - 1.00 x10(3) uL    Neutrophil % 33.0 %    Lymphocyte % 55.3 %    Monocyte % 9.3 %    Eosinophil % 1.7 %    Basophil % 0.7 %    Immature Granulocyte % 0.0 %       Impression/Plan    Left breast cancer:  S/p left lumpectomy and SLNB 5/5/23  S/p 12 weeks of weekly taxol + herceptin  Completed adjuvant RT  Now on maintenance herceptin q3 weeks, plan to complete 1 year. Tolerating well.   Last echo was in January, repeat in May 2024, scheduled for  5/13.  Repeat left mammogram in May 2024 - reminded her to schedule this.   Labs reviewed, will proceed with C16 treatment today as planned.  Reconstruction surgery scheduled for  3/5/2025.       Neutropenia:  Mild, at baseline   No fevers or signs of infection, she knows to call if she develops symptoms.   Continue to monitor      Planned Follow Up: 3 weeks      Risk Level: HIGH, breast cancer, receiving chemotherapy requiring close monitoring     The 21st Century Cures Act makes medical notes like these available to patients in the interest of transparency. Please be advised this is a medical document. Medical documents are intended to carry relevant information, facts as evident, and the  clinical opinion of the practitioner. The medical note is intended as peer to peer communication and may appear blunt or direct. It is written in medical language and may contain abbreviations or verbiage that are unfamiliar.     Electronically Signed by:    ROMANA Christianson-BC  Nurse Practitioner  [facility]

## 2024-05-06 NOTE — PROGRESS NOTES
Pt here for C16D1 Drug(s)trazimera.  Arrives Ambulating independently, accompanied by Self     Patient was evaluated today by GORDY.    Oral medications included in this regimen:  no    Patient confirms comprehension of cancer treatment schedule:  yes    Pregnancy screening:  Not applicable    Modifications in dose or schedule:  No    Medications appearance and physical integrity checked by RN: yes.    Chemotherapy IV pump settings verified by 2 RNs:  n/a - cancer treatment injection administered.  Frequency of blood return and site check throughout administration: Prior to administration     Infusion/treatment outcome:  patient tolerated treatment without incident    Education Record    Learner:  Patient  Barriers / Limitations:  None  Method:  Discussion  Education / instructions given:  AVS  Outcome:  Shows understanding    Discharged Home, Ambulating independently, accompanied by:Self    Patient/family verbalized understanding of future appointments: by [employer] messaging    Here for APN,labs, and trazimera. No complaints. Has appts scheduled through last treatment/MD.

## 2024-05-06 NOTE — PROGRESS NOTES
Chief Complaint   Patient presents with    Follow - Up    Chemotherapy     Pt is here for treatment - C16 D1 herceptin is expected. Eating and drinking without issue; has had some nausea over the last week; denies V,D,C. Energy is ok, sleeping ok, denies pain.    Education Record    Learner:  Patient    Disease / Diagnosis: breast cancer    Barriers / Limitations:  None   Comments:    Method:  Brief focused   Comments:    General Topics:  Diet, Medication, Pain, Side effects and symptom management, and Plan of care reviewed   Comments:    Outcome:  Shows understanding   Comments:

## 2024-05-13 ENCOUNTER — HOSPITAL ENCOUNTER (OUTPATIENT)
Dept: CV DIAGNOSTICS | Facility: HOSPITAL | Age: 54
Discharge: HOME | End: 2024-05-13
Attending: INTERNAL MEDICINE

## 2024-05-13 DIAGNOSIS — Z79.899 ENCOUNTER FOR MONITORING CARDIOTOXIC DRUG THERAPY: ICD-10-CM

## 2024-05-13 DIAGNOSIS — C50.112 MALIGNANT NEOPLASM OF CENTRAL PORTION OF LEFT BREAST IN FEMALE, ESTROGEN RECEPTOR NEGATIVE (HCC): ICD-10-CM

## 2024-05-13 DIAGNOSIS — Z17.1 MALIGNANT NEOPLASM OF CENTRAL PORTION OF LEFT BREAST IN FEMALE, ESTROGEN RECEPTOR NEGATIVE (HCC): ICD-10-CM

## 2024-05-13 DIAGNOSIS — Z51.81 ENCOUNTER FOR MONITORING CARDIOTOXIC DRUG THERAPY: ICD-10-CM

## 2024-05-13 PROCEDURE — 93306 TTE W/DOPPLER COMPLETE: CPT | Performed by: INTERNAL MEDICINE

## 2024-05-23 NOTE — PROGRESS NOTES
Cancer Center Progress Note    Patient Name: Fabienne Rodriguez   YOB: 1970   Medical Record Number: [id number]   CSN: [id number]   Date of visit: 5/28/2024   Provider: ADILSON Nick  Referring Physician: Brittany Treviño      NOTE TO PATIENT:  The 21st Century Cures Act makes medical notes like these available to patients in the interest of transparency. Please be advised this is a medical document. Medical documents are intended to carry relevant information, facts as evident, and the clinical opinion of the practitioner. The medical note is intended as peer to peer communication and may appear blunt or direct. It is written in medical language and may contain abbreviations or verbiage that are unfamiliar.    Chief Complaint:  Follow up       Oncologic History   9/2022: patient noted bloody nipple discharge ~2 weeks then stopped     11/2022: mammogram at [facility] revealed an abnormality in the left breast behind the nipple, a biopsy was recommended, but patient had to travel to [facility] for family reasons.      3/24/2023: ultrasound of the retroareolar left breast demonstrated focally dilated ducts with questionable intraluminal soft tissue. A focal lesion at 11 o'clock in that area measuring 0.4 x 0.4 x 0.3 cm. Additional focally dilated duct at 12 o'clock in the left breast measuring 0.5 x 0.3 cm. US guided biopsy of these lesions showed DCIS with multiple foci of at least microinvasion, largest focus of microinvasion measuring 0.9 mm (0.09 cm). ER negative, CA negative, HER2 negative, Ki-67 40%.   Largest focus of DCIS measures 16 mm (1.6 cm), grade 3, solid, cribriform and comedo types, with involvement of sclerosing lesion.     4/6/2023: MRI breast showed enhancement in the retroareolar left breast spans an area of 1.9 x 1.9 x 5.1 cm and corresponds with known DCIS. Area of enhancement in the retroareolar right breast measuring 0.7 x 0.8 x 0.5 cm which MR guided biopsy is recommended.    Mildly prominent bilateral axillary lymph nodes.  These are nonspecific but may be reactive in nature since they are symmetric.      4/14/2023: MRI guided right breast retroareolar biopsy showed fibroadenoma. Negative for malignancy.      5/5/2023: left breast central lumpectomy, surgical margins x5, and sentinel lymph node biopsy with Dr. Heaton.   Final path showed 6 mm of invasive ductal carcinoma, grade 3, ER negative, IA negative, HER2 (2+) amplified by FISH, Ki-67 high 80-90%. 3 sentinel lymph nodes all negative for carcinoma. All margins negative for invasive carcinoma. DCIS spanning an area 2.5 cm, grade 3, all margins negative for DCIS but close superior margin <1 mm.      Adjuvant TH  5/30/23: C1 TH   6/20/23: C2 TH  7/11/23: C3 TH  8/01/23: C4 TH  8/22/23: C5  Herceptin  9/12/23: C6 Herceptin  10/3/23: C7 Herceptin, completed RT   10/23/23: C8 Herceptin  11/13/23: C9 Herceptin  12/4/23: C10 Herceptin   12/26/23: C11 Herceptin  1/15/24: C12 Herceptin  2/5/24: C13 Herceptin  3/25/24: C14 Herceptin   4/15/24: C15 Herceptin  5/6/24: C16 Herceptin     History of Present Illness:  53 year old  patient of Dr. Treviño's  who presents for continuation of trastuzumab. .Her main complaint is difficulty falling asleep for the past few nights.  She is fatigued.  Otherwise no SOB, edema, weight gain, fluid retention.  Eating and drinking well.  No pain.  No bleeding.        Allergies:  No Known Allergies    Reviewed social history, PMH, PSH    Vital Signs:  Height: 167.6 cm (5' 5.98\") (05/28 0917)  Weight: 82.4 kg (181 lb 9.6 oz) (05/28 0917)  BSA (Calculated - sq m): 1.92 sq meters (05/28 0917)  Pulse: 64 (05/28 0917)  BP: 134/85 (05/28 0917)  Temp: 97.5 °F (36.4 °C) (05/28 0917)  Do Not Use - Resp Rate: --  SpO2: 100 % (05/28 0917)        Medications:    Current Outpatient Medications:     Multiple Vitamin (MULTI-VITAMIN DAILY) Oral Tab, Take by mouth daily., Disp: , Rfl:     hydrocortisone 2.5 % External Cream,  Apply 1 Application topically 2 (two) times daily. For facial rash (Patient not taking: Reported on 5/6/2024), Disp: 20 g, Rfl: 0    atenolol 50 MG Oral Tab, Take 1 tablet (50 mg total) by mouth daily., Disp: , Rfl:     atorvastatin 20 MG Oral Tab, Take 1 tablet (20 mg total) by mouth daily., Disp: , Rfl:     Ferrous Sulfate (IRON) 325 (65 Fe) MG Oral Tab, Take by mouth daily., Disp: , Rfl:     Review of Systems:   As in HPI    Physical Examination:  General: Awake, alert, oriented x3, no acute distress.    HEENT:  Anicteric, conjunctivae and sclerae clear, no sinus tenderness, no oropharyngeal lesion/thrush, mucous membranes are moist.  Neck:  Supple, no tenderness, no masses or adenopathy  Lungs:  Clear to auscultation bilaterally  CV:  Regular rate and rhythm, occasional skipped beats otherwise normal cardiac exam.  Abdomen:  Non-distended, normoactive bowel sounds, soft,nontender, no hepatosplenomegaly.  Extremities:  No edema, no tenderness  Neuro:  CN 2-12 intact    Labs:     Component      Latest Ref Rng 5/28/2024   WBC      4.0 - 11.0 x10(3) uL 3.4 (L)    RBC      3.80 - 5.30 x10(6)uL 4.13    Hemoglobin      12.0 - 16.0 g/dL 12.4    Hematocrit      35.0 - 48.0 % 37.6    Platelet Count      150.0 - 450.0 10(3)uL 182.0    MCV      80.0 - 100.0 fL 91.0    MCH      26.0 - 34.0 pg 30.0    MCHC      31.0 - 37.0 g/dL 33.0    RDW      % 12.0    Prelim Neutrophil Abs      1.50 - 7.70 x10 (3) uL 1.29 (L)    Neutrophils Absolute      1.50 - 7.70 x10(3) uL 1.29 (L)    Lymphocytes Absolute      1.00 - 4.00 x10(3) uL 1.66    Monocytes Absolute      0.10 - 1.00 x10(3) uL 0.34    Eosinophils Absolute      0.00 - 0.70 x10(3) uL 0.04    Basophils Absolute      0.00 - 0.20 x10(3) uL 0.01    Immature Granulocyte Absolute      0.00 - 1.00 x10(3) uL 0.01    Neutrophils %      % 38.5    Lymphocytes %      % 49.6    Monocytes %      % 10.1    Eosinophils %      % 1.2    Basophils %      % 0.3    Immature Granulocyte %      % 0.3     Glucose      70 - 99 mg/dL 156 (H)    Sodium      136 - 145 mmol/L 139    Potassium      3.5 - 5.1 mmol/L 4.2    Chloride      98 - 112 mmol/L 107    Carbon Dioxide, Total      21.0 - 32.0 mmol/L 26.0    ANION GAP      0 - 18 mmol/L 6    BUN      9 - 23 mg/dL 10    CREATININE      0.55 - 1.02 mg/dL 0.78    CALCIUM      8.5 - 10.1 mg/dL 9.3    CALCULATED OSMOLALITY      275 - 295 mOsm/kg 290    EGFR      >=60 mL/min/1.73m2 91    AST (SGOT)      15 - 37 U/L 46 (H)    ALT (SGPT)      13 - 56 U/L 49    ALKALINE PHOSPHATASE      41 - 108 U/L 73    Total Bilirubin      0.1 - 2.0 mg/dL 0.4    PROTEIN, TOTAL      6.4 - 8.2 g/dL 8.9 (H)    Albumin      3.4 - 5.0 g/dL 3.5    Globulin      2.8 - 4.4 g/dL 5.4 (H)    A/G Ratio      1.0 - 2.0  0.6 (L)    Patient Fasting for CMP? Patient not present       Legend:  (L) Low  (H) High    5/13/24 CARD TTE STRAIN:  Conclusions:     1. Left ventricle: The cavity size was normal. Wall thickness was normal.      Systolic function was normal. The estimated ejection fraction was 55-60%,      by visual assessment. No diagnostic evidence for regional wall motion      abnormalities. Left ventricular diastolic function parameters were      normal. The Global Longitudinal Strain (GLS) was -23.50%.   2. Right ventricle: The cavity size was normal. Systolic function was      normal. Systolic pressure was at the upper limits of normal.   3. Tricuspid valve: There was mild regurgitation.   4. Pulmonary arteries: Systolic pressure was at the upper limits of normal.      The peak systolic pressure is 31mm Hg. The end-diastolic pressure is 9mm      Hg.   Impressions:  This study is compared with previous dated 01-21-24: Left   ventricular function remains normal. THe previous GLS was -18.2%.     Impression/Plan:      HER2+ LEFT breast cancer:  Proceed with trastuzumab today.  Echo above sufficient for treatment.      Insomnia:  Trial of OTC first.  Discussed trial of melatonin and/or diphenhydramine.       Emotional Well Being:  I have assessed the patient's emotional well-being and any concerns about anxiety or depression.  No acute psychosocial intervention required at this time.  She has some underlying anxiety.  Consider [facility] referral if she has ongoing anxiety concerns.    Risk level:  High-breast cancer on cancer therapy, requiring intervention and close monitoring.    ADILSON Nick  [facility]

## 2024-05-28 ENCOUNTER — OFFICE VISIT (OUTPATIENT)
Dept: HEMATOLOGY/ONCOLOGY | Facility: HOSPITAL | Age: 54
End: 2024-05-28
Attending: INTERNAL MEDICINE

## 2024-05-28 ENCOUNTER — HOSPITAL ENCOUNTER (OUTPATIENT)
Dept: MAMMOGRAPHY | Facility: HOSPITAL | Age: 54
Discharge: HOME | End: 2024-05-28
Attending: INTERNAL MEDICINE
Payer: COMMERCIAL

## 2024-05-28 VITALS
TEMPERATURE: 98 F | RESPIRATION RATE: 16 BRPM | BODY MASS INDEX: 29.19 KG/M2 | SYSTOLIC BLOOD PRESSURE: 134 MMHG | DIASTOLIC BLOOD PRESSURE: 85 MMHG | WEIGHT: 181.63 LBS | OXYGEN SATURATION: 100 % | HEART RATE: 64 BPM | HEIGHT: 65.98 IN

## 2024-05-28 DIAGNOSIS — Z17.1 MALIGNANT NEOPLASM OF CENTRAL PORTION OF LEFT BREAST IN FEMALE, ESTROGEN RECEPTOR NEGATIVE (HCC): ICD-10-CM

## 2024-05-28 DIAGNOSIS — Z98.890 S/P LUMPECTOMY, LEFT BREAST: ICD-10-CM

## 2024-05-28 DIAGNOSIS — Z17.1 MALIGNANT NEOPLASM OF CENTRAL PORTION OF LEFT BREAST IN FEMALE, ESTROGEN RECEPTOR NEGATIVE (HCC): Primary | ICD-10-CM

## 2024-05-28 DIAGNOSIS — C50.112 MALIGNANT NEOPLASM OF CENTRAL PORTION OF LEFT BREAST IN FEMALE, ESTROGEN RECEPTOR NEGATIVE (HCC): Primary | ICD-10-CM

## 2024-05-28 DIAGNOSIS — C50.112 MALIGNANT NEOPLASM OF CENTRAL PORTION OF LEFT BREAST IN FEMALE, ESTROGEN RECEPTOR NEGATIVE (HCC): ICD-10-CM

## 2024-05-28 DIAGNOSIS — Z79.899 ENCOUNTER FOR MONITORING CARDIOTOXIC DRUG THERAPY: Primary | ICD-10-CM

## 2024-05-28 DIAGNOSIS — G47.00 INSOMNIA, UNSPECIFIED TYPE: ICD-10-CM

## 2024-05-28 DIAGNOSIS — Z51.81 ENCOUNTER FOR MONITORING CARDIOTOXIC DRUG THERAPY: Primary | ICD-10-CM

## 2024-05-28 LAB
ALBUMIN SERPL-MCNC: 3.5 G/DL (ref 3.4–5)
ALBUMIN/GLOB SERPL: 0.6 {RATIO} (ref 1–2)
ALP LIVER SERPL-CCNC: 73 U/L
ALT SERPL-CCNC: 49 U/L
ANION GAP SERPL CALC-SCNC: 6 MMOL/L (ref 0–18)
AST SERPL-CCNC: 46 U/L (ref 15–37)
BASOPHILS # BLD AUTO: 0.01 X10(3) UL (ref 0–0.2)
BASOPHILS NFR BLD AUTO: 0.3 %
BILIRUB SERPL-MCNC: 0.4 MG/DL (ref 0.1–2)
BUN BLD-MCNC: 10 MG/DL (ref 9–23)
CALCIUM BLD-MCNC: 9.3 MG/DL (ref 8.5–10.1)
CHLORIDE SERPL-SCNC: 107 MMOL/L (ref 98–112)
CO2 SERPL-SCNC: 26 MMOL/L (ref 21–32)
CREAT BLD-MCNC: 0.78 MG/DL
EGFRCR SERPLBLD CKD-EPI 2021: 91 ML/MIN/1.73M2 (ref 60–?)
EOSINOPHIL # BLD AUTO: 0.04 X10(3) UL (ref 0–0.7)
EOSINOPHIL NFR BLD AUTO: 1.2 %
ERYTHROCYTE [DISTWIDTH] IN BLOOD BY AUTOMATED COUNT: 12 %
GLOBULIN PLAS-MCNC: 5.4 G/DL (ref 2.8–4.4)
GLUCOSE BLD-MCNC: 156 MG/DL (ref 70–99)
HCT VFR BLD AUTO: 37.6 %
HGB BLD-MCNC: 12.4 G/DL
IMM GRANULOCYTES # BLD AUTO: 0.01 X10(3) UL (ref 0–1)
IMM GRANULOCYTES NFR BLD: 0.3 %
LYMPHOCYTES # BLD AUTO: 1.66 X10(3) UL (ref 1–4)
LYMPHOCYTES NFR BLD AUTO: 49.6 %
MCH RBC QN AUTO: 30 PG (ref 26–34)
MCHC RBC AUTO-ENTMCNC: 33 G/DL (ref 31–37)
MCV RBC AUTO: 91 FL
MONOCYTES # BLD AUTO: 0.34 X10(3) UL (ref 0.1–1)
MONOCYTES NFR BLD AUTO: 10.1 %
NEUTROPHILS # BLD AUTO: 1.29 X10 (3) UL (ref 1.5–7.7)
NEUTROPHILS # BLD AUTO: 1.29 X10(3) UL (ref 1.5–7.7)
NEUTROPHILS NFR BLD AUTO: 38.5 %
OSMOLALITY SERPL CALC.SUM OF ELEC: 290 MOSM/KG (ref 275–295)
PLATELET # BLD AUTO: 182 10(3)UL (ref 150–450)
POTASSIUM SERPL-SCNC: 4.2 MMOL/L (ref 3.5–5.1)
PROT SERPL-MCNC: 8.9 G/DL (ref 6.4–8.2)
RBC # BLD AUTO: 4.13 X10(6)UL
SODIUM SERPL-SCNC: 139 MMOL/L (ref 136–145)
WBC # BLD AUTO: 3.4 X10(3) UL (ref 4–11)

## 2024-05-28 PROCEDURE — 77065 DX MAMMO INCL CAD UNI: CPT | Performed by: INTERNAL MEDICINE

## 2024-05-28 PROCEDURE — 85025 COMPLETE CBC W/AUTO DIFF WBC: CPT

## 2024-05-28 PROCEDURE — 77061 BREAST TOMOSYNTHESIS UNI: CPT | Performed by: INTERNAL MEDICINE

## 2024-05-28 PROCEDURE — 80053 COMPREHEN METABOLIC PANEL: CPT

## 2024-05-28 PROCEDURE — 99214 OFFICE O/P EST MOD 30 MIN: CPT | Performed by: CLINICAL NURSE SPECIALIST

## 2024-05-28 PROCEDURE — 96413 CHEMO IV INFUSION 1 HR: CPT

## 2024-05-28 NOTE — PROGRESS NOTES
Patient is here today for follow up with Flory TUCKER for Malignant neoplasm of the left breast. C17D1 Trastuzumab (trazimera). Patient denies pain. C/O not sleeping good the last 3 nights. Medication list,medical history and toxicities were reviewed and updated.     Education Record    Learner:  Patient      Disease / Diagnosis: Malignant neoplasm of the left breast.    Barriers / Limitations:  None   Comments:    Method:  Brief focused, Discussion, Printed material and Reinforcement   Comments:    General Topics:  Medication, Pain, Procedure and Plan of care reviewed   Comments:    Outcome:  Shows understanding   Comments:    Post APN visit. Patient sent to waiting room. Treating RN notified.    AVS provided and follow up reviewed.  Patient instructed to call as needed.

## 2024-05-28 NOTE — PROGRESS NOTES
Pt here for C16\7D1 Drug(s)trazimera.  Arrives Ambulating independently, accompanied by Self     Patient was evaluated today by GORDY.    Oral medications included in this regimen:  no    Patient confirms comprehension of cancer treatment schedule:  yes    Pregnancy screening:  Not applicable    Modifications in dose or schedule:  No    Medications appearance and physical integrity checked by RN: yes.    Chemotherapy IV pump settings verified by 2 RNs:  n/a - cancer treatment injection administered.  Frequency of blood return and site check throughout administration: Prior to administration     Infusion/treatment outcome:  patient tolerated treatment without incident    Education Record    Learner:  Patient  Barriers / Limitations:  None  Method:  Discussion  Education / instructions given:  AVS  Outcome:  Shows understanding    Discharged Home, Ambulating independently, accompanied by:Self    Patient/family verbalized understanding of future appointments: by [employer] messaging    Here for APN,labs, and trazimera. No complaints. Has appts scheduled through last treatment/MD.

## 2024-06-14 ENCOUNTER — OFFICE VISIT (OUTPATIENT)
Dept: HEMATOLOGY/ONCOLOGY | Facility: HOSPITAL | Age: 54
End: 2024-06-14
Attending: INTERNAL MEDICINE
Payer: COMMERCIAL

## 2024-06-14 ENCOUNTER — TELEPHONE (OUTPATIENT)
Dept: HEMATOLOGY/ONCOLOGY | Facility: HOSPITAL | Age: 54
End: 2024-06-14

## 2024-06-14 VITALS
OXYGEN SATURATION: 98 % | WEIGHT: 182.69 LBS | DIASTOLIC BLOOD PRESSURE: 68 MMHG | TEMPERATURE: 98 F | HEART RATE: 59 BPM | SYSTOLIC BLOOD PRESSURE: 109 MMHG | BODY MASS INDEX: 29.36 KG/M2 | RESPIRATION RATE: 16 BRPM | HEIGHT: 65.98 IN

## 2024-06-14 DIAGNOSIS — D05.12 DUCTAL CARCINOMA IN SITU (DCIS) OF LEFT BREAST: ICD-10-CM

## 2024-06-14 DIAGNOSIS — Z51.11 CHEMOTHERAPY MANAGEMENT, ENCOUNTER FOR: ICD-10-CM

## 2024-06-14 DIAGNOSIS — Z98.890 S/P LUMPECTOMY, LEFT BREAST: ICD-10-CM

## 2024-06-14 DIAGNOSIS — Z79.899 ENCOUNTER FOR MONITORING CARDIOTOXIC DRUG THERAPY: ICD-10-CM

## 2024-06-14 DIAGNOSIS — G47.00 INSOMNIA, UNSPECIFIED TYPE: ICD-10-CM

## 2024-06-14 DIAGNOSIS — C50.112 MALIGNANT NEOPLASM OF CENTRAL PORTION OF LEFT BREAST IN FEMALE, ESTROGEN RECEPTOR NEGATIVE (HCC): Primary | ICD-10-CM

## 2024-06-14 DIAGNOSIS — D70.8 OTHER NEUTROPENIA (HCC): ICD-10-CM

## 2024-06-14 DIAGNOSIS — Z17.1 MALIGNANT NEOPLASM OF CENTRAL PORTION OF LEFT BREAST IN FEMALE, ESTROGEN RECEPTOR NEGATIVE (HCC): Primary | ICD-10-CM

## 2024-06-14 DIAGNOSIS — Z51.81 ENCOUNTER FOR MONITORING CARDIOTOXIC DRUG THERAPY: ICD-10-CM

## 2024-06-14 PROCEDURE — 99215 OFFICE O/P EST HI 40 MIN: CPT | Performed by: INTERNAL MEDICINE

## 2024-06-14 PROCEDURE — G2211 COMPLEX E/M VISIT ADD ON: HCPCS | Performed by: INTERNAL MEDICINE

## 2024-06-14 NOTE — PROGRESS NOTES
Hematology Oncology Progress Note    Patient Name: Fabienne Rodriguez   YOB: 1970   Medical Record Number: [id number]   CSN: [id number]     Date of visit: 06/14/24   Provider: Brittany Treviño MD     NOTE TO PATIENT:  The 21st Century Cures Act makes medical notes like these available to patients in the interest of transparency. Please be advised this is a medical document. Medical documents are intended to carry relevant information, facts as evident, and the clinical opinion of the practitioner. The medical note is intended as peer to peer communication and may appear blunt or direct. It is written in medical language and may contain abbreviations or verbiage that are unfamiliar.    Chief Complaint:  Follow up  Breast cancer  Chemotherapy     Oncologic History   9/2022: patient noted bloody nipple discharge ~2 weeks then stopped     11/2022: mammogram at [facility] revealed an abnormality in the left breast behind the nipple, a biopsy was recommended, but patient had to travel to [place] [facility] for family reasons.      3/24/2023: ultrasound of the retroareolar left breast demonstrated focally dilated ducts with questionable intraluminal soft tissue. A focal lesion at 11 o'clock in that area measuring 0.4 x 0.4 x 0.3 cm. Additional focally dilated duct at 12 o'clock in the left breast measuring 0.5 x 0.3 cm. US guided biopsy of these lesions showed DCIS with multiple foci of at least microinvasion, largest focus of microinvasion measuring 0.9 mm (0.09 cm). ER negative, DE negative, HER2 negative, Ki-67 40%.   Largest focus of DCIS measures 16 mm (1.6 cm), grade 3, solid, cribriform and comedo types, with involvement of sclerosing lesion.     4/6/2023: MRI breast showed enhancement in the retroareolar left breast spans an area of 1.9 x 1.9 x 5.1 cm and corresponds with known DCIS. Area of enhancement in the retroareolar right breast measuring 0.7 x 0.8 x 0.5 cm which MR guided biopsy is recommended.   Mildly  prominent bilateral axillary lymph nodes.  These are nonspecific but may be reactive in nature since they are symmetric.      4/14/2023: MRI guided right breast retroareolar biopsy showed fibroadenoma. Negative for malignancy.      5/5/2023: left breast central lumpectomy, surgical margins x5, and sentinel lymph node biopsy with Dr. Heaton.   Final path showed 6 mm of invasive ductal carcinoma, grade 3, ER negative, ND negative, HER2 (2+) amplified by FISH, Ki-67 high 80-90%. 3 sentinel lymph nodes all negative for carcinoma. All margins negative for invasive carcinoma. DCIS spanning an area 2.5 cm, grade 3, all margins negative for DCIS but close superior margin <1 mm.      Adjuvant TH  5/30/23: C1 TH   6/20/23: C2 TH  7/11/23: C3 TH  8/01/23: C4 TH  8/22/23: C5  Herceptin  9/12/23: C6 Herceptin  10/3/23: C7 Herceptin, completed RT   10/23/23: C8 Herceptin  11/13/23: C9 Herceptin  12/4/23: C10 Herceptin   12/26/23: C11 Herceptin  1/15/24: C12 Herceptin  2/5/24: C13 Herceptin  3/25/24: C14 Herceptin   4/15/24: C15 Herceptin  5/6/24: C16 Herceptin   5/27/24: C17 Herceptin   6/17/24: Plan for C18    Interval History:  Here for follow up and prechemo evaluation. C18 and last cycle of herceptin scheduled at [facility] on 6/18.   Overall she is doing well and continues to tolerate therapy with no issues.   Mild fatigue unchanged. Having difficulty sleeping.   No breast changes, lumps/bumps or enlarged nodes. No F/C/N/V/D.      Reviewed social history, PMH, PSH    Review of Systems:   As in HPI    Vital Signs:  Height: 167.6 cm (5' 5.98\") (06/14 1032)  Weight: 82.9 kg (182 lb 11.2 oz) (06/14 1032)  BSA (Calculated - sq m): 1.92 sq meters (06/14 1032)  Pulse: 59 (06/14 1032)  BP: 109/68 (06/14 1032)  Temp: 98 °F (36.7 °C) (06/14 1032)  Do Not Use - Resp Rate: --  SpO2: 98 % (06/14 1032)    Physical Examination:  General: Awake, alert, oriented x3, no acute distress.    HEENT:  Anicteric, conjunctivae and sclerae clear, no  sinus tenderness, no oropharyngeal lesion/thrush, mucous membranes are moist.  Neck:  Supple, no tenderness, no masses or adenopathy  Breast: L breast with central lumpectomy and post RT changes. No palpable masses in either breast. No axillary node bilaterally.   Lungs:  Clear to auscultation bilaterally  CV:  Regular rate and rhythm  Abdomen:  Non-distended, soft, nontender  Extremities:  No edema, no tenderness  Neuro:  CN 2-12 intact    Medications:    Current Outpatient Medications:     Multiple Vitamin (MULTI-VITAMIN DAILY) Oral Tab, Take by mouth daily., Disp: , Rfl:     hydrocortisone 2.5 % External Cream, Apply 1 Application topically 2 (two) times daily. For facial rash, Disp: 20 g, Rfl: 0    atenolol 50 MG Oral Tab, Take 1 tablet (50 mg total) by mouth daily., Disp: , Rfl:     atorvastatin 20 MG Oral Tab, Take 1 tablet (20 mg total) by mouth daily., Disp: , Rfl:     Ferrous Sulfate (IRON) 325 (65 Fe) MG Oral Tab, Take by mouth daily., Disp: , Rfl:       Labs:  Lab Results   Component Value Date    WBC 3.4 (L) 05/28/2024    RBC 4.13 05/28/2024    HGB 12.4 05/28/2024    HCT 37.6 05/28/2024    MCV 91.0 05/28/2024    MCH 30.0 05/28/2024    MCHC 33.0 05/28/2024    RDW 12.0 05/28/2024    .0 05/28/2024     Lab Results   Component Value Date     05/28/2024    K 4.2 05/28/2024     05/28/2024    CO2 26.0 05/28/2024    BUN 10 05/28/2024    CREATSERUM 0.78 05/28/2024     (H) 05/28/2024    CA 9.3 05/28/2024    ALKPHO 73 05/28/2024    ALT 49 05/28/2024    AST 46 (H) 05/28/2024    BILT 0.4 05/28/2024    ALB 3.5 05/28/2024    TP 8.9 (H) 05/28/2024        Cardiology:    CARD TTE STRAIN: 5/13/24     1. Left ventricle: The cavity size was normal. Wall thickness was normal.      Systolic function was normal. The estimated ejection fraction was 55-60%,      by visual assessment. No diagnostic evidence for regional wall motion      abnormalities. Left ventricular diastolic function parameters were       normal. The Global Longitudinal Strain (GLS) was -23.50%.   2. Right ventricle: The cavity size was normal. Systolic function was      normal. Systolic pressure was at the upper limits of normal.   3. Tricuspid valve: There was mild regurgitation.   4. Pulmonary arteries: Systolic pressure was at the upper limits of normal.      The peak systolic pressure is 31mm Hg. The end-diastolic pressure is 9mm      Hg.     Impressions:  This study is compared with previous dated 01-21-24: Left   ventricular function remains normal. THe previous GLS was -18.2%.     Radiology:  [facility] 2D+3D DIAGNOSTIC [facility] LEFT (CPT=77065/23557)    Result Date: 5/28/2024  CONCLUSION:  Post lumpectomy changes of the left breast.  No mammographic evidence of malignancy in the left breast.  Per routine post lumpectomy follow-up imaging protocol, the patient should return in 6 months for follow-up imaging of the left breast and annual mammography of the right breast.  BI-RADS CATEGORY:  DIAGNOSTIC CATEGORY 2--BENIGN FINDING:   RECOMMENDATIONS:  ROUTINE MAMMOGRAM AND CLINICAL EVALUATION IN 12 MONTHS.   SHORT TERM FOLLOW-UP DIAGNOSTIC MAMMOGRAM BILATERAL BREASTS IN 6 MONTHS.      A letter explaining the results in lay terms has been sent to the patient.  This exam was evaluated with a computer-aided device.  This patient's information has been entered into a reminder system with a target due date for the next mammogram.   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 all margins negative for invasive carcinoma and DCIS, but had close DCIS superior margin <1 mm. re-excision was not recommended.   - s/p 12 weeks of adjuvant TH and adjuvant XRT.   - now on  maintenance herceptin q3 weeks. Tolerating well without toxicities.   - TTE in 5/2024 remains normal.  No need to repeat.   - left diagnostic mammogram 5/2024 benign     Neutropenia:  - mild baseline neutropenia, stable with no s/s infection  - likely benign ethnic neutropenia  - continue to monitor     Anemia:  - likely chemo induced with a component of iron deficiency  - ferritin 42, iron saturation 25%, folate and B12 normal.  - s/p venofer 200 mg x5 with chemo. 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   - on B complex. No need for meds.      Insomnia   - ok for melatonin, benadryl     Plan:  - proceed with C #18 and LAST herceptin on 6/17 at [facility].   - discussed surveillance plan  - bilateral diagnostic mammogram in 11/2024  - follows with surgery and plastics as directed  - port flush today and q3 months  - plan to remove port in March at the time of left breast reconstruction procedure.   - Return in about 24 weeks (around 11/29/2024).

## 2024-06-14 NOTE — TELEPHONE ENCOUNTER
Called patient and LM on  regarding port flush appointment added at 9:00 am to her appointments already scheduled for follow up with Dr. Treviño on 11/21 at 9:20 am.  Asked patient to call if this appointment will not work for her.

## 2024-06-17 ENCOUNTER — OFFICE VISIT (OUTPATIENT)
Dept: HEMATOLOGY/ONCOLOGY | Facility: HOSPITAL | Age: 54
End: 2024-06-17
Attending: INTERNAL MEDICINE
Payer: COMMERCIAL

## 2024-06-17 VITALS
SYSTOLIC BLOOD PRESSURE: 122 MMHG | BODY MASS INDEX: 29.41 KG/M2 | OXYGEN SATURATION: 100 % | HEART RATE: 69 BPM | TEMPERATURE: 98 F | HEIGHT: 65.98 IN | WEIGHT: 183 LBS | DIASTOLIC BLOOD PRESSURE: 83 MMHG | RESPIRATION RATE: 16 BRPM

## 2024-06-17 DIAGNOSIS — Z51.81 ENCOUNTER FOR MONITORING CARDIOTOXIC DRUG THERAPY: ICD-10-CM

## 2024-06-17 DIAGNOSIS — C50.112 MALIGNANT NEOPLASM OF CENTRAL PORTION OF LEFT BREAST IN FEMALE, ESTROGEN RECEPTOR NEGATIVE (HCC): Primary | ICD-10-CM

## 2024-06-17 DIAGNOSIS — Z79.899 ENCOUNTER FOR MONITORING CARDIOTOXIC DRUG THERAPY: ICD-10-CM

## 2024-06-17 DIAGNOSIS — Z17.1 MALIGNANT NEOPLASM OF CENTRAL PORTION OF LEFT BREAST IN FEMALE, ESTROGEN RECEPTOR NEGATIVE (HCC): Primary | ICD-10-CM

## 2024-06-17 LAB
ALBUMIN SERPL-MCNC: 3.1 G/DL (ref 3.4–5)
ALBUMIN/GLOB SERPL: 0.6 {RATIO} (ref 1–2)
ALP LIVER SERPL-CCNC: 66 U/L
ALT SERPL-CCNC: 34 U/L
ANION GAP SERPL CALC-SCNC: 4 MMOL/L (ref 0–18)
AST SERPL-CCNC: 29 U/L (ref 15–37)
BASOPHILS # BLD AUTO: 0.01 X10(3) UL (ref 0–0.2)
BASOPHILS NFR BLD AUTO: 0.4 %
BILIRUB SERPL-MCNC: 0.2 MG/DL (ref 0.1–2)
BUN BLD-MCNC: 9 MG/DL (ref 9–23)
CALCIUM BLD-MCNC: 9.1 MG/DL (ref 8.5–10.1)
CHLORIDE SERPL-SCNC: 110 MMOL/L (ref 98–112)
CO2 SERPL-SCNC: 27 MMOL/L (ref 21–32)
CREAT BLD-MCNC: 0.75 MG/DL
EGFRCR SERPLBLD CKD-EPI 2021: 95 ML/MIN/1.73M2 (ref 60–?)
EOSINOPHIL # BLD AUTO: 0.06 X10(3) UL (ref 0–0.7)
EOSINOPHIL NFR BLD AUTO: 2.2 %
ERYTHROCYTE [DISTWIDTH] IN BLOOD BY AUTOMATED COUNT: 12.8 %
GLOBULIN PLAS-MCNC: 4.9 G/DL (ref 2.8–4.4)
GLUCOSE BLD-MCNC: 114 MG/DL (ref 70–99)
HCT VFR BLD AUTO: 34.2 %
HGB BLD-MCNC: 11.6 G/DL
IMM GRANULOCYTES # BLD AUTO: 0 X10(3) UL (ref 0–1)
IMM GRANULOCYTES NFR BLD: 0 %
LYMPHOCYTES # BLD AUTO: 1.16 X10(3) UL (ref 1–4)
LYMPHOCYTES NFR BLD AUTO: 43.4 %
MCH RBC QN AUTO: 30.7 PG (ref 26–34)
MCHC RBC AUTO-ENTMCNC: 33.9 G/DL (ref 31–37)
MCV RBC AUTO: 90.5 FL
MONOCYTES # BLD AUTO: 0.31 X10(3) UL (ref 0.1–1)
MONOCYTES NFR BLD AUTO: 11.6 %
NEUTROPHILS # BLD AUTO: 1.13 X10 (3) UL (ref 1.5–7.7)
NEUTROPHILS # BLD AUTO: 1.13 X10(3) UL (ref 1.5–7.7)
NEUTROPHILS NFR BLD AUTO: 42.4 %
OSMOLALITY SERPL CALC.SUM OF ELEC: 292 MOSM/KG (ref 275–295)
PLATELET # BLD AUTO: 198 10(3)UL (ref 150–450)
PLATELETS.RETICULATED NFR BLD AUTO: 4 % (ref 0–7)
POTASSIUM SERPL-SCNC: 3.9 MMOL/L (ref 3.5–5.1)
PROT SERPL-MCNC: 8 G/DL (ref 6.4–8.2)
RBC # BLD AUTO: 3.78 X10(6)UL
SODIUM SERPL-SCNC: 141 MMOL/L (ref 136–145)
WBC # BLD AUTO: 2.7 X10(3) UL (ref 4–11)

## 2024-06-17 PROCEDURE — 80053 COMPREHEN METABOLIC PANEL: CPT

## 2024-06-17 PROCEDURE — 85025 COMPLETE CBC W/AUTO DIFF WBC: CPT

## 2024-06-17 PROCEDURE — 96413 CHEMO IV INFUSION 1 HR: CPT

## 2024-06-17 NOTE — PROGRESS NOTES
Pt here for C18D1 Drug(s) Trazimera.  Arrives Ambulating independently, accompanied by Spouse     Patient was evaluated today by Treatment Nurse.    Oral medications included in this regimen:  no    Patient confirms comprehension of cancer treatment schedule:  yes    Pregnancy screening:  Not applicable    Modifications in dose or schedule:  No    Medications appearance and physical integrity checked by RN: yes.    Chemotherapy IV pump settings verified by 2 RNs:  No due to targeted therapy IV administration.  Frequency of blood return and site check throughout administration: Prior to administration and At completion of therapy     Infusion/treatment outcome:  patient tolerated treatment without incident    Education Record    Learner:  Patient, Spouse  Barriers / Limitations:  None  Method:  Discussion  Education / instructions given:  today's regime  Outcome:  Shows understanding    Discharged Home, Ambulating independently, accompanied by:Spouse    Patient/family verbalized understanding of future appointments: by Qbi[employer] messaging    Per pt, Dr Treviño wanted pt to f/u for port flush at [facility].  Appointment requested for 9/16 at [facility] for port flush.

## 2024-07-10 ENCOUNTER — TELEPHONE (OUTPATIENT)
Dept: HEMATOLOGY/ONCOLOGY | Facility: HOSPITAL | Age: 54
End: 2024-07-10

## 2024-07-11 ENCOUNTER — OFFICE VISIT (OUTPATIENT)
Dept: HEMATOLOGY/ONCOLOGY | Facility: HOSPITAL | Age: 54
End: 2024-07-11
Attending: INTERNAL MEDICINE
Payer: COMMERCIAL

## 2024-07-11 DIAGNOSIS — Z71.9 COUNSELING, UNSPECIFIED: ICD-10-CM

## 2024-07-11 DIAGNOSIS — Z08 ENCOUNTER FOR FOLLOW-UP EXAMINATION AFTER COMPLETED TREATMENT FOR MALIGNANT NEOPLASM: Primary | ICD-10-CM

## 2024-07-11 DIAGNOSIS — Z85.3 PERSONAL HISTORY OF BREAST CANCER: ICD-10-CM

## 2024-07-11 PROCEDURE — 99215 OFFICE O/P EST HI 40 MIN: CPT | Performed by: NURSE PRACTITIONER

## 2024-07-11 PROCEDURE — 99417 PROLNG OP E/M EACH 15 MIN: CPT | Performed by: NURSE PRACTITIONER

## 2024-07-11 NOTE — PROGRESS NOTES
I met with Fabienne for a Survivorship Clinic visit to provide a survivorship care plan (SCP) and information related to post-treatment care.   In 9/2022 she noted bloody nipple discharge ~2 weeks then stopped. In 11/2022 she had a mammogram at [facility] which revealed an abnormality in the left breast behind the nipple, a biopsy was recommended, but patient had to travel to [facility] for family reasons. In 3/24/2023 she had an ultrasound of the retroareolar left breast that demonstrated focally dilated ducts with questionable intraluminal soft tissue, a focal lesion at 11 o'clock in that area measuring 0.4 x 0.4 x 0.3 cm and a focally dilated duct at 12 o'clock in the left breast measuring 0.5 x 0.3 cm. US guided biopsy of these lesions showed DCIS with multiple foci of at least microinvasion, largest focus of microinvasion measuring 0.9 mm (0.09 cm). ER negative, SC negative, HER2 negative, Ki-67 40%. Largest focus of DCIS measured 16 mm (1.6 cm), grade 3, solid, cribriform and comedo types, with involvement of sclerosing lesion. On 4/6/2023 an MRI breast was done that showed enhancement in the retroareolar left breast spans an area of 1.9 x 1.9 x 5.1 cm and corresponds with known DCIS. Area of enhancement in the retroareolar right breast measuring 0.7 x 0.8 x 0.5 cm which MR guided biopsy was recommended. Also noted were mildly prominent bilateral axillary lymph nodes, noted as nonspecific but may be reactive in nature since they are symmetric. On 4/14/2023 MRI guided biopsy of the right breast retroareolar was done that showed fibroadenoma and negative for malignancy. On 5/5/2023 Dr. Laureen Heaton performed a left breast central lumpectomy with sentinel lymph node biopsy. Pathology showed 6 mm of invasive ductal carcinoma, grade 3, ER negative, SC negative, HER2 (2+) amplified by FISH, Ki-67 high 80-90%. 3 sentinel lymph nodes all negative for carcinoma with all margins negative for invasive carcinoma,  staging pT1b, pN0, cM0, Stage IA. DCIS spanning an area 2.5 cm, grade 3, all margins negative for DCIS but close superior margin <1 mm. Dr. Brittany Treviño recommended adjuvant chemotherapy with Anti-HER2 therapy with Paclitaxel and Trastuzumab for 4 cycles given 5/30/2023-8/15/2023.  She received radiation therapy with Dr. Anton Hernandez from 9/6/2023-10/3/2023. She continued on Trastuzumab alone that completed on 6/17/2024.She had a post-operative consultation with Dr. Dominic Avery and plans for right mastopexy with left breast scar revision and portacath removal on 3/5/2025. She had a left mammogram in 5/2024 that showed post-lumpectomy changes and no evidence of malignancy.    Fabienne appears to be doing well post-treatment. Her hair is starting to grow back. She notes only mild peripheral neuropathy in her toes. Her energy level has improved.  She has no post-surgical or radiation effects with good ROM and no noted swelling. She notes some hyperpigmentation on her hands and face.     She denies anxiety or depression, but did mention that her mother, who is her usual support, passed away soon after Fabienne's breast cancer diagnosis. She otherwise has few people that she has shared her cancer experience with other than her spouse. She sleeps well. She has started walking outside 30\" 3 times/week and uses her steps inside when the weather is bad. She does no strength training. She has improved her diet recently to include less red meat and processed foods.  Current BMI is 28.     Survivorship Care Plan and letter: She was provided a hard copy of the SCP and a letter that outlined the purpose of the visit and plan.  We reviewed all elements of both documents. She is aware that a letter and SCP will be sent to her PCP, Dr. Lo Jefferson.     Reviewed Cancer Surveillance and that Dr. Treviño will oversee this care.  She will see her next on 11/21/2024 for office visit. She will get portacath flush on 9/16/2024. She was encouraged  to contact Dr. Heaton office to get assigned a surgeon for follow-up exams. She was provided order for bilateral mammogram in November and will call to schedule. Last echo was done in 5/2024 and Dr. Treviño will monitor as needed. She will see Dr. Avery prior to OR in 3/2025.     Reviewed Schedule of other clinic visits with Primary Care and specialists:  Ronny will continue to manage all general health care recommended for age and gender including cancer screening tests.  She has never had colon screening and we discussed screening options. She is also overdue with cervical cancer screening.      Reviewed concerning symptoms that she should report to any Provider.      Reviewed possible late and long-term effects related to the treatment that was received.  We reviewed care of the surgical arm and impact of body image. She will have reconstruction with Dr. Avery in 3/2025.     Reviewed common cancer survivor issues and resources available.  She may benefit from some support from survivorship support groups or individual counseling that she will think about. Exercise and weight loss options were reviewed. Various survivorship resources were provided to use going forward as needed (see below).     Reviewed Lifestyle/behaviors that can affect ongoing health, including the risk for the cancer coming back or developing another cancer.  We reviewed importance of physical activity including aerobic, strength training and weight bearing exercise. We discussed tracking her steps daily with increased exercise. We reviewed a plant based diet.  We reviewed skin care and sun safety.     The patient received a take-home folder that included the following survivorship resources:   -SCP and patient letter  -Breast Survivorship Guide  -[facility] - nutrition and exercise classes, mind/body programs, education, support groups  -[facility]-education, support groups, special programs, nutrition and  exercise classes, movement classes, mind/body programs, survivorship programs, individual counseling  -[facility]  -Medical Fitness Program  -Lymphedema Prevention  -[facility]  -[employer] handout-nutrition, physical activity  -ACS Cancer Screening Guidelines  -Websites: American Cancer Society, Living Beyond Breast Cancer, Facebook: Davian Pond, Shaylee Gagnon, Endocrine [facility]rapies, [employer] for your Life, ACS National Survivor Network, National Coalition for Cancer Survivorship     The patient was given the opportunity to ask questions.  She had a few questions and verbalized understanding of the information we discussed today.  Emotional support was provided. My total time spent caring for the patient on the day of the encounter: 90 minutes (10 minutes reviewing chart, 60 minutes of face to face counseling regarding survivorship education, review of care plan and additional resources and 20 minutes of documentation).  The patient was encouraged to call with any further questions.   ADILSON Guerrero

## 2024-07-16 ENCOUNTER — HOSPITAL ENCOUNTER (OUTPATIENT)
Dept: GENERAL RADIOLOGY | Facility: HOSPITAL | Age: 54
Discharge: HOME | End: 2024-07-16
Attending: INTERNAL MEDICINE
Payer: COMMERCIAL

## 2024-07-16 DIAGNOSIS — N92.6 IRREGULAR MENSTRUAL BLEEDING: ICD-10-CM

## 2024-07-16 DIAGNOSIS — R52 PAIN: ICD-10-CM

## 2024-07-16 PROCEDURE — 73562 X-RAY EXAM OF KNEE 3: CPT | Performed by: INTERNAL MEDICINE

## 2024-07-17 ENCOUNTER — OFFICE VISIT (OUTPATIENT)
Dept: OBGYN CLINIC | Facility: CLINIC | Age: 54
End: 2024-07-17
Payer: COMMERCIAL

## 2024-07-17 ENCOUNTER — HOSPITAL ENCOUNTER (OUTPATIENT)
Dept: ULTRASOUND IMAGING | Facility: HOSPITAL | Age: 54
Discharge: HOME | End: 2024-07-17
Attending: INTERNAL MEDICINE
Payer: COMMERCIAL

## 2024-07-17 VITALS
DIASTOLIC BLOOD PRESSURE: 72 MMHG | BODY MASS INDEX: 30 KG/M2 | WEIGHT: 183 LBS | SYSTOLIC BLOOD PRESSURE: 114 MMHG | HEART RATE: 86 BPM

## 2024-07-17 DIAGNOSIS — N95.0 POST-MENOPAUSAL BLEEDING: Primary | ICD-10-CM

## 2024-07-17 DIAGNOSIS — N92.6 ABNORMAL BLEEDING IN MENSTRUAL CYCLE: ICD-10-CM

## 2024-07-17 PROCEDURE — 76856 US EXAM PELVIC COMPLETE: CPT | Performed by: INTERNAL MEDICINE

## 2024-07-17 PROCEDURE — 99243 OFF/OP CNSLTJ NEW/EST LOW 30: CPT | Performed by: OBSTETRICS & GYNECOLOGY

## 2024-07-17 PROCEDURE — 3074F SYST BP LT 130 MM HG: CPT | Performed by: OBSTETRICS & GYNECOLOGY

## 2024-07-17 PROCEDURE — 76830 TRANSVAGINAL US NON-OB: CPT | Performed by: INTERNAL MEDICINE

## 2024-07-17 PROCEDURE — 3078F DIAST BP <80 MM HG: CPT | Performed by: OBSTETRICS & GYNECOLOGY

## 2024-07-17 NOTE — PROGRESS NOTES
[facility]  Obstetrics and Gynecology  Consultation History & Physical    Fabienne Rodriguez Patient Status:  No patient class for patient encounter    1970 MRN [id number]   Location [facility] [employer] - OB/GYN Attending No att. providers found   Hospital Day 0 PCP Lo Jefferson MD       Subjective:  Chief Complaint   Patient presents with    Vaginal Bleeding     Bleeding after last chemo      53 year old female  presents for consultation requested by Dr. Lo Jefferson due to new onset postmenopausal bleeding.  Patient was having regular monthly menses prior to starting chemotherapy for breast cancer 1 yr ago.  Became amenorrheic during the chemotherapy, but just starting having irregular spotting over the past couple weeks.  Finished the chemo one month ago.  No pain.      Patient's last menstrual period was 2024 (approximate).     Last Pap smear: more than 5 yrs ago     Abnormal Pap: n       Most Recent Immunizations   Administered Date(s) Administered    Covid-19 Vaccine Pfizer Bivalent 30mcg/0.3mL 10/28/2022      reports that she has never smoked. She has never used smokeless tobacco.   reports current alcohol use.    Past Medical History:    Cancer (HCC)    BREAST CANCER,LEFT    High blood pressure    High cholesterol    Hyperlipidemia    Hypertension    Wears dentures     Past Surgical History:   Procedure Laterality Date    Breast biopsy  2023          x 2    Chemotherapy      Lumpectomy left  2023    invasive ductal    Needle biopsy left  2023    DCIS, invasive ductal in lumpex specimen    Needle biopsy left  2023    MRbx, fibroadenoma    Radiation left       Review of Systems:  Pertinent items are noted in the HPI.    Objective:  /72   Pulse 86   Wt 183 lb (83 kg)   LMP 2024 (Approximate)   BMI 29.55 kg/m²    Physical Examination:  General appearance: Well dressed, well nourished in no apparent  distress  Neurologic/Psychiatric: Alert and oriented to person, place and time, mood normal, affect appropriate    Diagnostics:  Ultrasound today  PROCEDURE:  US PELVIS W EV (CPT=76856,59633)  COMPARISON:  None.  INDICATIONS:  N92.6 Abnormal bleeding in menstrual cycle  TECHNIQUE:  Pelvic ultrasound using transabdominal and endovaginal technique.  Transvaginal ultrasound was used to better evaluate adnexal and endometrial detail.  PATIENT STATED HISTORY: (As transcribed by Technologist)  Abnormal bleeding with menstrual cycle  FINDINGS:         UTERUS:  13.55 cm x 8.10 cm x 7.60 cm    Endometrium Thickness:  1.2 cm    There is a 4.9 x 4.6 x 3.2 cm posterior uterine body fibroid.    RIGHT OVARY:  5.73 cm x 3.02 cm x 3.35 cm    The right ovary appears normal in size, shape, and echogenicity. No significant masses are identified.  LEFT OVARY:  3.15 cm x 1.48 cm x 2.86 cm    The left ovary appears normal in size, shape, and echogenicity. No significant masses are identified.  CUL-DE-SAC:  No free fluid   Impression   CONCLUSION:  Endometrial stripe is prominent measuring 1.2 cm.  There is a 4.9 x 3.2 x 4.6 cm posterior uterine body fibroid.     Assessment/Plan  53 year old female  presents today for consultation due to new onset postmenopausal bleeding.  Endometrial lining is somewhat thickened.  It is possible the patient will resume monthly menses, but would recommend an endometrial biopsy and Pap as a precaution.    Diagnoses and all orders for this visit:    Post-menopausal bleeding       Return for EMB and Pap.      Cc to Dr. Lo Eddy MD  [facility]- Obstetrics & Gynecology  Office [phone]

## 2024-08-09 PROBLEM — Z12.11 SPECIAL SCREENING FOR MALIGNANT NEOPLASM OF COLON: Status: ACTIVE | Noted: 2024-08-09

## 2024-09-13 PROBLEM — Z45.2 ENCOUNTER FOR CARE RELATED TO VASCULAR ACCESS PORT: Status: ACTIVE | Noted: 2024-09-13

## 2024-09-13 RX ORDER — SODIUM CHLORIDE 9 MG/ML
10 INJECTION, SOLUTION INTRAMUSCULAR; INTRAVENOUS; SUBCUTANEOUS ONCE
OUTPATIENT
Start: 2024-09-13

## 2024-09-16 ENCOUNTER — NURSE ONLY (OUTPATIENT)
Dept: HEMATOLOGY/ONCOLOGY | Facility: HOSPITAL | Age: 54
End: 2024-09-16
Attending: INTERNAL MEDICINE
Payer: COMMERCIAL

## 2024-09-16 PROCEDURE — 96523 IRRIG DRUG DELIVERY DEVICE: CPT

## 2024-09-20 ENCOUNTER — OFFICE VISIT (OUTPATIENT)
Dept: OBGYN CLINIC | Facility: CLINIC | Age: 54
End: 2024-09-20
Payer: COMMERCIAL

## 2024-09-20 VITALS
HEART RATE: 82 BPM | SYSTOLIC BLOOD PRESSURE: 112 MMHG | BODY MASS INDEX: 29 KG/M2 | DIASTOLIC BLOOD PRESSURE: 72 MMHG | WEIGHT: 182 LBS

## 2024-09-20 DIAGNOSIS — Z01.812 PRE-PROCEDURAL LABORATORY EXAMINATION: ICD-10-CM

## 2024-09-20 DIAGNOSIS — N95.0 POST-MENOPAUSAL BLEEDING: Primary | ICD-10-CM

## 2024-09-20 DIAGNOSIS — Z12.4 SCREENING FOR CERVICAL CANCER: ICD-10-CM

## 2024-09-20 LAB
CONTROL LINE PRESENT WITH A CLEAR BACKGROUND (YES/NO): YES YES/NO
KIT LOT #: NORMAL NUMERIC
PREGNANCY TEST, URINE: NEGATIVE

## 2024-09-20 PROCEDURE — 87624 HPV HI-RISK TYP POOLED RSLT: CPT | Performed by: OBSTETRICS & GYNECOLOGY

## 2024-09-20 PROCEDURE — 58100 BIOPSY OF UTERUS LINING: CPT | Performed by: OBSTETRICS & GYNECOLOGY

## 2024-09-20 PROCEDURE — 3074F SYST BP LT 130 MM HG: CPT | Performed by: OBSTETRICS & GYNECOLOGY

## 2024-09-20 PROCEDURE — 81025 URINE PREGNANCY TEST: CPT | Performed by: OBSTETRICS & GYNECOLOGY

## 2024-09-20 PROCEDURE — 3078F DIAST BP <80 MM HG: CPT | Performed by: OBSTETRICS & GYNECOLOGY

## 2024-09-20 RX ORDER — ATORVASTATIN CALCIUM 40 MG/1
40 TABLET, FILM COATED ORAL DAILY
COMMUNITY
Start: 2024-07-30

## 2024-09-20 NOTE — PROCEDURES
Endometrial Biopsy Procedure Note    Indication and Diagnosis: Postmenopausal bleeding    Procedure Details   The risks (including infection, bleeding, pain, and uterine perforation) and benefits of the procedure were explained to the patient and informed consent was obtained.    The patient was placed in the dorsal lithotomy position.  Bimanual exam showed the uterus to be in the anteverted position.  The cervix was prepped with betadine solution.  2% Lidocaine jellly was applied to the anterior cervix and a tenaculum placed.    Using sterile technique, endometrial biopsy was performed using the pipelle catheter without complications.  The uterus sounded to 7 cm's.  A small amount of tissue was obtained and sent to pathology for examination.    Pap smear collected as well.    Condition:  Stable    Complications:  None    Plan:  The patient was advised to call for any fever or for prolonged or severe pain or bleeding. She was advised to use ibuprofen as needed for mild to moderate pain. She was advised to avoid vaginal intercourse for 48 hours or until the bleeding has completely stopped.

## 2024-09-23 LAB — HPV E6+E7 MRNA CVX QL NAA+PROBE: NEGATIVE

## 2024-09-26 LAB
.: NORMAL
.: NORMAL

## 2024-11-08 ENCOUNTER — SOCIAL WORK SERVICES (OUTPATIENT)
Dept: HEMATOLOGY/ONCOLOGY | Facility: HOSPITAL | Age: 54
End: 2024-11-08

## 2024-11-11 ENCOUNTER — HOSPITAL ENCOUNTER (OUTPATIENT)
Dept: MAMMOGRAPHY | Facility: HOSPITAL | Age: 54
Discharge: HOME | End: 2024-11-11
Attending: PHYSICIAN ASSISTANT
Payer: COMMERCIAL

## 2024-11-11 DIAGNOSIS — Z98.890 S/P LUMPECTOMY, LEFT BREAST: ICD-10-CM

## 2024-11-11 DIAGNOSIS — C50.112 MALIGNANT NEOPLASM OF CENTRAL PORTION OF LEFT BREAST IN FEMALE, ESTROGEN RECEPTOR NEGATIVE (HCC): ICD-10-CM

## 2024-11-11 DIAGNOSIS — Z17.1 MALIGNANT NEOPLASM OF CENTRAL PORTION OF LEFT BREAST IN FEMALE, ESTROGEN RECEPTOR NEGATIVE (HCC): ICD-10-CM

## 2024-11-11 PROCEDURE — 77062 BREAST TOMOSYNTHESIS BI: CPT | Performed by: PHYSICIAN ASSISTANT

## 2024-11-11 PROCEDURE — 77066 DX MAMMO INCL CAD BI: CPT | Performed by: PHYSICIAN ASSISTANT

## 2024-11-19 ENCOUNTER — HOSPITAL ENCOUNTER (OUTPATIENT)
Dept: GENERAL RADIOLOGY | Facility: HOSPITAL | Age: 54
Discharge: HOME | End: 2024-11-19
Attending: INTERNAL MEDICINE
Payer: COMMERCIAL

## 2024-11-19 DIAGNOSIS — J34.89 SINUS PAIN: ICD-10-CM

## 2024-11-19 PROCEDURE — 70220 X-RAY EXAM OF SINUSES: CPT | Performed by: INTERNAL MEDICINE

## 2024-11-21 ENCOUNTER — OFFICE VISIT (OUTPATIENT)
Dept: HEMATOLOGY/ONCOLOGY | Facility: HOSPITAL | Age: 54
End: 2024-11-21
Attending: INTERNAL MEDICINE
Payer: COMMERCIAL

## 2024-11-21 VITALS
HEART RATE: 66 BPM | TEMPERATURE: 99 F | DIASTOLIC BLOOD PRESSURE: 85 MMHG | SYSTOLIC BLOOD PRESSURE: 125 MMHG | BODY MASS INDEX: 28.82 KG/M2 | OXYGEN SATURATION: 98 % | WEIGHT: 173 LBS | RESPIRATION RATE: 18 BRPM | HEIGHT: 65 IN

## 2024-11-21 DIAGNOSIS — T45.1X5A CHEMOTHERAPY-INDUCED PERIPHERAL NEUROPATHY (HCC): ICD-10-CM

## 2024-11-21 DIAGNOSIS — Z92.21 HISTORY OF ANTINEOPLASTIC CHEMOTHERAPY: ICD-10-CM

## 2024-11-21 DIAGNOSIS — G62.0 CHEMOTHERAPY-INDUCED PERIPHERAL NEUROPATHY (HCC): ICD-10-CM

## 2024-11-21 DIAGNOSIS — D50.0 IRON DEFICIENCY ANEMIA DUE TO CHRONIC BLOOD LOSS: ICD-10-CM

## 2024-11-21 DIAGNOSIS — Z17.1 MALIGNANT NEOPLASM OF CENTRAL PORTION OF LEFT BREAST IN FEMALE, ESTROGEN RECEPTOR NEGATIVE (HCC): Primary | ICD-10-CM

## 2024-11-21 DIAGNOSIS — D05.12 DUCTAL CARCINOMA IN SITU (DCIS) OF LEFT BREAST: ICD-10-CM

## 2024-11-21 DIAGNOSIS — C50.112 MALIGNANT NEOPLASM OF CENTRAL PORTION OF LEFT BREAST IN FEMALE, ESTROGEN RECEPTOR NEGATIVE (HCC): ICD-10-CM

## 2024-11-21 DIAGNOSIS — Z98.890 S/P LUMPECTOMY, LEFT BREAST: ICD-10-CM

## 2024-11-21 DIAGNOSIS — Z08 ENCOUNTER FOR FOLLOW-UP SURVEILLANCE OF BREAST CANCER: ICD-10-CM

## 2024-11-21 DIAGNOSIS — D70.8 CHRONIC BENIGN NEUTROPENIA (HCC): ICD-10-CM

## 2024-11-21 DIAGNOSIS — C50.112 MALIGNANT NEOPLASM OF CENTRAL PORTION OF LEFT BREAST IN FEMALE, ESTROGEN RECEPTOR NEGATIVE (HCC): Primary | ICD-10-CM

## 2024-11-21 DIAGNOSIS — Z17.1 MALIGNANT NEOPLASM OF CENTRAL PORTION OF LEFT BREAST IN FEMALE, ESTROGEN RECEPTOR NEGATIVE (HCC): ICD-10-CM

## 2024-11-21 DIAGNOSIS — Z85.3 ENCOUNTER FOR FOLLOW-UP SURVEILLANCE OF BREAST CANCER: ICD-10-CM

## 2024-11-21 LAB
ALBUMIN SERPL-MCNC: 4.4 G/DL (ref 3.2–4.8)
ALBUMIN/GLOB SERPL: 1.1 {RATIO} (ref 1–2)
ALP LIVER SERPL-CCNC: 60 U/L
ALT SERPL-CCNC: 22 U/L
ANION GAP SERPL CALC-SCNC: 6 MMOL/L (ref 0–18)
AST SERPL-CCNC: 26 U/L (ref ?–34)
BILIRUB SERPL-MCNC: 0.5 MG/DL (ref 0.3–1.2)
BUN BLD-MCNC: 11 MG/DL (ref 9–23)
BUN/CREAT SERPL: 13.9 (ref 10–20)
CALCIUM BLD-MCNC: 10.1 MG/DL (ref 8.7–10.4)
CHLORIDE SERPL-SCNC: 107 MMOL/L (ref 98–112)
CO2 SERPL-SCNC: 28 MMOL/L (ref 21–32)
CREAT BLD-MCNC: 0.79 MG/DL
DEPRECATED HBV CORE AB SER IA-ACNC: 60 NG/ML
EGFRCR SERPLBLD CKD-EPI 2021: 89 ML/MIN/1.73M2 (ref 60–?)
GLOBULIN PLAS-MCNC: 4.1 G/DL (ref 2–3.5)
GLUCOSE BLD-MCNC: 113 MG/DL (ref 70–99)
OSMOLALITY SERPL CALC.SUM OF ELEC: 292 MOSM/KG (ref 275–295)
POTASSIUM SERPL-SCNC: 3.9 MMOL/L (ref 3.5–5.1)
PROT SERPL-MCNC: 8.5 G/DL (ref 5.7–8.2)
SODIUM SERPL-SCNC: 141 MMOL/L (ref 136–145)

## 2024-11-21 PROCEDURE — 82728 ASSAY OF FERRITIN: CPT

## 2024-11-21 PROCEDURE — G2211 COMPLEX E/M VISIT ADD ON: HCPCS | Performed by: INTERNAL MEDICINE

## 2024-11-21 PROCEDURE — 96523 IRRIG DRUG DELIVERY DEVICE: CPT

## 2024-11-21 PROCEDURE — 36591 DRAW BLOOD OFF VENOUS DEVICE: CPT

## 2024-11-21 PROCEDURE — 85025 COMPLETE CBC W/AUTO DIFF WBC: CPT

## 2024-11-21 PROCEDURE — 80053 COMPREHEN METABOLIC PANEL: CPT

## 2024-11-21 PROCEDURE — 99214 OFFICE O/P EST MOD 30 MIN: CPT | Performed by: INTERNAL MEDICINE

## 2024-11-21 NOTE — PROGRESS NOTES
Hematology Oncology Progress Note    Patient Name: Fabienne Rodriguez   YOB: 1970   Medical Record Number: [id number]   CSN: [id number]  Attending Physician: Brittany Treviño MD      Date of Visit: 11/21/2024     NOTE TO PATIENT:  The 21st Century Cures Act makes medical notes like these available to patients in the interest of transparency. Please be advised this is a medical document. Medical documents are intended to carry relevant information, facts as evident, and the clinical opinion of the practitioner. The medical note is intended as peer to peer communication and may appear blunt or direct. It is written in medical language and may contain abbreviations or verbiage that are unfamiliar.    Chief Complaint:  Breast cancer follow up    Oncologic History  9/2022: patient noted bloody nipple discharge ~2 weeks then stopped     11/2022: mammogram at [place] [facility] revealed an abnormality in the left breast behind the nipple, a biopsy was recommended, but patient had to travel to [facility] for family reasons.      3/24/2023: ultrasound of the retroareolar left breast demonstrated focally dilated ducts with questionable intraluminal soft tissue. A focal lesion at 11 o'clock in that area measuring 0.4 x 0.4 x 0.3 cm. Additional focally dilated duct at 12 o'clock in the left breast measuring 0.5 x 0.3 cm. US guided biopsy of these lesions showed DCIS with multiple foci of at least microinvasion, largest focus of microinvasion measuring 0.9 mm (0.09 cm). ER negative, WV negative, HER2 negative, Ki-67 40%.   Largest focus of DCIS measures 16 mm (1.6 cm), grade 3, solid, cribriform and comedo types, with involvement of sclerosing lesion.     4/6/2023: MRI breast showed enhancement in the retroareolar left breast spans an area of 1.9 x 1.9 x 5.1 cm and corresponds with known DCIS. Area of enhancement in the retroareolar right breast measuring 0.7 x 0.8 x 0.5 cm which MR guided biopsy is recommended.   Mildly  prominent bilateral axillary lymph nodes.  These are nonspecific but may be reactive in nature since they are symmetric.      4/14/2023: MRI guided right breast retroareolar biopsy showed fibroadenoma. Negative for malignancy.      5/5/2023: left breast central lumpectomy, surgical margins x5, and sentinel lymph node biopsy with Dr. Heaton.   Final path showed 6 mm of invasive ductal carcinoma, grade 3, ER negative, PA negative, HER2 (2+) amplified by FISH, Ki-67 high 80-90%. 3 sentinel lymph nodes all negative for carcinoma. All margins negative for invasive carcinoma. DCIS spanning an area 2.5 cm, grade 3, all margins negative for DCIS but close superior margin <1 mm.     5/30/23: started adjuvant TH, completed 4 cycles 8/1/23.     8/22/23: maintenance herceptin x1 year. Cycle 18 completed 6/17/24.     10/3/23: completed RT       Interval History:  Here for follow up and review of mammogram. Feeling well, denies any acute issues or complaints.   Energy is good. Denies any breast changes or concerns.   She is having more headache with facial pain and pressures, undergoing work up for sinusitis with her pcp.   Denies any fever, chills, congestion, cough, sore throat. No frequent or chronic infections.   Still having normal and regular periods.     ECOG PS: 0    Reviewed social history, PMH, PSH    Review of Systems:   As in HPI    Vital Signs:  Height: 165.1 cm (5' 5\") (11/21 0911)  Weight: 78.5 kg (173 lb) (11/21 0911)  BSA (Calculated - sq m): 1.86 sq meters (11/21 0911)  Pulse: 66 (11/21 0911)  BP: 125/85 (11/21 0911)  Temp: 98.6 °F (37 °C) (11/21 0911)  Do Not Use - Resp Rate: --  SpO2: 98 % (11/21 0911)    Physical Examination:  General: Awake, alert, oriented x3, no acute distress.    HEENT:  Anicteric, conjunctivae and sclerae clear, no sinus tenderness, no oropharyngeal lesion/thrush, mucous membranes are moist.  Neck:  Supple, no tenderness, no masses or adenopathy  Breast: L breast with central lumpectomy  and post RT changes. No palpable masses in either breast. No axillary node bilaterally.   Lungs:  Clear to auscultation bilaterally  CV:  Regular rate and rhythm  Abdomen:  Non-distended, soft, nontender  Extremities:  No edema, no tenderness  Neuro:  Grossly normla.     Medications:    Current Outpatient Medications:     atorvastatin 40 MG Oral Tab, Take 1 tablet (40 mg total) by mouth daily., Disp: , Rfl:     Multiple Vitamin (MULTI-VITAMIN DAILY) Oral Tab, Take by mouth daily., Disp: , Rfl:     atenolol 50 MG Oral Tab, Take 1 tablet (50 mg total) by mouth daily., Disp: , Rfl:     atorvastatin 20 MG Oral Tab, Take 1 tablet (20 mg total) by mouth daily., Disp: , Rfl:     Ferrous Sulfate (IRON) 325 (65 Fe) MG Oral Tab, Take by mouth daily., Disp: , Rfl:       Labs:  Lab Results   Component Value Date    WBC 2.7 (L) 06/17/2024    RBC 3.78 (L) 06/17/2024    HGB 11.6 (L) 06/17/2024    HCT 34.2 (L) 06/17/2024    MCV 90.5 06/17/2024    MCH 30.7 06/17/2024    MCHC 33.9 06/17/2024    RDW 12.8 06/17/2024    .0 06/17/2024     Lab Results   Component Value Date/Time    WBC 2.7 (L) 06/17/2024 09:27 AM    WBC 3.4 (L) 05/28/2024 09:14 AM    WBC 3.0 (L) 05/06/2024 10:34 AM    WBC 2.8 (L) 04/15/2024 09:43 AM    WBC 2.9 (L) 03/25/2024 08:59 AM    WBC 3.0 (L) 02/05/2024 09:46 AM    WBC 2.7 (L) 01/15/2024 08:57 AM    WBC 4.0 12/26/2023 09:20 AM    WBC 2.6 (L) 12/04/2023 08:32 AM    WBC 2.2 (L) 11/13/2023 10:31 AM     Lab Results   Component Value Date/Time    HGB 11.6 (L) 06/17/2024 09:27 AM    HGB 12.4 05/28/2024 09:14 AM    HGB 12.3 05/06/2024 10:34 AM    HGB 12.6 04/15/2024 09:43 AM    HGB 13.6 03/25/2024 08:59 AM    HGB 12.7 02/05/2024 09:46 AM    HGB 11.7 (L) 01/15/2024 08:57 AM    HGB 12.1 12/26/2023 09:20 AM    HGB 12.7 12/04/2023 08:32 AM    HGB 12.3 11/13/2023 10:31 AM     Lab Results   Component Value Date     06/17/2024    K 3.9 06/17/2024     06/17/2024    CO2 27.0 06/17/2024    BUN 9 06/17/2024     CREATSERUM 0.75 06/17/2024     (H) 06/17/2024    CA 9.1 06/17/2024    ALKPHO 66 06/17/2024    ALT 34 06/17/2024    AST 29 06/17/2024    BILT 0.2 06/17/2024    ALB 3.1 (L) 06/17/2024    TP 8.0 06/17/2024      Lab Results   Component Value Date/Time    JAVIER 190.8 11/13/2023 10:31 AM    JAVIER 42.0 07/03/2023 11:32 AM     Lab Results   Component Value Date/Time    SAT 41 11/13/2023 10:31 AM    SAT 25 07/03/2023 11:32 AM       Radiology:  XR SINUSES, PARANASAL, COMPLETE (MIN 3 VIEWS)(CPT=70220)    Result Date: 11/19/2024  CONCLUSION:    No fluid layering, asymmetry, mass, significant opacification of the paranasal sinuses, no destructive lesion or sign of foreign body.  No nasal cavity mass or obstruction seen.  The bony nasal septum appears near midline.  LOCATION:  [facility]   Dictated by (CST): Lyle Smith MD on 11/19/2024 at 10:57 AM     Finalized by (CST): Lyle Smith MD on 11/19/2024 at 10:58 AM       [facility] TONA 2D+3D DIAGNOSTIC EVE  BILAT (CPT=77066/53059)    Result Date: 11/11/2024  CONCLUSION:  Post lumpectomy changes of the left breast.  No mammographic evidence of malignancy in either breast.  Per routine post lumpectomy follow-up imaging protocol, the patient should return in 6 months for follow-up imaging of the left breast.  BI-RADS CATEGORY:  DIAGNOSTIC CATEGORY 2 - BENIGN FINDING:   RECOMMENDATIONS:  SHORT TERM FOLLOW-UP DIAGNOSTIC MAMMOGRAM LEFT BREAST IN 6 MONTHS.      A letter explaining the results in lay terms has been sent to the patient.  This exam was evaluated with a computer-aided device.  This patient's information has been entered into a reminder system with a target due date for the next mammogram.   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 all margins negative for invasive carcinoma and DCIS, but had close DCIS superior margin <1 mm. re-excision was not recommended.   - s/p adjuvant TH followed by maintenance herceptin x1 yr completed 6/2024. adjuvant XRT completed 10/2023.   - left diagnostic mammogram 5/2024 benign. Bilateral diagnostic mammogram 11/2024 benign with stable post lumpectomy changes in the left breast.   - continue surveillance; repeat left diagnostic mammogram in 6 months.   - port flush q3 months at [facility] - plan to remove the port in March at the time of left breast reconstruction procedure.      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   - on B complex. No need for meds.       Return in about 6 months (around 5/21/2025).       Brittany Treviño MD  Hematology Oncology

## 2024-12-17 ENCOUNTER — EKG ENCOUNTER (OUTPATIENT)
Dept: LAB | Age: 54
End: 2024-12-17
Attending: INTERNAL MEDICINE
Payer: COMMERCIAL

## 2024-12-17 DIAGNOSIS — Z01.818 PRE-OP TESTING: Primary | ICD-10-CM

## 2024-12-17 LAB
ATRIAL RATE: 59 BPM
P AXIS: 72 DEGREES
P-R INTERVAL: 144 MS
Q-T INTERVAL: 416 MS
QRS DURATION: 86 MS
QTC CALCULATION (BEZET): 411 MS
R AXIS: 79 DEGREES
T AXIS: 46 DEGREES
VENTRICULAR RATE: 59 BPM

## 2024-12-17 PROCEDURE — 93005 ELECTROCARDIOGRAM TRACING: CPT

## 2024-12-17 PROCEDURE — 93010 ELECTROCARDIOGRAM REPORT: CPT | Performed by: INTERNAL MEDICINE

## 2024-12-24 ENCOUNTER — HOSPITAL ENCOUNTER (OUTPATIENT)
Dept: CT IMAGING | Age: 54
Discharge: HOME | End: 2024-12-24
Attending: INTERNAL MEDICINE
Payer: COMMERCIAL

## 2024-12-24 DIAGNOSIS — R51.9 HEADACHE: ICD-10-CM

## 2024-12-24 DIAGNOSIS — C50.919 BREAST CANCER (HCC): ICD-10-CM

## 2024-12-24 PROCEDURE — 70450 CT HEAD/BRAIN W/O DYE: CPT | Performed by: INTERNAL MEDICINE

## 2024-12-27 ENCOUNTER — HOSPITAL ENCOUNTER (OUTPATIENT)
Dept: CV DIAGNOSTICS | Age: 54
Discharge: HOME | End: 2024-12-27
Attending: INTERNAL MEDICINE
Payer: COMMERCIAL

## 2024-12-27 DIAGNOSIS — R94.31 ABNORMAL EKG: ICD-10-CM

## 2024-12-27 DIAGNOSIS — Z01.818 PREOP TESTING: ICD-10-CM

## 2024-12-27 PROCEDURE — 93306 TTE W/DOPPLER COMPLETE: CPT | Performed by: INTERNAL MEDICINE

## 2025-01-23 ENCOUNTER — EKG ENCOUNTER (OUTPATIENT)
Dept: LAB | Age: 55
End: 2025-01-23
Attending: INTERNAL MEDICINE
Payer: COMMERCIAL

## 2025-01-23 DIAGNOSIS — R00.1 BRADYCARDIA: Primary | ICD-10-CM

## 2025-01-23 PROCEDURE — 93005 ELECTROCARDIOGRAM TRACING: CPT

## 2025-01-23 PROCEDURE — 93010 ELECTROCARDIOGRAM REPORT: CPT | Performed by: INTERNAL MEDICINE

## 2025-01-24 ENCOUNTER — HOSPITAL ENCOUNTER (OUTPATIENT)
Dept: CV DIAGNOSTICS | Facility: HOSPITAL | Age: 55
Discharge: HOME | End: 2025-01-24
Attending: INTERNAL MEDICINE
Payer: COMMERCIAL

## 2025-01-24 DIAGNOSIS — R00.1 BRADYCARDIA: ICD-10-CM

## 2025-01-24 LAB
ATRIAL RATE: 57 BPM
P AXIS: 76 DEGREES
P-R INTERVAL: 144 MS
Q-T INTERVAL: 434 MS
QRS DURATION: 80 MS
QTC CALCULATION (BEZET): 422 MS
R AXIS: 76 DEGREES
T AXIS: 55 DEGREES
VENTRICULAR RATE: 57 BPM

## 2025-01-24 PROCEDURE — 93225 XTRNL ECG REC<48 HRS REC: CPT | Performed by: INTERNAL MEDICINE

## 2025-01-24 PROCEDURE — 93226 XTRNL ECG REC<48 HR SCAN A/R: CPT | Performed by: INTERNAL MEDICINE

## 2025-01-24 PROCEDURE — 93227 XTRNL ECG REC<48 HR R&I: CPT | Performed by: INTERNAL MEDICINE

## 2025-01-29 ENCOUNTER — TELEPHONE (OUTPATIENT)
Dept: SURGERY | Facility: CLINIC | Age: 55
End: 2025-01-29

## 2025-02-18 ENCOUNTER — TELEPHONE (OUTPATIENT)
Facility: CLINIC | Age: 55
End: 2025-02-18

## 2025-02-18 NOTE — TELEPHONE ENCOUNTER
Contacted patient's PCP office to address medical clearance for upcoming surgery. Staff confirmed patient was seen in the office on 2/5, and has completed CBC, CMP, and EKG. Requested the H & P be faxed over, including a note from Dr. Jefferson clearly stating that the test results were reviewed.

## 2025-02-21 ENCOUNTER — TELEPHONE (OUTPATIENT)
Age: 55
End: 2025-02-21

## 2025-02-21 ENCOUNTER — NURSE ONLY (OUTPATIENT)
Age: 55
End: 2025-02-21
Attending: INTERNAL MEDICINE
Payer: COMMERCIAL

## 2025-02-21 DIAGNOSIS — D50.0 IRON DEFICIENCY ANEMIA DUE TO CHRONIC BLOOD LOSS: Primary | ICD-10-CM

## 2025-02-21 DIAGNOSIS — D50.0 IRON DEFICIENCY ANEMIA DUE TO CHRONIC BLOOD LOSS: ICD-10-CM

## 2025-02-21 LAB
BASOPHILS # BLD AUTO: 0.02 X10(3) UL (ref 0–0.2)
BASOPHILS NFR BLD AUTO: 0.6 %
DEPRECATED HBV CORE AB SER IA-ACNC: 25 NG/ML
DEPRECATED RDW RBC AUTO: 50.7 FL (ref 35.1–46.3)
EOSINOPHIL # BLD AUTO: 0.07 X10(3) UL (ref 0–0.7)
EOSINOPHIL NFR BLD AUTO: 2.1 %
ERYTHROCYTE [DISTWIDTH] IN BLOOD BY AUTOMATED COUNT: 14.5 % (ref 11–15)
HCT VFR BLD AUTO: 28.4 %
HGB BLD-MCNC: 8.9 G/DL
IMM GRANULOCYTES # BLD AUTO: 0.01 X10(3) UL (ref 0–1)
IMM GRANULOCYTES NFR BLD: 0.3 %
IRON SATN MFR SERPL: 19 %
IRON SERPL-MCNC: 59 UG/DL
LYMPHOCYTES # BLD AUTO: 1.23 X10(3) UL (ref 1–4)
LYMPHOCYTES NFR BLD AUTO: 36.9 %
MCH RBC QN AUTO: 30.7 PG (ref 26–34)
MCHC RBC AUTO-ENTMCNC: 31.3 G/DL (ref 31–37)
MCV RBC AUTO: 97.9 FL
MONOCYTES # BLD AUTO: 0.3 X10(3) UL (ref 0.1–1)
MONOCYTES NFR BLD AUTO: 9 %
NEUTROPHILS # BLD AUTO: 1.7 X10 (3) UL (ref 1.5–7.7)
NEUTROPHILS # BLD AUTO: 1.7 X10(3) UL (ref 1.5–7.7)
NEUTROPHILS NFR BLD AUTO: 51.1 %
PLATELET # BLD AUTO: 247 10(3)UL (ref 150–450)
RBC # BLD AUTO: 2.9 X10(6)UL
TOTAL IRON BINDING CAPACITY: 316 UG/DL (ref 250–425)
TRANSFERRIN SERPL-MCNC: 241 MG/DL (ref 250–380)
WBC # BLD AUTO: 3.3 X10(3) UL (ref 4–11)

## 2025-02-21 NOTE — TELEPHONE ENCOUNTER
Called patient and spoke with her and her  per Dr. Treviño - let her know she is iron deficient and Dr. Treviño ordered IV iron for her.   I will send a request to [place] to schedule patient for iron next week.  Discussed patient will need to check labs a few days before her follow up in May.  They stated understanding and plan to have labs done in [place] before follow up in May.

## 2025-02-21 NOTE — PROGRESS NOTES
Patient with recurrent iron deficiency anemia likely secondary to heavy menstrual bleeding. We will arrange for injectafer 750 mg weekly x2 at [facility] and repeat labs in 3 months.

## 2025-02-21 NOTE — TELEPHONE ENCOUNTER
Called and spoke with patient and spouse Dr. Treviño would like patient to have Iron and Ferritin drawn based on low hgb.  They stated understanding.

## 2025-02-25 ENCOUNTER — OFFICE VISIT (OUTPATIENT)
Age: 55
End: 2025-02-25
Attending: INTERNAL MEDICINE
Payer: COMMERCIAL

## 2025-02-25 VITALS
RESPIRATION RATE: 16 BRPM | TEMPERATURE: 98 F | DIASTOLIC BLOOD PRESSURE: 70 MMHG | SYSTOLIC BLOOD PRESSURE: 107 MMHG | OXYGEN SATURATION: 100 % | HEART RATE: 59 BPM

## 2025-02-25 DIAGNOSIS — Z45.2 ENCOUNTER FOR CARE RELATED TO VASCULAR ACCESS PORT: Primary | ICD-10-CM

## 2025-02-25 DIAGNOSIS — C50.112 MALIGNANT NEOPLASM OF CENTRAL PORTION OF LEFT BREAST IN FEMALE, ESTROGEN RECEPTOR NEGATIVE (HCC): ICD-10-CM

## 2025-02-25 DIAGNOSIS — Z17.1 MALIGNANT NEOPLASM OF CENTRAL PORTION OF LEFT BREAST IN FEMALE, ESTROGEN RECEPTOR NEGATIVE (HCC): ICD-10-CM

## 2025-02-25 RX ORDER — SODIUM CHLORIDE 9 MG/ML
10 INJECTION, SOLUTION INTRAMUSCULAR; INTRAVENOUS; SUBCUTANEOUS ONCE
OUTPATIENT
Start: 2025-03-04

## 2025-02-25 NOTE — PROGRESS NOTES
Education Record    Learner:  Patient    Disease / Diagnosis: here for Injectafer #1 of 2    Barriers / Limitations:  None   Comments:    Method:  Brief focused and Discussion   Comments:    General Topics:  Medication, Side effects and symptom management, and Plan of care reviewed   Comments:    Outcome:  Shows understanding   Comments:    Injectafer infused per MD order without incident. Post vitals checked. Reviewed next appointments as previously scheduled. Pt sees Dr Treviño in [place]. Pt states she is having surgery on 3/5. Discharged home in stable condition, no new complaints.

## 2025-03-04 ENCOUNTER — ANESTHESIA EVENT (OUTPATIENT)
Dept: SURGERY | Facility: HOSPITAL | Age: 55
End: 2025-03-04
Payer: COMMERCIAL

## 2025-03-04 ENCOUNTER — OFFICE VISIT (OUTPATIENT)
Age: 55
End: 2025-03-04
Attending: INTERNAL MEDICINE
Payer: COMMERCIAL

## 2025-03-04 VITALS
OXYGEN SATURATION: 100 % | WEIGHT: 173 LBS | BODY MASS INDEX: 28.82 KG/M2 | SYSTOLIC BLOOD PRESSURE: 123 MMHG | HEART RATE: 60 BPM | DIASTOLIC BLOOD PRESSURE: 77 MMHG | HEIGHT: 65 IN | TEMPERATURE: 97 F | RESPIRATION RATE: 16 BRPM

## 2025-03-04 DIAGNOSIS — Z45.2 ENCOUNTER FOR CARE RELATED TO VASCULAR ACCESS PORT: Primary | ICD-10-CM

## 2025-03-04 DIAGNOSIS — Z17.1 MALIGNANT NEOPLASM OF CENTRAL PORTION OF LEFT BREAST IN FEMALE, ESTROGEN RECEPTOR NEGATIVE (HCC): ICD-10-CM

## 2025-03-04 DIAGNOSIS — C50.112 MALIGNANT NEOPLASM OF CENTRAL PORTION OF LEFT BREAST IN FEMALE, ESTROGEN RECEPTOR NEGATIVE (HCC): ICD-10-CM

## 2025-03-04 RX ORDER — SODIUM CHLORIDE 9 MG/ML
10 INJECTION, SOLUTION INTRAMUSCULAR; INTRAVENOUS; SUBCUTANEOUS ONCE
Status: CANCELLED | OUTPATIENT
Start: 2025-03-04

## 2025-03-04 NOTE — ANESTHESIA PREPROCEDURE EVALUATION
PRE-OP EVALUATION    Patient Name: Fabienne Rodriguez    Admit Diagnosis: Malignant neoplasm of central portion of left breast in female, estrogen receptor negative (HCC) [C50.112, Z17.1]    Pre-op Diagnosis: Malignant neoplasm of central portion of left breast in female, estrogen receptor negative (HCC) [C50.112, Z17.1]    Right mastopexy, left breast scar revision, removal of right chest port-a-catheter    Anesthesia Procedure: Right mastopexy, left breast scar revision, removal of right chest port-a-catheter (Right)    Surgeons and Role:     * Dominic Avery MD - Primary    Pre-op vitals reviewed.  Temp: 97.3 °F (36.3 °C)  Pulse: 60  Resp: 16  BP: 123/77  SpO2: 100 %  Body mass index is 28.79 kg/m².    Current medications reviewed.  Hospital Medications:   [COMPLETED] trastuzumab-qyyp (Trazimera) 483 mg in sodium chloride 0.9% 273 mL infusion  6 mg/kg (Treatment Plan Recorded) Intravenous Once       Outpatient Medications:     No medications prior to admission.       Allergies: Patient has no known allergies.      Anesthesia Evaluation    Patient summary reviewed.    Anesthetic Complications  (-) history of anesthetic complications         GI/Hepatic/Renal    Negative GI/hepatic/renal ROS.                             Cardiovascular        Exercise tolerance: good     MET: >4      (+) hypertension and well controlled                                    Endo/Other    Negative endo/other ROS.           (+) anemia                   Pulmonary    Negative pulmonary ROS.                       Neuro/Psych    Negative neuro/psych ROS.             (+) neuromuscular disease             Breast cancer        Past Surgical History:   Procedure Laterality Date    Breast biopsy  2023          x 2    Chemotherapy      Lumpectomy left  2023    invasive ductal    Needle biopsy left  2023    DCIS, invasive ductal in lumpex specimen    Needle biopsy left  2023    MRbx, fibroadenoma    Radiation left       Social  History     Socioeconomic History    Marital status:     Number of children: 2   Tobacco Use    Smoking status: Never    Smokeless tobacco: Never   Vaping Use    Vaping status: Never Used   Substance and Sexual Activity    Alcohol use: Yes     Alcohol/week: 1.0 - 2.0 standard drink of alcohol     Types: 1 - 2 Cans of beer per week     Comment: occasional wine    Drug use: Never    Sexual activity: Yes   Other Topics Concern    Caffeine Concern Yes    Exercise Yes    Seat Belt Yes     History   Drug Use Unknown     Available pre-op labs reviewed.  Lab Results   Component Value Date    WBC 3.3 (L) 02/21/2025    RBC 2.90 (L) 02/21/2025    HGB 8.9 (L) 02/21/2025    HCT 28.4 (L) 02/21/2025    MCV 97.9 02/21/2025    MCH 30.7 02/21/2025    MCHC 31.3 02/21/2025    RDW 14.5 02/21/2025    .0 02/21/2025                 Airway      Mallampati: II  Mouth opening: >3 FB  TM distance: 4 - 6 cm  Neck ROM: full Cardiovascular    Cardiovascular exam normal.         Dental             Pulmonary    Pulmonary exam normal.                 Other findings              ASA: 2   Plan: general  NPO status verified and patient meets guidelines.          Plan/risks discussed with: patient              Plan for GA with SGA placement. A detailed discussion of the risks and benefits of the proposed anesthetic was had in the preoperative area. Specifically, the common intrinsic risks of a general anesthetic were discussed including reasonable postoperative pain expectations for the procedure, nausea/vomiting, dental damage, sore throat and adverse reactions related to medications administered. Questions answered and patient wishes to proceed.       Present on Admission:  **None**

## 2025-03-05 ENCOUNTER — ANESTHESIA (OUTPATIENT)
Dept: SURGERY | Facility: HOSPITAL | Age: 55
End: 2025-03-05
Payer: COMMERCIAL

## 2025-03-05 ENCOUNTER — HOSPITAL ENCOUNTER (OUTPATIENT)
Facility: HOSPITAL | Age: 55
Setting detail: HOSPITAL OUTPATIENT SURGERY
Discharge: HOME | End: 2025-03-05
Attending: SURGERY | Admitting: SURGERY
Payer: COMMERCIAL

## 2025-03-05 VITALS
DIASTOLIC BLOOD PRESSURE: 73 MMHG | BODY MASS INDEX: 29.12 KG/M2 | HEIGHT: 65 IN | HEART RATE: 56 BPM | OXYGEN SATURATION: 100 % | RESPIRATION RATE: 16 BRPM | TEMPERATURE: 97 F | SYSTOLIC BLOOD PRESSURE: 119 MMHG | WEIGHT: 174.81 LBS

## 2025-03-05 DIAGNOSIS — Z17.1 MALIGNANT NEOPLASM OF CENTRAL PORTION OF LEFT BREAST IN FEMALE, ESTROGEN RECEPTOR NEGATIVE (HCC): ICD-10-CM

## 2025-03-05 DIAGNOSIS — C50.112 MALIGNANT NEOPLASM OF CENTRAL PORTION OF LEFT BREAST IN FEMALE, ESTROGEN RECEPTOR NEGATIVE (HCC): ICD-10-CM

## 2025-03-05 DIAGNOSIS — Z98.890 S/P LUMPECTOMY, LEFT BREAST: Primary | ICD-10-CM

## 2025-03-05 LAB — B-HCG UR QL: NEGATIVE

## 2025-03-05 PROCEDURE — 87070 CULTURE OTHR SPECIMN AEROBIC: CPT | Performed by: SURGERY

## 2025-03-05 PROCEDURE — 0JPT0WZ REMOVAL OF TOTALLY IMPLANTABLE VASCULAR ACCESS DEVICE FROM TRUNK SUBCUTANEOUS TISSUE AND FASCIA, OPEN APPROACH: ICD-10-PCS | Performed by: SURGERY

## 2025-03-05 PROCEDURE — 81025 URINE PREGNANCY TEST: CPT

## 2025-03-05 PROCEDURE — 0HQT0ZZ REPAIR RIGHT BREAST, OPEN APPROACH: ICD-10-PCS | Performed by: SURGERY

## 2025-03-05 PROCEDURE — 0JB60ZZ EXCISION OF CHEST SUBCUTANEOUS TISSUE AND FASCIA, OPEN APPROACH: ICD-10-PCS | Performed by: SURGERY

## 2025-03-05 PROCEDURE — 88305 TISSUE EXAM BY PATHOLOGIST: CPT | Performed by: SURGERY

## 2025-03-05 RX ORDER — NALOXONE HYDROCHLORIDE 0.4 MG/ML
0.08 INJECTION, SOLUTION INTRAMUSCULAR; INTRAVENOUS; SUBCUTANEOUS AS NEEDED
Status: DISCONTINUED | OUTPATIENT
Start: 2025-03-05 | End: 2025-03-05

## 2025-03-05 RX ORDER — ACETAMINOPHEN 500 MG
1000 TABLET ORAL ONCE
Status: DISCONTINUED | OUTPATIENT
Start: 2025-03-05 | End: 2025-03-05 | Stop reason: HOSPADM

## 2025-03-05 RX ORDER — HYDROMORPHONE HYDROCHLORIDE 1 MG/ML
0.4 INJECTION, SOLUTION INTRAMUSCULAR; INTRAVENOUS; SUBCUTANEOUS EVERY 5 MIN PRN
Status: DISCONTINUED | OUTPATIENT
Start: 2025-03-05 | End: 2025-03-05

## 2025-03-05 RX ORDER — HYDROCODONE BITARTRATE AND ACETAMINOPHEN 5; 325 MG/1; MG/1
2 TABLET ORAL ONCE AS NEEDED
Status: COMPLETED | OUTPATIENT
Start: 2025-03-05 | End: 2025-03-05

## 2025-03-05 RX ORDER — HYDROCODONE BITARTRATE AND ACETAMINOPHEN 5; 325 MG/1; MG/1
1-2 TABLET ORAL EVERY 6 HOURS PRN
Qty: 40 TABLET | Refills: 0 | Status: SHIPPED | OUTPATIENT
Start: 2025-03-05

## 2025-03-05 RX ORDER — LIDOCAINE HYDROCHLORIDE 10 MG/ML
INJECTION, SOLUTION EPIDURAL; INFILTRATION; INTRACAUDAL; PERINEURAL AS NEEDED
Status: DISCONTINUED | OUTPATIENT
Start: 2025-03-05 | End: 2025-03-05 | Stop reason: SURG

## 2025-03-05 RX ORDER — NEOSTIGMINE METHYLSULFATE 1 MG/ML
INJECTION INTRAVENOUS AS NEEDED
Status: DISCONTINUED | OUTPATIENT
Start: 2025-03-05 | End: 2025-03-05 | Stop reason: SURG

## 2025-03-05 RX ORDER — BUPIVACAINE HYDROCHLORIDE 5 MG/ML
INJECTION, SOLUTION EPIDURAL; INTRACAUDAL AS NEEDED
Status: DISCONTINUED | OUTPATIENT
Start: 2025-03-05 | End: 2025-03-05 | Stop reason: HOSPADM

## 2025-03-05 RX ORDER — SODIUM CHLORIDE, SODIUM LACTATE, POTASSIUM CHLORIDE, CALCIUM CHLORIDE 600; 310; 30; 20 MG/100ML; MG/100ML; MG/100ML; MG/100ML
INJECTION, SOLUTION INTRAVENOUS CONTINUOUS
Status: DISCONTINUED | OUTPATIENT
Start: 2025-03-05 | End: 2025-03-05

## 2025-03-05 RX ORDER — KETOROLAC TROMETHAMINE 30 MG/ML
INJECTION, SOLUTION INTRAMUSCULAR; INTRAVENOUS AS NEEDED
Status: DISCONTINUED | OUTPATIENT
Start: 2025-03-05 | End: 2025-03-05 | Stop reason: SURG

## 2025-03-05 RX ORDER — ROCURONIUM BROMIDE 10 MG/ML
INJECTION, SOLUTION INTRAVENOUS AS NEEDED
Status: DISCONTINUED | OUTPATIENT
Start: 2025-03-05 | End: 2025-03-05 | Stop reason: SURG

## 2025-03-05 RX ORDER — DIPHENHYDRAMINE HYDROCHLORIDE 50 MG/ML
12.5 INJECTION INTRAMUSCULAR; INTRAVENOUS AS NEEDED
Status: DISCONTINUED | OUTPATIENT
Start: 2025-03-05 | End: 2025-03-05

## 2025-03-05 RX ORDER — HYDROCODONE BITARTRATE AND ACETAMINOPHEN 5; 325 MG/1; MG/1
1-2 TABLET ORAL EVERY 4 HOURS PRN
Qty: 20 TABLET | Refills: 0 | Status: SHIPPED | OUTPATIENT
Start: 2025-03-05

## 2025-03-05 RX ORDER — DEXAMETHASONE SODIUM PHOSPHATE 4 MG/ML
VIAL (ML) INJECTION AS NEEDED
Status: DISCONTINUED | OUTPATIENT
Start: 2025-03-05 | End: 2025-03-05 | Stop reason: SURG

## 2025-03-05 RX ORDER — PROCHLORPERAZINE EDISYLATE 5 MG/ML
5 INJECTION INTRAMUSCULAR; INTRAVENOUS EVERY 8 HOURS PRN
Status: DISCONTINUED | OUTPATIENT
Start: 2025-03-05 | End: 2025-03-05

## 2025-03-05 RX ORDER — SCOPOLAMINE 1 MG/3D
1 PATCH, EXTENDED RELEASE TRANSDERMAL ONCE
Status: DISCONTINUED | OUTPATIENT
Start: 2025-03-05 | End: 2025-03-05 | Stop reason: HOSPADM

## 2025-03-05 RX ORDER — MEPERIDINE HYDROCHLORIDE 25 MG/ML
12.5 INJECTION INTRAMUSCULAR; INTRAVENOUS; SUBCUTANEOUS AS NEEDED
Status: DISCONTINUED | OUTPATIENT
Start: 2025-03-05 | End: 2025-03-05

## 2025-03-05 RX ORDER — LIDOCAINE HYDROCHLORIDE AND EPINEPHRINE 10; 10 MG/ML; UG/ML
INJECTION, SOLUTION INFILTRATION; PERINEURAL AS NEEDED
Status: DISCONTINUED | OUTPATIENT
Start: 2025-03-05 | End: 2025-03-05 | Stop reason: HOSPADM

## 2025-03-05 RX ORDER — ACETAMINOPHEN 500 MG
1000 TABLET ORAL ONCE AS NEEDED
Status: COMPLETED | OUTPATIENT
Start: 2025-03-05 | End: 2025-03-05

## 2025-03-05 RX ORDER — GLYCOPYRROLATE 0.2 MG/ML
INJECTION, SOLUTION INTRAMUSCULAR; INTRAVENOUS AS NEEDED
Status: DISCONTINUED | OUTPATIENT
Start: 2025-03-05 | End: 2025-03-05 | Stop reason: SURG

## 2025-03-05 RX ORDER — METOCLOPRAMIDE HYDROCHLORIDE 5 MG/ML
INJECTION INTRAMUSCULAR; INTRAVENOUS AS NEEDED
Status: DISCONTINUED | OUTPATIENT
Start: 2025-03-05 | End: 2025-03-05 | Stop reason: SURG

## 2025-03-05 RX ORDER — DOCUSATE SODIUM 100 MG/1
100 CAPSULE, LIQUID FILLED ORAL 2 TIMES DAILY
Qty: 30 CAPSULE | Refills: 1 | Status: SHIPPED | OUTPATIENT
Start: 2025-03-05

## 2025-03-05 RX ORDER — ONDANSETRON 4 MG/1
4 TABLET, FILM COATED ORAL EVERY 8 HOURS PRN
Qty: 12 TABLET | Refills: 0 | Status: SHIPPED | OUTPATIENT
Start: 2025-03-05

## 2025-03-05 RX ORDER — ONDANSETRON 2 MG/ML
4 INJECTION INTRAMUSCULAR; INTRAVENOUS EVERY 6 HOURS PRN
Status: DISCONTINUED | OUTPATIENT
Start: 2025-03-05 | End: 2025-03-05

## 2025-03-05 RX ORDER — HYDROMORPHONE HYDROCHLORIDE 1 MG/ML
INJECTION, SOLUTION INTRAMUSCULAR; INTRAVENOUS; SUBCUTANEOUS
Status: DISCONTINUED
Start: 2025-03-05 | End: 2025-03-05 | Stop reason: WASHOUT

## 2025-03-05 RX ORDER — ONDANSETRON 2 MG/ML
INJECTION INTRAMUSCULAR; INTRAVENOUS AS NEEDED
Status: DISCONTINUED | OUTPATIENT
Start: 2025-03-05 | End: 2025-03-05 | Stop reason: SURG

## 2025-03-05 RX ORDER — HYDROMORPHONE HYDROCHLORIDE 1 MG/ML
0.6 INJECTION, SOLUTION INTRAMUSCULAR; INTRAVENOUS; SUBCUTANEOUS EVERY 5 MIN PRN
Status: DISCONTINUED | OUTPATIENT
Start: 2025-03-05 | End: 2025-03-05

## 2025-03-05 RX ORDER — HYDROCODONE BITARTRATE AND ACETAMINOPHEN 5; 325 MG/1; MG/1
1 TABLET ORAL ONCE AS NEEDED
Status: COMPLETED | OUTPATIENT
Start: 2025-03-05 | End: 2025-03-05

## 2025-03-05 RX ORDER — HYDROMORPHONE HYDROCHLORIDE 1 MG/ML
0.2 INJECTION, SOLUTION INTRAMUSCULAR; INTRAVENOUS; SUBCUTANEOUS EVERY 5 MIN PRN
Status: DISCONTINUED | OUTPATIENT
Start: 2025-03-05 | End: 2025-03-05

## 2025-03-05 RX ORDER — PROCHLORPERAZINE EDISYLATE 5 MG/ML
INJECTION INTRAMUSCULAR; INTRAVENOUS
Status: COMPLETED
Start: 2025-03-05 | End: 2025-03-05

## 2025-03-05 RX ADMIN — ROCURONIUM BROMIDE 10 MG: 10 INJECTION, SOLUTION INTRAVENOUS at 08:29:00

## 2025-03-05 RX ADMIN — METOCLOPRAMIDE HYDROCHLORIDE 10 MG: 5 INJECTION INTRAMUSCULAR; INTRAVENOUS at 07:36:00

## 2025-03-05 RX ADMIN — GLYCOPYRROLATE 0.4 MG: 0.2 INJECTION, SOLUTION INTRAMUSCULAR; INTRAVENOUS at 09:49:00

## 2025-03-05 RX ADMIN — SODIUM CHLORIDE, SODIUM LACTATE, POTASSIUM CHLORIDE, CALCIUM CHLORIDE: 600; 310; 30; 20 INJECTION, SOLUTION INTRAVENOUS at 07:23:00

## 2025-03-05 RX ADMIN — DEXAMETHASONE SODIUM PHOSPHATE 8 MG: 4 MG/ML VIAL (ML) INJECTION at 07:36:00

## 2025-03-05 RX ADMIN — ONDANSETRON 4 MG: 2 INJECTION INTRAMUSCULAR; INTRAVENOUS at 09:10:00

## 2025-03-05 RX ADMIN — ROCURONIUM BROMIDE 50 MG: 10 INJECTION, SOLUTION INTRAVENOUS at 07:26:00

## 2025-03-05 RX ADMIN — SODIUM CHLORIDE, SODIUM LACTATE, POTASSIUM CHLORIDE, CALCIUM CHLORIDE: 600; 310; 30; 20 INJECTION, SOLUTION INTRAVENOUS at 09:00:00

## 2025-03-05 RX ADMIN — NEOSTIGMINE METHYLSULFATE 3 MG: 1 INJECTION INTRAVENOUS at 09:49:00

## 2025-03-05 RX ADMIN — LIDOCAINE HYDROCHLORIDE 50 MG: 10 INJECTION, SOLUTION EPIDURAL; INFILTRATION; INTRACAUDAL; PERINEURAL at 07:26:00

## 2025-03-05 RX ADMIN — LIDOCAINE HYDROCHLORIDE 50 MG: 10 INJECTION, SOLUTION EPIDURAL; INFILTRATION; INTRACAUDAL; PERINEURAL at 09:48:00

## 2025-03-05 RX ADMIN — KETOROLAC TROMETHAMINE 30 MG: 30 INJECTION, SOLUTION INTRAMUSCULAR; INTRAVENOUS at 09:10:00

## 2025-03-05 NOTE — ANESTHESIA POSTPROCEDURE EVALUATION
[facility]    Fabienne Rodriguez Patient Status:  Hospital Outpatient Surgery   Age/Gender 54 year old female MRN [id number]   Location [facility] POST ANESTHESIA CARE UNIT Attending Dominic Avery MD   Hosp Day # 0 PCP Lo Jefferson MD       Anesthesia Post-op Note    Right mastopexy, left breast scar revision, removal of right chest port-a-catheter    Procedure Summary       Date: 03/05/25 Room / Location:  MAIN OR 06 / [facility] MAIN OR    Anesthesia Start: 0723 Anesthesia Stop: 1001    Procedure: Right mastopexy, left breast scar revision, removal of right chest port-a-catheter (Bilateral: Breast) Diagnosis:       Malignant neoplasm of central portion of left breast in female, estrogen receptor negative (HCC)      (Malignant neoplasm of central portion of left breast in female, estrogen receptor negative (HCC) [C50.112, Z17.1])    Surgeons: Dominic Avery MD Anesthesiologist: Carlyn Ruiz MD    Anesthesia Type: general ASA Status: 2            Anesthesia Type: general    Vitals Value Taken Time   /88 03/05/25 1002   Temp 98.8 °F (37.1 °C) 03/05/25 1005   Pulse 81 03/05/25 1005   Resp 15 03/05/25 1005   SpO2 100 % 03/05/25 1005   Vitals shown include unfiled device data.        Patient Location: PACU    Anesthesia Type: general    Airway Patency: patent    Postop Pain Control: adequate    Mental Status: mildly sedated but able to meaningfully participate in the post-anesthesia evaluation    Nausea/Vomiting: none    Cardiopulmonary/Hydration status: stable euvolemic    Complications: no apparent anesthesia related complications    Postop vital signs: stable    Comments: Pt breathing comfortably, VSS, pt following commands. Report to RN.     Dental Exam: Unchanged from Preop    Patient to be discharged from PACU when criteria met.

## 2025-03-05 NOTE — DISCHARGE INSTRUCTIONS
Plastic Surgery Home Care Instructions      We hope you were pleased with your care at [facility].  We wish you the best outcome and overall experience with your operation.  These instructions will help to minimize pain, optimize healing, and improve the likelihood of a successful result.    What To Expect  There will be some spotting of the incision lines for the next few days.  Your breasts will be swollen and might feel congested for the next 1-2 weeks  Temporary areas of numbness are typical in the early weeks following a breast procedure.  Normalization of sensation can typically take up to several months following the operation.  Please DO NOT apply heat or ice to the affected area    Bandages (Dressing)  Keep dressings clean and dry  Do not remove your bra unless for hygiene purposes - compression is key - especially at night. You can change to a supportive sports bra or camilsole if this provides good compression and you are more comfortable in that rather than the surgical bra. No underwire.   You are to wear your bra 24/7 for 6 weeks post-operatively.   Reinforce the dressing with insertion of additional padding as needed - this is not required - for your comfort only  Leave white steri-strips in place over incisions.    Bathing/Showers  You can resume showering tomorrow. Let the soap and water run over incisions, do not scrub.   No baths, swimming, or hot tubs until you receive medical permission    Pain Medication: Norco  Take one or two tablets every six hours as needed for pain.  Do not take narcotics, if you do not have pain. You can take Tylenol if you are not taking Norco. DO NOT take both Tylenol and Norco together as there is already Tylenol in the Norco.  Keep stools soft.  Take a stool softener (Metamucil, Milk of Magnesia, Colace).  Eating fruit will also help to prevent constipation    Over-The-Counter Medication  Non-prescription anti-inflammatory medications can also help to ease the  pain.  You can take Aleve or ibuprofen starting 48 hours after surgery  Take as directed on the bottle  Drink a full glass of water with the medication    Home Medication  Resume your home medications as instructed  Do not resume herbal medications for two weeks    Diet  Resume your normal diet    Activity  No strenuous activity or heavy lifting (no lifting over 10 pounds)  No activities that involve your pectoralis muscles (no planks, push-ups, etc)  You can go up and down the stairs as tolerated.   You cannot return to work, if your work requires strenuous activity.  You cannot return to physical exercise, sports, or gym workouts until you are allowed to participate in strenuous activity.    Driving  Do not drive, if you are taking pain medication.    Return to Work or School  You can return to work when you are not taking pain medication, if your work does not involve strenuous activity.  Contact the office, if you need a medical note.     Follow-up Appointment   Our office will call you to set up a time    Questions or Concerns  Call Dr. Avery's office if you experience severe pain not controlled by pain medication, swelling, numbness, tingling, bleeding, fever, or other concerns.   If he is not available, another physician will be able to address your concerns.    Fabienne     Thank you for coming to [facility] for your operation.  The nurses and the anesthesiologist try very hard to make sure you receive the best care possible.  Your trust in them is greatly appreciated.      Thanks so much,  Dr. Gena Polanco, FNP-C  Jo Almazan, FNP-C         You received a drug called Toradol which is an Anti Inflammatory at:9:10 am  If you are allowed to take Anti inflammatories:    Do not take any Anti Inflammatory like Motrin, Aleve or Ibuprophen until after:3:10 pm  Please report any suspected allergic reactions or bleeding issues to your doctor     You have been given a prescription for Norco 5/325  Norco was  Given to you at:11:19 am  Next dose due: 3:19 pm   Take this medication as directed  This medication contains Tylenol (acetaminophen)  Do not take additional Tylenol while taking Norco    Norco is a Narcotic and can be constipating or upset your stomach  Don't take Norco on an empty stomach  Drink plenty of water  Alcoholic beverages should be avoided while taking narcotics

## 2025-03-05 NOTE — BRIEF OP NOTE
Pre-Operative Diagnosis: Malignant neoplasm of central portion of left breast in female, estrogen receptor negative (HCC) [C50.112, Z17.1]     Post-Operative Diagnosis: Malignant neoplasm of central portion of left breast in female, estrogen receptor negative (HCC) [C50.112, Z17.1]      Procedure Performed:   Right mastopexy, left breast scar revision, removal of right chest port-a-catheter    Surgeons and Role:     * Dominic Avery MD - Primary    Assistant(s):  APN: Sunshine Polanco APRN     Surgical Findings: See Dictation     Specimen: 1. Left breast medial tissue 2. Left breast lateral tissue  3. Right breast tissue     Estimated Blood Loss: Blood Output: 10 mL (3/5/2025  9:35 AM)    ADILSON Prabhakar  3/5/2025  9:59 AM

## 2025-03-05 NOTE — OPERATIVE REPORT
[facility]    PATIENT'S NAME: PAPITO CANNON   ATTENDING PHYSICIAN: Dominic Avery M.D.   OPERATING PHYSICIAN: Dominic Avery M.D.   PATIENT ACCOUNT#:   [id number]    LOCATION:  [facility] 8 EDWP 10  MEDICAL RECORD #:   [id number]       YOB: 1970  ADMISSION DATE:       03/05/2025      OPERATION DATE:  03/05/2025    OPERATIVE REPORT      PREOPERATIVE DIAGNOSIS:  History of left breast cancer, status post left breast conservation therapy.  POSTOPERATIVE DIAGNOSIS:  History of left breast cancer, status post left breast conservation therapy.  PROCEDURE:    1.   Revision of left breast scar, totaling 12.5 cm.  2.   Right breast balancing mastopexy.  3.   Removal of indwelling vascular access device.      ASSISTANT:  ADILSON Prabhakar     ANESTHESIA:  General.    ESTIMATED BLOOD LOSS:  Minimal.    COMPLICATIONS:  None.    INDICATIONS:  The patient is a 54-year-old female, who previously underwent a left breast conservation therapy.  She now presents with breast asymmetry and irregularities in the left breast scar.  She has also completed adjuvant therapies and wishes to proceed with removal of her indwelling vascular access device.    OPERATIVE TECHNIQUE:  Informed consent was obtained from the patient.  The risks, benefits, and alternatives were reviewed with the patient preoperatively.  She expressed understanding and wished to proceed.  The patient was marked in the preoperative holding area.  She was then taken to the operating room, properly identified, and placed in a supine position.  Sequential compression devices were placed on bilateral lower extremities.  Intravenous antibiotic prophylaxis was administered.  The patient then underwent successful induction of general anesthesia and endotracheal intubation.  The arms were placed abducted on foam-padded cradles and loosely secured with Kerlix.  The dog-ears at the medial and lateral aspects of the left breast scar had  previously been encompassed in elliptical excisions.  They were infiltrated with 1% lidocaine with epinephrine.  Attention was then turned to the port scar.  This was infiltrated with 1% lidocaine with epinephrine.  The port was addressed first.  The scar was incised, and the capsule was entered with electrocautery.  The port was delivered via the incision, and the tunnel was secured with 3-0 Vicryl figure-of-eight suture which was tied down after the catheter was withdrawn and confirmed to be intact.  The capsule was partially excised with electrocautery, and the wounds were repaired in a layered fashion with 3-0 Vicryl deep sutures and 4-0 Monocryl subcuticular suture.  Attention was then turned to the left breast.  The dog-ears were excised with a scalpel and electrocautery.  They were sent for permanent pathologic analysis as medial and lateral left breast tissue.  The wounds were repaired in a layered fashion with 3-0 Vicryl deep dermal suture and 4-0 Monocryl subcuticular suture.  The length of the excision totaled 12.5 cm.  Next, attention was turned to the right breast.  The new areolar inset was marked with a 45 mm areolar marker.  The periareolar skin was then incised and de-epithelialized.  The patient was then placed in upright position.  The Wise pattern excision was then tailor-tacked and marked.  The patient was turned to supine position.  The horizontal limb of the Wise pattern was excised with a skin-only excision.  A central excision of the right breast parenchyma was then performed with electrocautery.  The nipple-areolar complex was maintained on a superior dermoglandular pedicle.  Next, the flaps were temporarily transposed and stapled in place.  The patient was then placed in the upright position, and good shape and symmetry were noted.  The patient was returned to the supine position.  The medial and lateral pillars were reapproximated with interrupted 2-0 Vicryl sutures.  The incisions were  then closed with 3-0 Vicryl deep dermal suture and 4-0 Monocryl subcuticular suture.  The nipple-areolar complex was well perfused at the completion.  Dermabond and Steri-Strips were placed on the incisions.  Fluff gauze and a surgical bra were applied.  The patient was awakened, extubated, and taken to the recovery area in stable condition.  There were no operative complications.  All needle, sponge, and instrument counts were correct at the end of the procedure.     Dictated By Dominic Avery M.D.  d: 03/05/2025 10:29:02  t: 03/05/2025 12:07:39  [facility] [id number]  [facility]/

## 2025-03-05 NOTE — ANESTHESIA PROCEDURE NOTES
Airway  Date/Time: 3/5/2025 7:31 AM  Urgency: elective    Airway not difficult    General Information and Staff    Patient location during procedure: OR  Anesthesiologist: Carlyn Ruiz MD  Resident/CRNA: Ofelia Cervantes CRNA  Performed: CRNA   Performed by: Ofelia Cervantes CRNA  Authorized by: Carlyn Ruiz MD      Indications and Patient Condition  Indications for airway management: anesthesia  Spontaneous Ventilation: absent  Sedation level: deep  Preoxygenated: yes  Patient position: sniffing  Mask difficulty assessment: 1 - vent by mask    Final Airway Details  Final airway type: endotracheal airway      Successful airway: ETT  Cuffed: yes   Successful intubation technique: direct laryngoscopy  Facilitating devices/methods: intubating stylet  Endotracheal tube insertion site: oral  Blade: Regine  Blade size: #3  ETT size (mm): 7.0    Cormack-Lehane Classification: grade I - full view of glottis  Placement verified by: capnometry   Measured from: lips  ETT to lips (cm): 21  Number of attempts at approach: 1

## 2025-03-05 NOTE — H&P
No change in Dr. Jefferson's H&P from 2/5. We reviewed the plan for left breast scar revision, a right balancing mastopexy, and removal of the patient's chest wall port.  The nature of the procedure was reviewed with the patient and .  The risks of surgery including but not limited to bleeding, infection, scarring, delayed wound healing, asymmetry, loss of nipple sensation, nipple areolar necrosis, fat necrosis resulting in firmness of the breast, hypertrophic scarring or keloid, unexpected pathology requiring further surgery, and need for revision surgery were discussed. We reviewed the expected postoperative course including need for activity limitation and compression.  Multiple questions were answered to patient's satisfaction.  No guarantees as to outcome were offered.  The patient expresses understanding wishes to proceed

## 2025-03-13 ENCOUNTER — OFFICE VISIT (OUTPATIENT)
Dept: SURGERY | Facility: CLINIC | Age: 55
End: 2025-03-13
Payer: COMMERCIAL

## 2025-03-13 DIAGNOSIS — C50.112 MALIGNANT NEOPLASM OF CENTRAL PORTION OF LEFT BREAST IN FEMALE, ESTROGEN RECEPTOR NEGATIVE (HCC): Primary | ICD-10-CM

## 2025-03-13 DIAGNOSIS — Z17.1 MALIGNANT NEOPLASM OF CENTRAL PORTION OF LEFT BREAST IN FEMALE, ESTROGEN RECEPTOR NEGATIVE (HCC): Primary | ICD-10-CM

## 2025-03-13 DIAGNOSIS — Z98.890 S/P LUMPECTOMY, LEFT BREAST: ICD-10-CM

## 2025-03-13 PROCEDURE — 99024 POSTOP FOLLOW-UP VISIT: CPT

## 2025-03-13 NOTE — PROGRESS NOTES
Fabienne Rodriguez is a 54 year old female who presents today for a follow-up after revision of left breast scar, totaling 12.5 cm, right breast balancing mastopexy, and removal of indwelling vascular access device on 3/5/2025 with Dr. Avery.    She denies fever and chills. She denies nausea, vomiting, diarrhea or constipation.   Her pain is controlled. She presents today for post op wound check. She is accompanied by her .    Physical Exam     Breasts: Bilateral breast incisions clean, dry, and intact. No evidence of wound dehiscence or wound drainage. No evidence of hematoma or seroma. No erythema or ecchymosis. Right nipple viable.     Chest: Right chest incision clean, dry, and intact. No evidence of wound dehiscence or wound drainage. No erythema or ecchymosis. Monocryl suture present.    There were no vitals filed for this visit.    Assessment and Plan     Fabienne Rodriguez is doing well after revision of left breast scar, totaling 12.5 cm, right breast balancing mastopexy, and removal of indwelling vascular access device on 3/5/2025 with Dr. Avery.    Right chest incision is clean, dry, and intact. Monocryl suture present and trimmed today. Her bilateral breast incisions are clean, dry, and intact. Steri strips redressed today. She tolerated this well. She can take off the steri strips if they start to come off in the next 10-14 days. We discussed that she may shower, but no baths. No rubbing her incisions and no ointments and lotions to her incisions. We reviewed continued activity restrictions and continued compressions. We reviewed no bouncing activities for 4-6 weeks post op.     She will follow up with Dr. Avery on 4/18/2025 for a post op visit. She was encouraged to contact our office for questions or concerns in the meantime. All her questions were addressed today. She verbalized understanding.    Jo Almazan, APRN  3/13/2025  9:04 AM

## 2025-04-18 ENCOUNTER — OFFICE VISIT (OUTPATIENT)
Dept: SURGERY | Facility: CLINIC | Age: 55
End: 2025-04-18
Payer: COMMERCIAL

## 2025-04-18 DIAGNOSIS — Z98.890 S/P LUMPECTOMY, LEFT BREAST: Primary | ICD-10-CM

## 2025-04-18 PROCEDURE — 99024 POSTOP FOLLOW-UP VISIT: CPT | Performed by: SURGERY

## 2025-04-18 NOTE — PROGRESS NOTES
Fabienne Rodriguez is a 54 year old female who presents today in follow-up after undergoing revision of her left breast scar and a right balancing mastopexy on 3/5.   She denies fever and chills.       Physical Examination:  Breasts:Bilateral breast incisions are clean dry and intact.  There is no erythema or seroma noted.  Acceptable shape and symmetry is noted.      Assessment and Plan:  Patient is doing well.  We discussed scar care including massage and moisturizer and silicone products.  The patient may resume regular activity as tolerated.  She will follow-up in 6 months for scar check.  The plan was reviewed with the patient and questions were answered.

## 2025-05-23 ENCOUNTER — APPOINTMENT (OUTPATIENT)
Age: 55
End: 2025-05-23
Attending: INTERNAL MEDICINE
Payer: COMMERCIAL

## 2025-05-30 ENCOUNTER — OFFICE VISIT (OUTPATIENT)
Age: 55
End: 2025-05-30
Attending: INTERNAL MEDICINE
Payer: COMMERCIAL

## 2025-05-30 ENCOUNTER — NURSE ONLY (OUTPATIENT)
Age: 55
End: 2025-05-30
Attending: INTERNAL MEDICINE
Payer: COMMERCIAL

## 2025-05-30 VITALS
BODY MASS INDEX: 27.49 KG/M2 | DIASTOLIC BLOOD PRESSURE: 86 MMHG | RESPIRATION RATE: 16 BRPM | TEMPERATURE: 98 F | HEIGHT: 65 IN | OXYGEN SATURATION: 98 % | SYSTOLIC BLOOD PRESSURE: 135 MMHG | WEIGHT: 165 LBS | HEART RATE: 58 BPM

## 2025-05-30 DIAGNOSIS — Z98.890 S/P LUMPECTOMY, LEFT BREAST: ICD-10-CM

## 2025-05-30 DIAGNOSIS — D70.8 CHRONIC BENIGN NEUTROPENIA: ICD-10-CM

## 2025-05-30 DIAGNOSIS — C50.112 MALIGNANT NEOPLASM OF CENTRAL PORTION OF LEFT BREAST IN FEMALE, ESTROGEN RECEPTOR NEGATIVE (HCC): Primary | ICD-10-CM

## 2025-05-30 DIAGNOSIS — R77.9 ELEVATED BLOOD PROTEIN: ICD-10-CM

## 2025-05-30 DIAGNOSIS — D50.0 IRON DEFICIENCY ANEMIA DUE TO CHRONIC BLOOD LOSS: ICD-10-CM

## 2025-05-30 DIAGNOSIS — Z08 ENCOUNTER FOR FOLLOW-UP SURVEILLANCE OF BREAST CANCER: ICD-10-CM

## 2025-05-30 DIAGNOSIS — C50.112 MALIGNANT NEOPLASM OF CENTRAL PORTION OF LEFT BREAST IN FEMALE, ESTROGEN RECEPTOR NEGATIVE (HCC): ICD-10-CM

## 2025-05-30 DIAGNOSIS — D05.12 DUCTAL CARCINOMA IN SITU (DCIS) OF LEFT BREAST: ICD-10-CM

## 2025-05-30 DIAGNOSIS — Z17.1 MALIGNANT NEOPLASM OF CENTRAL PORTION OF LEFT BREAST IN FEMALE, ESTROGEN RECEPTOR NEGATIVE (HCC): ICD-10-CM

## 2025-05-30 DIAGNOSIS — Z92.21 HISTORY OF ANTINEOPLASTIC CHEMOTHERAPY: ICD-10-CM

## 2025-05-30 DIAGNOSIS — Z85.3 ENCOUNTER FOR FOLLOW-UP SURVEILLANCE OF BREAST CANCER: ICD-10-CM

## 2025-05-30 DIAGNOSIS — Z17.1 MALIGNANT NEOPLASM OF CENTRAL PORTION OF LEFT BREAST IN FEMALE, ESTROGEN RECEPTOR NEGATIVE (HCC): Primary | ICD-10-CM

## 2025-05-30 LAB
ALBUMIN SERPL-MCNC: 4.7 G/DL (ref 3.2–4.8)
ALBUMIN/GLOB SERPL: 1.3 {RATIO} (ref 1–2)
ALP LIVER SERPL-CCNC: 61 U/L (ref 41–108)
ALT SERPL-CCNC: 28 U/L (ref 10–49)
ANION GAP SERPL CALC-SCNC: 7 MMOL/L (ref 0–18)
AST SERPL-CCNC: 38 U/L (ref ?–34)
BASOPHILS # BLD AUTO: 0.02 X10(3) UL (ref 0–0.2)
BASOPHILS NFR BLD AUTO: 0.7 %
BILIRUB SERPL-MCNC: 0.4 MG/DL (ref 0.3–1.2)
BUN BLD-MCNC: 8 MG/DL (ref 9–23)
BUN/CREAT SERPL: 9.9 (ref 10–20)
CALCIUM BLD-MCNC: 9.6 MG/DL (ref 8.7–10.4)
CHLORIDE SERPL-SCNC: 105 MMOL/L (ref 98–112)
CO2 SERPL-SCNC: 29 MMOL/L (ref 21–32)
CREAT BLD-MCNC: 0.81 MG/DL (ref 0.55–1.02)
DEPRECATED HBV CORE AB SER IA-ACNC: 142 NG/ML (ref 50–306)
DEPRECATED RDW RBC AUTO: 38.7 FL (ref 35.1–46.3)
EGFRCR SERPLBLD CKD-EPI 2021: 86 ML/MIN/1.73M2 (ref 60–?)
EOSINOPHIL # BLD AUTO: 0.05 X10(3) UL (ref 0–0.7)
EOSINOPHIL NFR BLD AUTO: 1.7 %
ERYTHROCYTE [DISTWIDTH] IN BLOOD BY AUTOMATED COUNT: 11.4 % (ref 11–15)
GLOBULIN PLAS-MCNC: 3.7 G/DL (ref 2–3.5)
GLUCOSE BLD-MCNC: 111 MG/DL (ref 70–99)
HCT VFR BLD AUTO: 38.2 % (ref 35–48)
HGB BLD-MCNC: 12.4 G/DL (ref 12–16)
IGA SERPL-MCNC: 359.7 MG/DL (ref 40–350)
IGM SERPL-MCNC: 64.1 MG/DL (ref 50–300)
IMM GRANULOCYTES # BLD AUTO: 0 X10(3) UL (ref 0–1)
IMM GRANULOCYTES NFR BLD: 0 %
IMMUNOGLOBULIN PNL SER-MCNC: 2396 MG/DL (ref 650–1600)
IRON SATN MFR SERPL: 18 % (ref 15–50)
IRON SERPL-MCNC: 55 UG/DL (ref 50–170)
LYMPHOCYTES # BLD AUTO: 1.15 X10(3) UL (ref 1–4)
LYMPHOCYTES NFR BLD AUTO: 38.7 %
MCH RBC QN AUTO: 29.9 PG (ref 26–34)
MCHC RBC AUTO-ENTMCNC: 32.5 G/DL (ref 31–37)
MCV RBC AUTO: 92 FL (ref 80–100)
MONOCYTES # BLD AUTO: 0.31 X10(3) UL (ref 0.1–1)
MONOCYTES NFR BLD AUTO: 10.4 %
NEUTROPHILS # BLD AUTO: 1.44 X10 (3) UL (ref 1.5–7.7)
NEUTROPHILS # BLD AUTO: 1.44 X10(3) UL (ref 1.5–7.7)
NEUTROPHILS NFR BLD AUTO: 48.5 %
OSMOLALITY SERPL CALC.SUM OF ELEC: 291 MOSM/KG (ref 275–295)
PLATELET # BLD AUTO: 229 10(3)UL (ref 150–450)
POTASSIUM SERPL-SCNC: 4.2 MMOL/L (ref 3.5–5.1)
PROT SERPL-MCNC: 8.4 G/DL (ref 5.7–8.2)
RBC # BLD AUTO: 4.15 X10(6)UL (ref 3.8–5.3)
SODIUM SERPL-SCNC: 141 MMOL/L (ref 136–145)
TOTAL IRON BINDING CAPACITY: 307 UG/DL (ref 250–425)
TRANSFERRIN SERPL-MCNC: 239 MG/DL (ref 250–380)
WBC # BLD AUTO: 3 X10(3) UL (ref 4–11)

## 2025-05-30 NOTE — PROGRESS NOTES
[facility] Hematology On[facility]   Progress Note    Patient Name: Fabienne Rodriguez   YOB: 1970   Medical Record Number: [id number]   CSN: [id number]  Attending Physician: Brittany Treviño MD      Fabienne Rodriguez verbally consented to be recorded using ambient AI listening technology and understand that they can each withdraw their consent to this listening technology at any point by asking the clinician to turn off or pause the recording.      Date of Visit: 5/30/2025     Chief Complaint:  Breast cancer follow up    Oncologic History  9/2022: patient noted bloody nipple discharge ~2 weeks then stopped     11/2022: mammogram at [place] [facility] revealed an abnormality in the left breast behind the nipple, a biopsy was recommended, but patient had to travel to [facility] for family reasons.      3/24/2023: ultrasound of the retroareolar left breast demonstrated focally dilated ducts with questionable intraluminal soft tissue. A focal lesion at 11 o'clock in that area measuring 0.4 x 0.4 x 0.3 cm. Additional focally dilated duct at 12 o'clock in the left breast measuring 0.5 x 0.3 cm. US guided biopsy of these lesions showed DCIS with multiple foci of at least microinvasion, largest focus of microinvasion measuring 0.9 mm (0.09 cm). ER negative, NM negative, HER2 negative, Ki-67 40%.   Largest focus of DCIS measures 16 mm (1.6 cm), grade 3, solid, cribriform and comedo types, with involvement of sclerosing lesion.     4/6/2023: MRI breast showed enhancement in the retroareolar left breast spans an area of 1.9 x 1.9 x 5.1 cm and corresponds with known DCIS. Area of enhancement in the retroareolar right breast measuring 0.7 x 0.8 x 0.5 cm which MR guided biopsy is recommended.   Mildly prominent bilateral axillary lymph nodes.  These are nonspecific but may be reactive in nature since they are symmetric.      4/14/2023: MRI guided right breast retroareolar biopsy showed fibroadenoma. Negative for malignancy.       5/5/2023: left breast central lumpectomy, surgical margins x5, and sentinel lymph node biopsy with Dr. Heaton.   Final path showed 6 mm of invasive ductal carcinoma, grade 3, ER negative, KS negative, HER2 (2+) amplified by FISH, Ki-67 high 80-90%. 3 sentinel lymph nodes all negative for carcinoma. All margins negative for invasive carcinoma. DCIS spanning an area 2.5 cm, grade 3, all margins negative for DCIS but close superior margin <1 mm.     5/30/23: started adjuvant TH, completed 4 cycles 8/1/23.     8/22/23: maintenance herceptin x1 year.      10/3/23: completed RT     6/17/24: completed herceptin.     3/5/25: left breast revision, right breast mastopexy, and port removal (Dr. Avery).       Interval History:  History of Present Illness  Fabienne Rodriguez is a 54 year old female with breast cancer who presents for follow-up on breast cancer and iron deficiency anemia management.    She recently underwent breast revision surgery on March 5, 2025, and she is happy with the results. She experiences occasional pain in the right breast at the incision site, but denies current pain, swelling, tingling, or numbness.    She has a history of iron deficiency anemia and received two doses of injectafer in Feb and March with improved energy. Her hemoglobin improved from 8.9 to 12.4 after the infusions. She continues to take oral iron sulfate once a day without any side effects such as nausea, bloating, or constipation.    She reports a lack of appetite, and 10 lb weight loss in the past 6 months but her energy levels remain good.    Her menstrual cycles have been regular but heavy, lasting six days with the heaviest flow on the second and third days. Her last period was last month.     She denies fever, night sweats, bone pain, focal weakness, palpable masses or enlarged nodes.       ECOG PS: 0      Review of Systems:   As in HPI    Vital Signs:  Height: 165.1 cm (5' 5\") (05/30 0940)  Weight: 74.8 kg (165 lb) (05/30  0940)  BSA (Calculated - sq m): 1.82 sq meters (05/30 0940)  Pulse: 58 (05/30 0940)  BP: 135/86 (05/30 0940)  Temp: 98.2 °F (36.8 °C) (05/30 0940)  Do Not Use - Resp Rate: --  SpO2: 98 % (05/30 0940)    Wt Readings from Last 6 Encounters:   05/30/25 74.8 kg (165 lb)   03/05/25 79.3 kg (174 lb 12.8 oz)   03/04/25 78.5 kg (173 lb)   11/21/24 78.5 kg (173 lb)   09/20/24 82.6 kg (182 lb)   08/08/24 80.7 kg (178 lb)      Physical Examination:  General: Awake, alert, oriented x3, no acute distress.    HEENT:  Anicteric, conjunctivae and sclerae clear, no sinus tenderness, no oropharyngeal lesion/thrush, mucous membranes are moist.  Neck:  Supple, no tenderness, no masses or adenopathy  Breast: L breast with central lumpectomy and post RT changes. Right breast mastopexy incisions healed well. No palpable masses in either breast. No axillary node bilaterally.   Lungs:  Clear to auscultation bilaterally  CV:  Regular rate and rhythm  Abdomen:  Non-distended, soft, nontender  Extremities:  No edema, no tenderness  Neuro:  Grossly normla.     Medications:    Current Outpatient Medications:     docusate sodium 100 MG Oral Cap, Take 1 capsule (100 mg total) by mouth 2 (two) times daily., Disp: 30 capsule, Rfl: 1    atorvastatin 40 MG Oral Tab, Take 1 tablet (40 mg total) by mouth in the morning., Disp: , Rfl:     Multiple Vitamin (MULTI-VITAMIN DAILY) Oral Tab, Take by mouth in the morning., Disp: , Rfl:     atenolol 50 MG Oral Tab, Take 1 tablet (50 mg total) by mouth in the morning., Disp: , Rfl:     Ferrous Sulfate (IRON) 325 (65 Fe) MG Oral Tab, Take by mouth in the morning., Disp: , Rfl:     HYDROcodone-acetaminophen 5-325 MG Oral Tab, Take 1-2 tablets by mouth every 4 (four) hours as needed. (Patient not taking: Reported on 5/30/2025), Disp: 20 tablet, Rfl: 0    ondansetron (ZOFRAN) 4 mg tablet, Take 1 tablet (4 mg total) by mouth every 8 (eight) hours as needed for Nausea. (Patient not taking: Reported on 5/30/2025),  Disp: 12 tablet, Rfl: 0    Naloxone HCl 4 MG/0.1ML Nasal Liquid, 4 mg by Nasal route as needed. If patient remains unresponsive, repeat dose in other nostril 2-5 minutes after first dose. (Patient not taking: Reported on 5/30/2025), Disp: 1 kit, Rfl: 0    HYDROcodone-acetaminophen 5-325 MG Oral Tab, Take 1-2 tablets by mouth every 6 (six) hours as needed. (Patient not taking: Reported on 5/30/2025), Disp: 40 tablet, Rfl: 0      Labs:  Lab Results   Component Value Date    WBC 3.0 (L) 05/30/2025    RBC 4.15 05/30/2025    HGB 12.4 05/30/2025    HCT 38.2 05/30/2025    MCV 92.0 05/30/2025    MCH 29.9 05/30/2025    MCHC 32.5 05/30/2025    RDW 11.4 05/30/2025    .0 05/30/2025     Lab Results   Component Value Date/Time    WBC 3.0 (L) 05/30/2025 09:10 AM    WBC 3.3 (L) 02/21/2025 10:17 AM    WBC 2.7 (L) 06/17/2024 09:27 AM    WBC 3.4 (L) 05/28/2024 09:14 AM    WBC 3.0 (L) 05/06/2024 10:34 AM    WBC 2.8 (L) 04/15/2024 09:43 AM    WBC 2.9 (L) 03/25/2024 08:59 AM    WBC 3.0 (L) 02/05/2024 09:46 AM    WBC 2.7 (L) 01/15/2024 08:57 AM    WBC 4.0 12/26/2023 09:20 AM     Lab Results   Component Value Date/Time    HGB 12.4 05/30/2025 09:10 AM    HGB 8.9 (L) 02/21/2025 10:17 AM    HGB 11.6 (L) 06/17/2024 09:27 AM    HGB 12.4 05/28/2024 09:14 AM    HGB 12.3 05/06/2024 10:34 AM    HGB 12.6 04/15/2024 09:43 AM    HGB 13.6 03/25/2024 08:59 AM    HGB 12.7 02/05/2024 09:46 AM    HGB 11.7 (L) 01/15/2024 08:57 AM    HGB 12.1 12/26/2023 09:20 AM     Lab Results   Component Value Date     05/30/2025    K 4.2 05/30/2025     05/30/2025    CO2 29.0 05/30/2025    BUN 8 (L) 05/30/2025    CREATSERUM 0.81 05/30/2025     (H) 05/30/2025    CA 9.6 05/30/2025    ALKPHO 61 05/30/2025    ALT 28 05/30/2025    AST 38 (H) 05/30/2025    BILT 0.4 05/30/2025    ALB 4.7 05/30/2025    TP 8.4 (H) 05/30/2025      Lab Results   Component Value Date/Time    JAVIER 25 (L) 02/21/2025 11:24 AM    JAVIER 60 11/21/2024 08:57 AM    JAVIER 190.8 11/13/2023  10:31 AM    JAVIER 42.0 07/03/2023 11:32 AM     Lab Results   Component Value Date/Time    SAT 19 02/21/2025 11:24 AM    SAT 41 11/13/2023 10:31 AM    SAT 25 07/03/2023 11:32 AM       Radiology:  No results found.       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 all margins negative for invasive carcinoma and DCIS, but had close DCIS superior margin <1 mm. re-excision was not recommended.   - s/p adjuvant TH followed by maintenance herceptin x1 yr completed 6/2024. adjuvant XRT completed 10/2023.   - bilateral diagnostic mammogram 11/2024 benign with stable post lumpectomy changes in the left breast.   - clinical ANABEL. continue surveillance; she is due for left diagnostic mammogram now. Advised to schedule.     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 Anemia resolved. Iron studies pending.  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   - no need for meds    Elevated total protein   - mildly elevated since 2024. Will check SPEP and sFLC       RTC 6 months       Brittany Treviño MD  Hematology Oncology

## 2025-06-02 LAB
ALBUMIN SERPL ELPH-MCNC: 3.92 G/DL (ref 3.75–5.21)
ALBUMIN/GLOB SERPL: 0.94 {RATIO} (ref 1–2)
ALPHA1 GLOB SERPL ELPH-MCNC: 0.26 G/DL (ref 0.19–0.46)
ALPHA2 GLOB SERPL ELPH-MCNC: 0.79 G/DL (ref 0.48–1.05)
B-GLOBULIN SERPL ELPH-MCNC: 0.88 G/DL (ref 0.68–1.23)
GAMMA GLOB SERPL ELPH-MCNC: 2.25 G/DL (ref 0.62–1.7)
KAPPA LC FREE SER-MCNC: 3.62 MG/DL (ref 0.33–1.94)
KAPPA LC FREE/LAMBDA FREE SER NEPH: 1.44 {RATIO} (ref 0.26–1.65)
LAMBDA LC FREE SERPL-MCNC: 2.51 MG/DL (ref 0.57–2.63)
PROT SERPL-MCNC: 8.1 G/DL (ref 5.7–8.2)

## 2025-06-16 ENCOUNTER — HOSPITAL ENCOUNTER (OUTPATIENT)
Dept: GENERAL RADIOLOGY | Facility: HOSPITAL | Age: 55
Discharge: HOME | End: 2025-06-16
Attending: NURSE PRACTITIONER
Payer: COMMERCIAL

## 2025-06-16 DIAGNOSIS — Z11.1 ENCOUNTER FOR SCREENING FOR RESPIRATORY TUBERCULOSIS: ICD-10-CM

## 2025-06-16 PROCEDURE — 71046 X-RAY EXAM CHEST 2 VIEWS: CPT | Performed by: NURSE PRACTITIONER

## 2025-06-19 ENCOUNTER — LAB ENCOUNTER (OUTPATIENT)
Dept: LAB | Age: 55
End: 2025-06-19
Attending: INTERNAL MEDICINE
Payer: COMMERCIAL

## 2025-06-19 DIAGNOSIS — C50.112 MALIGNANT NEOPLASM OF CENTRAL PORTION OF LEFT BREAST IN FEMALE, ESTROGEN RECEPTOR NEGATIVE (HCC): ICD-10-CM

## 2025-06-19 DIAGNOSIS — R76.11 POSITIVE TB TEST: Primary | ICD-10-CM

## 2025-06-19 DIAGNOSIS — Z17.1 MALIGNANT NEOPLASM OF CENTRAL PORTION OF LEFT BREAST IN FEMALE, ESTROGEN RECEPTOR NEGATIVE (HCC): ICD-10-CM

## 2025-06-19 DIAGNOSIS — D50.0 IRON DEFICIENCY ANEMIA DUE TO CHRONIC BLOOD LOSS: ICD-10-CM

## 2025-06-19 PROCEDURE — 86480 TB TEST CELL IMMUN MEASURE: CPT

## 2025-06-19 PROCEDURE — 36415 COLL VENOUS BLD VENIPUNCTURE: CPT

## 2025-06-24 LAB
M TB IFN-G CD4+ T-CELLS BLD-ACNC: 0.05 IU/ML
M TB TUBERC IFN-G BLD QL: POSITIVE
M TB TUBERC IGNF/MITOGEN IGNF CONTROL: >10 IU/ML
QFT TB1 AG MINUS NIL: 0.4 IU/ML
QFT TB2 AG MINUS NIL: 0.25 IU/ML

## 2025-07-02 ENCOUNTER — HOSPITAL ENCOUNTER (OUTPATIENT)
Dept: MAMMOGRAPHY | Facility: HOSPITAL | Age: 55
Discharge: HOME | End: 2025-07-02
Attending: INTERNAL MEDICINE
Payer: COMMERCIAL

## 2025-07-02 DIAGNOSIS — Z17.1 MALIGNANT NEOPLASM OF CENTRAL PORTION OF LEFT BREAST IN FEMALE, ESTROGEN RECEPTOR NEGATIVE (HCC): ICD-10-CM

## 2025-07-02 DIAGNOSIS — C50.112 MALIGNANT NEOPLASM OF CENTRAL PORTION OF LEFT BREAST IN FEMALE, ESTROGEN RECEPTOR NEGATIVE (HCC): ICD-10-CM

## 2025-07-02 PROCEDURE — 77061 BREAST TOMOSYNTHESIS UNI: CPT | Performed by: INTERNAL MEDICINE

## 2025-07-02 PROCEDURE — 77065 DX MAMMO INCL CAD UNI: CPT | Performed by: INTERNAL MEDICINE

## 2025-07-03 DIAGNOSIS — D05.12 DUCTAL CARCINOMA IN SITU (DCIS) OF LEFT BREAST: ICD-10-CM

## 2025-07-03 DIAGNOSIS — Z12.31 SCREENING MAMMOGRAM FOR BREAST CANCER: Primary | ICD-10-CM

## 2025-07-09 ENCOUNTER — PATIENT OUTREACH (OUTPATIENT)
Dept: INFECTIOUS DISEASE | Facility: CLINIC | Age: 55
End: 2025-07-09

## 2025-07-09 DIAGNOSIS — Z22.7 LATENT TUBERCULOSIS: Primary | ICD-10-CM

## 2025-07-09 NOTE — PROGRESS NOTES
Referral for latent TB  [place] from [facility] in 2022.  She does not recall having had TB skin or blood testing for emigration, but she did have a CXR that was read as negative.   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   A  QTF was also borderline positive with a TB2 minus nil of 0.40.   Given that she is an emigrant from [facility], has had a positive PPD, the borderline QTF likely is a true positive indicative of latent TB.  She recently completed adjuvent chemo for breast cancer.   The risk benefit evlaution favors treatment over no treatment.  She was adivsed.  Can start Rifampin x 4 months.  Follow LFTS.

## (undated) DEVICE — ADHESIVE SKIN TOP FOR WND CLSR DERMBND ADV

## (undated) DEVICE — UNDYED BRAIDED (POLYGLACTIN 910), SYNTHETIC ABSORBABLE SUTURE: Brand: COATED VICRYL

## (undated) DEVICE — SYRINGE 10ML LL TIP

## (undated) DEVICE — 40420 STANDARD LAMINECTOMY ARM CRADLE (1 PAIR): Brand: 40420 STANDARD LAMINECTOMY ARM CRADLE (1 PAIR)

## (undated) DEVICE — GAUZE,SPONGE,FLUFF,6"X6.75",STRL,5/TRAY: Brand: MEDLINE

## (undated) DEVICE — GLOVE SUR 6.5 SENSICARE PI PIP CRM PWD F

## (undated) DEVICE — SYRINGE MED 10ML LL TIP W/O SFTY DISP

## (undated) DEVICE — BREAST-HERNIA-PORT CDS-LF: Brand: MEDLINE INDUSTRIES, INC.

## (undated) DEVICE — SUT SILK 0 FSL 678G

## (undated) DEVICE — 3M™ IOBAN™ 2 ANTIMICROBIAL INCISE DRAPE 6648EZ: Brand: IOBAN™ 2

## (undated) DEVICE — PACK CDS PLASTIC BREAST

## (undated) DEVICE — SUT PDS II 4-0 18IN PS-2 ABSRB UD L19MM 3/8

## (undated) DEVICE — BRA SURGICAL ELIZABETH PINK L

## (undated) DEVICE — 3M™ STERI-STRIP™ REINFORCED ADHESIVE SKIN CLOSURES, R1548, 1 IN X 5 IN (25 MM X 125 MM), 4 STRIPS/ENVELOPE: Brand: 3M™ STERI-STRIP™

## (undated) DEVICE — GLOVE SUR 7.5 SENSICARE PI PIP CRM PWD F

## (undated) DEVICE — SUT MCRYL 4-0 18IN PS-2 ABSRB UD 19MM 3/8 CIR

## (undated) DEVICE — LIGHT HANDLE

## (undated) DEVICE — VIOLET BRAIDED (POLYGLACTIN 910), SYNTHETIC ABSORBABLE SUTURE: Brand: COATED VICRYL

## (undated) DEVICE — SLEEVE KENDALL SCD EXPRESS MED

## (undated) DEVICE — SLEEVE COMPR MD KNEE LEN SGL USE KENDALL SCD

## (undated) DEVICE — 3M™ STERI-STRIP™ REINFORCED ADHESIVE SKIN CLOSURES, R1547, 1/2 IN X 4 IN (12 MM X 100 MM), 6 STRIPS/ENVELOPE: Brand: 3M™ STERI-STRIP™

## (undated) DEVICE — ALCOHOL 70% 4 OZ

## (undated) DEVICE — DRAPE,TOWEL,LARGE,INVISISHIELD: Brand: MEDLINE

## (undated) DEVICE — SLIM BODY SKIN STAPLER: Brand: APPOSE ULC

## (undated) DEVICE — CURAD PAPER TAPE 2IN

## (undated) DEVICE — GLOVE SUR 8 SENSICARE PI PIP CRM PWD F

## (undated) DEVICE — SUT VICRYL 3-0 SH J416H

## (undated) DEVICE — MARKER SKIN PREP-RESISTANT ST: Brand: MEDLINE INDUSTRIES, INC.

## (undated) DEVICE — PENCIL TELESCOPE MEGADYNE SE

## (undated) DEVICE — STERILE POLYISOPRENE POWDER-FREE SURGICAL GLOVES: Brand: PROTEXIS

## (undated) DEVICE — LAPAROTOMY SPONGE - RF AND X-RAY DETECTABLE PRE-WASHED: Brand: SITUATE

## (undated) DEVICE — PROVE COVER: Brand: UNBRANDED

## (undated) DEVICE — ANTIBACTERIAL UNDYED BRAIDED (POLYGLACTIN 910), SYNTHETIC ABSORBABLE SUTURE: Brand: COATED VICRYL

## (undated) DEVICE — SOL NACL IRRIG 0.9% 1000ML BTL

## (undated) DEVICE — GOWN,SIRUS,FABRIC-REINFORCED,X-LARGE: Brand: MEDLINE

## (undated) DEVICE — SOLUTION IRRIG 1000ML 0.9% NACL USP BTL

## (undated) DEVICE — INSULATED BLADE ELECTRODE: Brand: EDGE

## (undated) DEVICE — Device

## (undated) NOTE — Clinical Note
Nice lady, doing okay, I encouraged counseling, but she will think about. Sees you in November, aldo mammogram before she sees you. thx

## (undated) NOTE — LETTER
Fabienne Rodriguez, :1970    CONSENT FOR PROCEDURE/SEDATION    1. I authorize the performance upon Fabienne Rodriguez  the following: Endometrial biopsy procedure    2. I authorize Dr. Heath Eddy MD (and whomever is designated as the doctor’s assistant), to perform the above-mentioned procedures.    3. If any unforeseen conditions arise during this procedure calling for additional  procedures, operations, or medications (including anesthesia and blood transfusion), I further request and authorize the doctor to do whatever he/she deems advisable in my interest.    4. I consent to the taking and reproduction of any photographs in the course of this procedure for professional purposes.    5. I consent to the administration of such sedation as may be considered necessary or advisable by the physician responsible for this service, with the exception of ______________________________________________________    6. I have been informed by my doctor of the nature and purpose of this procedure sedation, possible alternative methods of treatment, risk involved and possible complications.    7. If I have a Do Not Resuscitate (DNR) order in place, the physician and I (or the individual authorized to consent on my behalf) will discuss and agree as to whether the Do Not Resuscitate (DNR) order will remain in effect or will be discontinued during the performance of the procedure and the applicable recovery period. If the Do Not Resuscitate (DNR) order is discontinued and is to be reinstated following the procedure/recovery period, the physician will determine when the applicable recovery period ends for purposes of reinstating the Do Not Resuscitate (DNR) order.    Signature of Patient:_______________________________________________    Signature of person authorized to consent for patient:  _______________________________________________________________    Relationship to patient:  ____________________________________________    Witness: _________________________________________ Date:___________     Physician Signature: _______________________________ Date:___________

## (undated) NOTE — Clinical Note
I had the pleasure of seeing Lee Haley on 12/19/2023. Please see my attached note.   Delorise Homans, MD FACS [facility]--Surgery

## (undated) NOTE — Clinical Note
I had the pleasure of seeing Angel Cervantes on 5/16/2023. Please see my attached note.   Jinger Libman, MD FACS [facility]--Surgery

## (undated) NOTE — Clinical Note
Darius Soto, saw this pt due to palpitations.  EKG ok with occ PVCs.  She does admit to feeling anxious but doesn't know why-nothing out of control, she was quite calm.  I did tell her to schedule the Card TTE strain.  Copying Beverly.

## (undated) NOTE — LETTER
OUTSIDE TESTING RESULT REQUEST     IMPORTANT: FOR YOUR IMMEDIATE ATTENTION  Please FAX all test results listed below to: [phone]     Testing already done on or about: In your office today     * * * * If testing is NOT complete, arrange with patient A.S.A.P. * * * *      Patient Name: Fabienne Rodriguez  Surgery Date: 3/5/2025  Medical Record: [id number]  CSN: [id number]  : 1970 - A: 54 y     Sex: female  Surgeon(s):  Dominic Avery MD  Procedure: Right mastopexy, left breast scar revision, removal of right chest port  Anesthesia Type: General     Surgeon: Dominic Avery MD     The following Testing and Time Line are REQUIRED PER ANESTHESIA     EKG READ AND SIGNED WITHIN   90 days  BMP (requires 4 hour fast) within  90 days      Thank You,   Sent by:ROMEL Kwon  PreAdmission Testing

## (undated) NOTE — Clinical Note
I had the pleasure of seeing Hailee Young on 4/11/2023. Please see my attached note.   Jenny Valverde MD FACS [facility]--Surgery

## (undated) NOTE — LETTER
Patient Name: Fabienne Rodriguez-Age / Sex: 1970-A: 54 y  female   Medical Records: [id number] CSN: [id number]    ABNORMAL VALUES  Surgeon(s):  Dominic Avery MD  Anesthesia Type: General  Date of Surgery: 3/5/2025  Procedure Description: Right mastopexy, left breast scar revision, removal of right chest port  Primary Surgeon:  Dominic Avery MD  Phone Number: [phone]    PLEASE NOTE THE FOLLOWING ABNORMALITIES:   CBC from 25  showed low WBC of 2.6  CBC with Differential  Specimen: Blood  Component  Ref Range & Units 1 d ago   Auto WBC  3.9 - 11.0 10*3 /uL 2.6 Low    NRBCs Absolute Count  <=0.01 10*3 /uL 0   NRBC Relative percent  <=0 /100 WBC 0   RBC  3.85 - 5.12 10*6 /uL 3.83 Low    Hemoglobin  11.3 - 15.2 g/dL 11.7   Hematocrit  35.0 - 44.8 % 37.2   MCV  79.9 - 98.4 fL 97.1   MCH  26.9 - 32.2 pg 30.5   MCHC  30.9 - 35.4 g/dL 31.5   RDW  11.2 - 15.9 % 15.7   Platelets  131 - 340 10*3/uL 255   MPV  9.1 - 12.5 fL 11   Neutrophils Relative  % 45.6   Immature Granulocytes Relative  % 0.4   Lymphocytes Relative  % 39.5   Monocytes Relative  % 12.6   Eosinophils Relative  % 1.1   Basophils Relative  % 0.8   Neutrophils Absolute  1.6 - 6.4 10*3/uL 1.2 Low    Immature Granulocytes Absolute  0.0 - 0.1 10*3/uL 0   Lymphocytes Absolute  0.6 - 3.3 10*3/uL 1   Monocytes Absolute  0.2 - 1.2 10*3/uL 0.3   Eosinophils Absolute  0.0 - 0.4 10*3/uL 0   Basophils Absolute  0.0 - 0.1 10*3/uL 0   Resulting Agency [facility]   Specimen Collected: 25 11:06    Performed by: [facility] Last Resulted: 25 20:56   Received From: [facility]  Result Received: 25 09:10    ________________________________________________________

## (undated) NOTE — Clinical Note
Hello! I had the pleasure of seeing your patient, Fabienne Rodriguez, today in the office and have routed the progress note to you for your review.  Thank you for the referral and please contact me if you have any questions.  Sincerely,  Heath Eddy MD [facility]- Obstetrics [facility] Gyne[facility] Office: [phone]

## (undated) NOTE — Clinical Note
I had the pleasure of seeing Cordella Dakins on 4/25/2023. Please see my attached note.   Pramod Romero MD [facility]--Surgery